# Patient Record
Sex: FEMALE | Race: WHITE | Employment: FULL TIME | URBAN - NONMETROPOLITAN AREA
[De-identification: names, ages, dates, MRNs, and addresses within clinical notes are randomized per-mention and may not be internally consistent; named-entity substitution may affect disease eponyms.]

---

## 2017-11-03 ENCOUNTER — HOSPITAL ENCOUNTER (EMERGENCY)
Age: 42
Discharge: HOME | End: 2017-11-03
Payer: COMMERCIAL

## 2017-11-03 VITALS
SYSTOLIC BLOOD PRESSURE: 118 MMHG | OXYGEN SATURATION: 98 % | RESPIRATION RATE: 16 BRPM | TEMPERATURE: 98.5 F | DIASTOLIC BLOOD PRESSURE: 85 MMHG | HEART RATE: 88 BPM

## 2017-11-03 DIAGNOSIS — L02.91 ABSCESS: Primary | ICD-10-CM

## 2017-11-03 PROCEDURE — 10060 I&D ABSCESS SIMPLE/SINGLE: CPT

## 2017-11-03 PROCEDURE — 86403 PARTICLE AGGLUT ANTBDY SCRN: CPT

## 2017-11-03 PROCEDURE — 87070 CULTURE OTHR SPECIMN AEROBIC: CPT

## 2017-11-03 PROCEDURE — 6370000000 HC RX 637 (ALT 250 FOR IP): Performed by: PHYSICIAN ASSISTANT

## 2017-11-03 PROCEDURE — 87205 SMEAR GRAM STAIN: CPT

## 2017-11-03 PROCEDURE — 99283 EMERGENCY DEPT VISIT LOW MDM: CPT

## 2017-11-03 RX ORDER — CEPHALEXIN 500 MG/1
500 CAPSULE ORAL 4 TIMES DAILY
Qty: 40 CAPSULE | Refills: 0 | Status: SHIPPED | OUTPATIENT
Start: 2017-11-03 | End: 2017-11-13

## 2017-11-03 RX ORDER — SULFAMETHOXAZOLE AND TRIMETHOPRIM 800; 160 MG/1; MG/1
1 TABLET ORAL 2 TIMES DAILY
Qty: 20 TABLET | Refills: 0 | Status: SHIPPED | OUTPATIENT
Start: 2017-11-03 | End: 2017-11-13

## 2017-11-03 RX ORDER — SULFAMETHOXAZOLE AND TRIMETHOPRIM 800; 160 MG/1; MG/1
1 TABLET ORAL ONCE
Status: COMPLETED | OUTPATIENT
Start: 2017-11-03 | End: 2017-11-03

## 2017-11-03 RX ORDER — HYDROCODONE BITARTRATE AND ACETAMINOPHEN 5; 325 MG/1; MG/1
1 TABLET ORAL EVERY 8 HOURS PRN
Qty: 12 TABLET | Refills: 0 | Status: SHIPPED | OUTPATIENT
Start: 2017-11-03 | End: 2017-11-10

## 2017-11-03 RX ORDER — CEPHALEXIN 500 MG/1
500 CAPSULE ORAL ONCE
Status: COMPLETED | OUTPATIENT
Start: 2017-11-03 | End: 2017-11-03

## 2017-11-03 RX ORDER — LIDOCAINE HYDROCHLORIDE 10 MG/ML
5 INJECTION, SOLUTION INFILTRATION; PERINEURAL ONCE
Status: DISCONTINUED | OUTPATIENT
Start: 2017-11-03 | End: 2017-11-03 | Stop reason: HOSPADM

## 2017-11-03 RX ADMIN — SULFAMETHOXAZOLE AND TRIMETHOPRIM 1 TABLET: 800; 160 TABLET ORAL at 19:28

## 2017-11-03 RX ADMIN — CEPHALEXIN 500 MG: 500 CAPSULE ORAL at 19:28

## 2017-11-03 ASSESSMENT — PAIN DESCRIPTION - LOCATION: LOCATION: BUTTOCKS

## 2017-11-03 ASSESSMENT — PAIN SCALES - GENERAL: PAINLEVEL_OUTOF10: 5

## 2017-11-03 ASSESSMENT — PAIN DESCRIPTION - DESCRIPTORS: DESCRIPTORS: DISCOMFORT;ACHING;SHOOTING

## 2017-11-03 ASSESSMENT — PAIN DESCRIPTION - PAIN TYPE: TYPE: ACUTE PAIN

## 2017-11-03 ASSESSMENT — PAIN DESCRIPTION - ORIENTATION: ORIENTATION: RIGHT

## 2017-11-03 ASSESSMENT — PAIN DESCRIPTION - FREQUENCY: FREQUENCY: CONTINUOUS

## 2017-11-04 ASSESSMENT — ENCOUNTER SYMPTOMS
EYE DISCHARGE: 0
EYE REDNESS: 0
CHEST TIGHTNESS: 0
SORE THROAT: 0
SHORTNESS OF BREATH: 0
BLOOD IN STOOL: 0
NAUSEA: 0
RHINORRHEA: 0
ABDOMINAL PAIN: 0
BACK PAIN: 0
COUGH: 0
CONSTIPATION: 0
DIARRHEA: 0
VOMITING: 0
WHEEZING: 0

## 2017-11-05 LAB
CULTURE: ABNORMAL
DIRECT EXAM: ABNORMAL
DIRECT EXAM: ABNORMAL
Lab: ABNORMAL
Lab: ABNORMAL
SPECIMEN DESCRIPTION: ABNORMAL
SPECIMEN DESCRIPTION: ABNORMAL
STATUS: ABNORMAL

## 2017-11-05 NOTE — ED PROVIDER NOTES
place, and time. She appears well-developed and well-nourished. No distress. HENT:   Head: Normocephalic and atraumatic. Right Ear: External ear normal.   Left Ear: External ear normal.   Eyes: Conjunctivae are normal. Right eye exhibits no discharge. Left eye exhibits no discharge. No scleral icterus. Neck: Normal range of motion. Neck supple. No tracheal deviation present. Cardiovascular: Normal rate, regular rhythm and intact distal pulses. Pulmonary/Chest: Effort normal. No stridor. No respiratory distress. Musculoskeletal: Normal range of motion. She exhibits no edema, tenderness or deformity. Neurological: She is alert and oriented to person, place, and time. Skin: Skin is warm and dry. No rash noted. She is not diaphoretic. There is erythema. Patient has a 3 x 3 cm area of fluctuance and some mild surrounding induration to the left gluteus. There it is erythematous and warm. Tender to touch. It does not extend into the rectum. There is no streaking down the leg or across the buttocks. Psychiatric: She has a normal mood and affect. Her behavior is normal.   Nursing note and vitals reviewed.       DIAGNOSTIC RESULTS     EKG: All EKG's are interpreted by the Emergency Department Physician who either signs or Co-signs this chart in the absence of a cardiologist.      RADIOLOGY:   Non-plain film images such as CT, Ultrasound and MRI are read by the radiologist. Plain radiographic images are visualized and preliminarily interpreted by the emergency physician with the below findings:      Interpretation per the Radiologist below, if available at the time of this note:    No orders to display         ED BEDSIDE ULTRASOUND:   Performed by ED Physician - none    LABS:  Labs Reviewed   WOUND CULTURE - Abnormal; Notable for the following:        Result Value    Direct Exam RARE NEUTROPHILS (*)     Direct Exam  [address] (*)     All other components within normal limits       All other labs were within normal range or not returned as of this dictation. EMERGENCY DEPARTMENT COURSE and DIFFERENTIAL DIAGNOSIS/MDM:   Vitals:    Vitals:    11/03/17 1832 11/03/17 1935   BP: 129/77 118/85   Pulse: 100 88   Resp: 18 16   Temp: 98.5 °F (36.9 °C)    TempSrc: Tympanic    SpO2: 98%          MDM  53-year-old female who presents with abscess to left buttocks onset for the past week. There is an area where she obviously already has tried opening it up. I discussed with her that we can make a small opening as this doesn't extend directly into the rectum that is about 2 inches close to it. She is afebrile she not tachycardic she does not wish to have any imaging of this or blood work. Patient resting completely this time and in no distress after I&D. Stress with her diagnosis plantar treatment and follow-up. I discussed specifically with her the need to have this rechecked within 48 hours to the [place]. She verbalized agreement is plan questions significantly. She will otherwise return immediately to the ER with any new or worsening complaints. She mentioned she is to develop a fever or worsening redness to return immediately to the ER. Incision/Drainage  Date/Time: 11/4/2017 11:51 PM  Performed by: Neal Spurling by: Rogelio Barros     Consent:     Consent obtained:  Verbal    Consent given by:  Patient    Risks discussed:  Bleeding, damage to other organs, infection, incomplete drainage and pain    Alternatives discussed:  No treatment  Location:     Type:  Abscess    Location: Left gluteus. Pre-procedure details:     Skin preparation:  Betadine  Anesthesia (see MAR for exact dosages):      Anesthesia method:  Local infiltration    Local anesthetic:  Lidocaine 1% w/o epi  Procedure type:     Complexity:  Simple  Procedure details:     Incision types:  Stab incision    Scalpel blade:  11    Wound management:  Probed and deloculated, irrigated with saline and extensive cleaning    Drainage:  Bloody and purulent    Drainage amount: Moderate    Wound treatment:  Wound left open    Packing materials:  1/2 in gauze  Post-procedure details:     Patient tolerance of procedure: Tolerated well, no immediate complications        FINAL IMPRESSION      1.  Abscess          DISPOSITION/PLAN   DISPOSITION Decision to Discharge    PATIENT REFERRED TO:  Sr [address] [facility], DO  [address]  [phone]    Schedule an appointment as soon as possible for a visit in 2 days  For wound re-check    [facility] ED  [address] [facility]  [phone]    If symptoms worsen, As needed    [facility], DO  [address]   [phone]    Schedule an appointment as soon as possible for a visit   Call monday to schedule general surgery follow up Monday      DISCHARGE MEDICATIONS:  Discharge Medication List as of 11/3/2017  7:21 PM      START taking these medications    Details   sulfamethoxazole-trimethoprim (BACTRIM DS) 800-160 MG per tablet Take 1 tablet by mouth 2 times daily for 10 days, Disp-20 tablet, R-0Print      cephALEXin (KEFLEX) 500 MG capsule Take 1 capsule by mouth 4 times daily for 10 days, Disp-40 capsule, R-0Print      HYDROcodone-acetaminophen (NORCO) 5-325 MG per tablet Take 1 tablet by mouth every 8 hours as needed for Pain ., Disp-12 tablet, R-0Print                    Summation      Patient Course:      ED Medications administered this visit:    Medications   cephALEXin (KEFLEX) capsule 500 mg (500 mg Oral Given 11/3/17 1928)   sulfamethoxazole-trimethoprim (BACTRIM DS;SEPTRA DS) 800-160 MG per tablet 1 tablet (1 tablet Oral Given 11/3/17 1928)       New Prescriptions from this visit:    Discharge Medication List as of 11/3/2017  7:21 PM      START taking these medications    Details   sulfamethoxazole-trimethoprim (BACTRIM DS) 800-160 MG per tablet Take 1 tablet by mouth 2 times daily for 10 days, Disp-20 tablet, R-0Print      cephALEXin (KEFLEX) 500 MG capsule Take 1 capsule by mouth 4 times daily for 10 days, Disp-40 capsule, R-0Print      HYDROcodone-acetaminophen (NORCO) 5-325 MG per tablet Take 1 tablet by mouth every 8 hours as needed for Pain ., Disp-12 tablet, R-0Print             Follow-up:  Liborio Baeza DO  [address] [facility]  [phone]    Schedule an appointment as soon as possible for a visit in 2 days  For wound re-check    [facility] ED  [address] [facility]  [phone]    If symptoms worsen, As needed    James Farah DO  [address] [place] [address]   [phone]    Schedule an appointment as soon as possible for a visit   Call monday to schedule general surgery follow up Monday        Final Impression:   1.  Abscess               (Please note that portions of this note were completed with a voice recognition program.  Efforts were made to edit the dictations but occasionally words are mis-transcribed.)            Shaneka Boudreaux PA-C  11/04/17 2799

## 2018-02-05 RX ORDER — OMEPRAZOLE 20 MG/1
20 CAPSULE, DELAYED RELEASE ORAL DAILY
COMMUNITY
End: 2018-08-24 | Stop reason: ALTCHOICE

## 2018-02-15 ENCOUNTER — HOSPITAL ENCOUNTER (OUTPATIENT)
Age: 43
Discharge: HOME | End: 2018-02-15
Payer: COMMERCIAL

## 2018-02-15 ENCOUNTER — TELEPHONE (OUTPATIENT)
Dept: PRIMARY CARE CLINIC | Age: 43
End: 2018-02-15

## 2018-02-15 ENCOUNTER — OFFICE VISIT (OUTPATIENT)
Dept: PRIMARY CARE CLINIC | Age: 43
End: 2018-02-15
Payer: COMMERCIAL

## 2018-02-15 VITALS
DIASTOLIC BLOOD PRESSURE: 88 MMHG | HEIGHT: 63 IN | WEIGHT: 222.9 LBS | BODY MASS INDEX: 39.5 KG/M2 | RESPIRATION RATE: 20 BRPM | SYSTOLIC BLOOD PRESSURE: 115 MMHG | TEMPERATURE: 97.6 F | HEART RATE: 108 BPM

## 2018-02-15 DIAGNOSIS — Z90.5 SINGLE KIDNEY: ICD-10-CM

## 2018-02-15 DIAGNOSIS — Z23 NEED FOR 23-POLYVALENT PNEUMOCOCCAL POLYSACCHARIDE VACCINE: ICD-10-CM

## 2018-02-15 DIAGNOSIS — Z72.0 TOBACCO ABUSE: ICD-10-CM

## 2018-02-15 DIAGNOSIS — Z12.39 BREAST CANCER SCREENING: ICD-10-CM

## 2018-02-15 DIAGNOSIS — Z13.220 LIPID SCREENING: ICD-10-CM

## 2018-02-15 DIAGNOSIS — E11.9 TYPE 2 DIABETES MELLITUS WITHOUT COMPLICATION, WITHOUT LONG-TERM CURRENT USE OF INSULIN (HCC): ICD-10-CM

## 2018-02-15 DIAGNOSIS — Z76.89 ESTABLISHING CARE WITH NEW DOCTOR, ENCOUNTER FOR: Primary | ICD-10-CM

## 2018-02-15 DIAGNOSIS — Z76.89 ESTABLISHING CARE WITH NEW DOCTOR, ENCOUNTER FOR: ICD-10-CM

## 2018-02-15 LAB
-: ABNORMAL
ABSOLUTE EOS #: 0.3 K/UL (ref 0–0.4)
ABSOLUTE IMMATURE GRANULOCYTE: ABNORMAL K/UL (ref 0–0.3)
ABSOLUTE LYMPH #: 3.4 K/UL (ref 1–4.8)
ABSOLUTE MONO #: 0.5 K/UL (ref 0–1)
ALBUMIN SERPL-MCNC: 3.8 G/DL (ref 3.5–5.2)
ALBUMIN/GLOBULIN RATIO: 1.3 (ref 1–2.5)
ALP BLD-CCNC: 74 U/L (ref 35–104)
ALT SERPL-CCNC: 29 U/L (ref 5–33)
AMORPHOUS: ABNORMAL
ANION GAP SERPL CALCULATED.3IONS-SCNC: 15 MMOL/L (ref 9–17)
AST SERPL-CCNC: 19 U/L
BACTERIA: ABNORMAL
BASOPHILS # BLD: 0 % (ref 0–2)
BASOPHILS ABSOLUTE: 0 K/UL (ref 0–0.2)
BILIRUB SERPL-MCNC: 0.29 MG/DL (ref 0.3–1.2)
BILIRUBIN URINE: NEGATIVE
BUN BLDV-MCNC: 23 MG/DL (ref 6–20)
BUN/CREAT BLD: 35 (ref 9–20)
CALCIUM SERPL-MCNC: 9.2 MG/DL (ref 8.6–10.4)
CASTS UA: ABNORMAL /LPF
CHLORIDE BLD-SCNC: 96 MMOL/L (ref 98–107)
CO2: 21 MMOL/L (ref 20–31)
COLOR: YELLOW
COMMENT UA: ABNORMAL
CREAT SERPL-MCNC: 0.65 MG/DL (ref 0.5–0.9)
CREATININE URINE: 55.7 MG/DL (ref 28–217)
CRYSTALS, UA: ABNORMAL /HPF
DIFFERENTIAL TYPE: ABNORMAL
EOSINOPHILS RELATIVE PERCENT: 2 % (ref 0–8)
EPITHELIAL CELLS UA: ABNORMAL /HPF (ref 0–25)
GFR AFRICAN AMERICAN: >60 ML/MIN
GFR NON-AFRICAN AMERICAN: >60 ML/MIN
GFR SERPL CREATININE-BSD FRML MDRD: ABNORMAL ML/MIN/{1.73_M2}
GFR SERPL CREATININE-BSD FRML MDRD: ABNORMAL ML/MIN/{1.73_M2}
GLUCOSE BLD-MCNC: 359 MG/DL (ref 70–99)
GLUCOSE URINE: ABNORMAL
HBA1C MFR BLD: 11.3 %
HCT VFR BLD CALC: 42.8 % (ref 36–46)
HEMOGLOBIN: 14.4 G/DL (ref 12–16)
IMMATURE GRANULOCYTES: ABNORMAL %
KETONES, URINE: ABNORMAL
LEUKOCYTE ESTERASE, URINE: NEGATIVE
LYMPHOCYTES # BLD: 28 % (ref 24–44)
MCH RBC QN AUTO: 31.9 PG (ref 26–34)
MCHC RBC AUTO-ENTMCNC: 33.7 G/DL (ref 31–37)
MCV RBC AUTO: 94.6 FL (ref 80–100)
MICROALBUMIN/CREAT 24H UR: <12 MG/L
MICROALBUMIN/CREAT UR-RTO: NORMAL MCG/MG CREAT
MONOCYTES # BLD: 4 % (ref 0–12)
MUCUS: ABNORMAL
NITRITE, URINE: NEGATIVE
NRBC AUTOMATED: ABNORMAL PER 100 WBC
OTHER OBSERVATIONS UA: ABNORMAL
PDW BLD-RTO: 12.9 % (ref 12.1–15.2)
PH UA: 7 (ref 5–9)
PLATELET # BLD: 241 K/UL (ref 140–450)
PLATELET ESTIMATE: ABNORMAL
PMV BLD AUTO: 9.2 FL (ref 6–12)
POTASSIUM SERPL-SCNC: 4.2 MMOL/L (ref 3.7–5.3)
PROTEIN UA: NEGATIVE
RBC # BLD: 4.52 M/UL (ref 4–5.2)
RBC # BLD: ABNORMAL 10*6/UL
RBC UA: ABNORMAL /HPF (ref 0–2)
RENAL EPITHELIAL, UA: ABNORMAL /HPF
SEG NEUTROPHILS: 66 % (ref 36–66)
SEGMENTED NEUTROPHILS ABSOLUTE COUNT: 7.7 K/UL (ref 1.8–7.7)
SODIUM BLD-SCNC: 132 MMOL/L (ref 135–144)
SPECIFIC GRAVITY UA: 1.01 (ref 1.01–1.02)
TOTAL PROTEIN: 6.7 G/DL (ref 6.4–8.3)
TRICHOMONAS: ABNORMAL
TSH SERPL DL<=0.05 MIU/L-ACNC: 2.33 MIU/L (ref 0.3–5)
TURBIDITY: CLEAR
URINE HGB: NEGATIVE
UROBILINOGEN, URINE: NORMAL
WBC # BLD: 11.9 K/UL (ref 3.5–11)
WBC # BLD: ABNORMAL 10*3/UL
WBC UA: ABNORMAL /HPF (ref 0–5)
YEAST: ABNORMAL

## 2018-02-15 PROCEDURE — 85025 COMPLETE CBC W/AUTO DIFF WBC: CPT

## 2018-02-15 PROCEDURE — 84443 ASSAY THYROID STIM HORMONE: CPT

## 2018-02-15 PROCEDURE — 83036 HEMOGLOBIN GLYCOSYLATED A1C: CPT | Performed by: NURSE PRACTITIONER

## 2018-02-15 PROCEDURE — 82043 UR ALBUMIN QUANTITATIVE: CPT

## 2018-02-15 PROCEDURE — 36415 COLL VENOUS BLD VENIPUNCTURE: CPT

## 2018-02-15 PROCEDURE — 80053 COMPREHEN METABOLIC PANEL: CPT

## 2018-02-15 PROCEDURE — 99203 OFFICE O/P NEW LOW 30 MIN: CPT | Performed by: NURSE PRACTITIONER

## 2018-02-15 PROCEDURE — 82570 ASSAY OF URINE CREATININE: CPT

## 2018-02-15 PROCEDURE — 90471 IMMUNIZATION ADMIN: CPT | Performed by: NURSE PRACTITIONER

## 2018-02-15 PROCEDURE — 81001 URINALYSIS AUTO W/SCOPE: CPT

## 2018-02-15 PROCEDURE — 90732 PPSV23 VACC 2 YRS+ SUBQ/IM: CPT | Performed by: NURSE PRACTITIONER

## 2018-02-15 RX ORDER — ATORVASTATIN CALCIUM 40 MG/1
40 TABLET, FILM COATED ORAL DAILY
Qty: 30 TABLET | Refills: 3 | Status: SHIPPED | OUTPATIENT
Start: 2018-02-15 | End: 2018-05-17 | Stop reason: SDUPTHER

## 2018-02-15 RX ORDER — LISINOPRIL 2.5 MG/1
2.5 TABLET ORAL DAILY
Qty: 30 TABLET | Refills: 3 | Status: SHIPPED | OUTPATIENT
Start: 2018-02-15 | End: 2018-05-17 | Stop reason: SDUPTHER

## 2018-02-15 RX ORDER — ASPIRIN 81 MG/1
81 TABLET ORAL DAILY
Qty: 30 TABLET | Refills: 3 | Status: SHIPPED | OUTPATIENT
Start: 2018-02-15 | End: 2018-05-17 | Stop reason: SDUPTHER

## 2018-02-15 ASSESSMENT — ENCOUNTER SYMPTOMS
TROUBLE SWALLOWING: 0
VISUAL CHANGE: 0
EYES NEGATIVE: 1
GASTROINTESTINAL NEGATIVE: 1
BLURRED VISION: 0
RESPIRATORY NEGATIVE: 1

## 2018-02-15 NOTE — TELEPHONE ENCOUNTER
Called patient and informed of lab results and medication sent to pharmacy and the need to repeat labs in 3 months. Verbalizes understanding.

## 2018-02-15 NOTE — PROGRESS NOTES
After obtaining consent, and per orders of Colt Espinoza CNP, injection of Pneumovax 23 given IM in Right deltoid by Destiney Zavala. Patient instructed to remain in clinic for 20 minutes afterwards, and to report any adverse reaction to me immediately.

## 2018-02-15 NOTE — PROGRESS NOTES
Name: Johnathon Lopez  : 1975         Chief Complaint:     Chief Complaint   Patient presents with   [facility] Establish Care     previous pt of Dr Placido Felipe in [facility], has not been see in over 2 years.  Diabetes       History of Present Illness:      Johnathon Lopez is a 38 y.o.  female who presents with Establish Care (previous pt of Dr Placido Felipe in [facility], has not been see in over 2 years.) and Diabetes    Have you seen any other physician or provider since your last visit new to practice    Have you had any other diagnostic tests since your last visit? no    Have you changed or stopped any medications since your last visit including any over-the-counter medicines, vitamins, or herbal medicines? New to practice     Are you taking all your prescribed medications? Yes  If NO, why? -  N/A           Patient Self-Management Goal for this visit. What is your goal for your visit today? -Establish care    Barriers to success: none   Plan for overcoming my barriers: N/A      Confidence: 8/10   Date goal set: 2/15/18   Date expected to reach goal: 3months      Health Maintenance Due   Topic Date Due    Diabetic foot exam  1987    Diabetic retinal exam  1987    HIV screen  1992    Diabetic microalbuminuria test  1995    DTaP/Tdap/Td vaccine (1 - Tdap) 1996    Pneumococcal med risk (1 of 1 - PPSV23) 1996    Cervical cancer screen  1998    A1C test (Diabetic or Prediabetic)  2015    Lipid screen  2015    Flu vaccine (1) 2017     Has not seen a doctor for at least three years. States stopped taking all her diabetic medications. \"my blood sugar got better so I stopped taking them'      Diabetes   She presents for her follow-up diabetic visit. She has type 2 diabetes mellitus. No MedicAlert identification noted. The initial diagnosis of diabetes was made 5 years ago. Her disease course has been worsening. There are no hypoglycemic associated symptoms. A1C test (Diabetic or Prediabetic)  08/04/2015    Lipid screen  08/04/2015    Flu vaccine (1) 09/01/2017       Past Surgical History:     Past Surgical History:   Procedure Laterality Date    BLADDER SUSPENSION      CARPAL TUNNEL RELEASE      CHOLECYSTECTOMY      DILATION AND CURETTAGE OF UTERUS  2007    KIDNEY REMOVAL      left        Medications:       Prior to Admission medications    Medication Sig Start Date End Date Taking? Authorizing Provider   omeprazole (PRILOSEC) 20 MG delayed release capsule Take 20 mg by mouth Daily   Yes Historical Provider, MD   ibuprofen (ADVIL;MOTRIN) 200 MG tablet Take 800 mg by mouth daily as needed for Pain     Historical Provider, MD        Allergies:       Pcn [penicillins]    Social History:     Tobacco:    reports that she has been smoking Cigarettes. She has a 25.00 pack-year smoking history. She has never used smokeless tobacco.  Alcohol:      reports that she drinks about 3.6 oz of alcohol per week . Drug Use:  reports that she does not use drugs. Family History:     Family History   Problem Relation Age of Onset    High Cholesterol Mother     Other Brother     Alcohol Abuse Maternal Grandmother     Cancer Maternal Grandmother     Depression Maternal Grandmother     Diabetes Maternal Grandmother     Heart Disease Maternal Grandmother     High Blood Pressure Maternal Grandmother     High Cholesterol Maternal Grandmother     Mental Illness Maternal Grandmother     Obesity Maternal Grandmother     Other Maternal Grandfather        Review of Systems:     Positive and Negative as described in HPI    Review of Systems   Constitutional: Negative. Negative for activity change, appetite change, fever, unexpected weight change and weight loss. HENT: Negative. Negative for trouble swallowing. Eyes: Negative. Negative for blurred vision and visual disturbance. Respiratory: Negative. Cardiovascular: Negative.   Negative for chest pain, palpitations and leg swelling. Gastrointestinal: Negative. Endocrine: Positive for polydipsia, polyphagia and polyuria. Genitourinary: Positive for frequency. Negative for dysuria and menstrual problem. Musculoskeletal: Negative. Skin: Negative. Neurological: Negative. Psychiatric/Behavioral: Negative. Physical Exam:   Vitals: There were no vitals taken for this visit. Physical Exam   Constitutional: She is oriented to person, place, and time. Vital signs are normal. She appears well-developed and well-nourished. She is cooperative. Non-toxic appearance. She does not appear ill. No distress. Appears well hydrated and non toxic. Sitting upright on exam table without distress. Respirations are regular, non labored and quiet. HENT:   Head: Normocephalic and atraumatic. Nose: Nose normal.   Mouth/Throat: Uvula is midline, oropharynx is clear and moist and mucous membranes are normal.   Eyes: Conjunctivae and EOM are normal. Pupils are equal, round, and reactive to light. Neck: Normal range of motion. Neck supple. No tracheal deviation present. No thyromegaly present. Cardiovascular: Normal rate, regular rhythm, normal heart sounds and intact distal pulses. Exam reveals no gallop and no friction rub. No murmur heard. Pulses:       Radial pulses are 2+ on the left side. No peripheral edema    Pulmonary/Chest: Effort normal and breath sounds normal. No accessory muscle usage. No tachypnea. No respiratory distress. She has no decreased breath sounds. She has no wheezes. She has no rhonchi. She has no rales. No cough, no wheeze, no distress  Breath sounds are clear to auscultation over posterior bilateral fields. Chest expansion is symmetrical with non-restricted air movement. Abdominal: Soft. Normal appearance and bowel sounds are normal. She exhibits no distension, no abdominal bruit, no pulsatile midline mass and no mass. There is no hepatosplenomegaly.  There is no Differential     Standing Status:   Future     Standing Expiration Date:   2/15/2019    Urinalysis     Standing Status:   Future     Standing Expiration Date:   2/15/2019     Order Specific Question:   SPECIFY(EX-CATH,MIDSTREAM,CYSTO,ETC)? Answer:   [facility] Diabetes Education Noemi     Referral Priority:   Routine     Referral Type:   Consult for Advice and Opinion     Referral Reason:   Specialty Services Required     Number of Visits Requested:   1    POCT glycosylated hemoglobin (Hb A1C)     No orders of the defined types were placed in this encounter. No diagnosis found. 1.  Funmi Prajapati received counseling on the following healthy behaviors: nutrition, exercise, medication adherence and tobacco cessation  2. Patient given educational materials - see patient instructions  3. Was a self-tracking handout given in paper form or via [employer]? No  If yes, see orders or list here. 4.  Discussed use, benefit, and side effects of prescribed medications. Barriers to medication compliance addressed. All patient questions answered. Pt voiced understanding. 5.  Reviewed prior labs and health maintenance  6. Continue current medications, diet and exercise. Completed Refills   Requested Prescriptions      No prescriptions requested or ordered in this encounter     . ptn    No Follow-up on file.

## 2018-02-16 ENCOUNTER — HOSPITAL ENCOUNTER (OUTPATIENT)
Dept: DIABETES SERVICES | Age: 43
Setting detail: THERAPIES SERIES
Discharge: HOME | End: 2018-02-16
Payer: COMMERCIAL

## 2018-02-16 PROCEDURE — G0108 DIAB MANAGE TRN  PER INDIV: HCPCS | Performed by: COUNSELOR

## 2018-02-16 ASSESSMENT — PATIENT HEALTH QUESTIONNAIRE - PHQ9
10. IF YOU CHECKED OFF ANY PROBLEMS, HOW DIFFICULT HAVE THESE PROBLEMS MADE IT FOR YOU TO DO YOUR WORK, TAKE CARE OF THINGS AT HOME, OR GET ALONG WITH OTHER PEOPLE: 1
SUM OF ALL RESPONSES TO PHQ9 QUESTIONS 1 & 2: 6
9. THOUGHTS THAT YOU WOULD BE BETTER OFF DEAD, OR OF HURTING YOURSELF: 0
5. POOR APPETITE OR OVEREATING: 2
8. MOVING OR SPEAKING SO SLOWLY THAT OTHER PEOPLE COULD HAVE NOTICED. OR THE OPPOSITE, BEING SO FIGETY OR RESTLESS THAT YOU HAVE BEEN MOVING AROUND A LOT MORE THAN USUAL: 0
4. FEELING TIRED OR HAVING LITTLE ENERGY: 3
6. FEELING BAD ABOUT YOURSELF - OR THAT YOU ARE A FAILURE OR HAVE LET YOURSELF OR YOUR FAMILY DOWN: 1
3. TROUBLE FALLING OR STAYING ASLEEP: 2
2. FEELING DOWN, DEPRESSED OR HOPELESS: 3
SUM OF ALL RESPONSES TO PHQ QUESTIONS 1-9: 14
1. LITTLE INTEREST OR PLEASURE IN DOING THINGS: 3
7. TROUBLE CONCENTRATING ON THINGS, SUCH AS READING THE NEWSPAPER OR WATCHING TELEVISION: 0

## 2018-02-16 NOTE — PROGRESS NOTES
Diabetes Self-Management Education Record    Progress Note: Patient reports feeling depressed for several months, because of her health. Patient reports that she is often down during winter months. Encouraged exercise . Reviewed carb counting and importance of meal planning.       Participant Name: Kieran Lakhani  Referring Provider:  Radhika Aguirre NP    Keys to learning:  Considerations: []Language []Emotional []Health Literacy  []Cognitive []Memory changes []Financial []Cultural   []Samaritan []Vision []Hearing  []Speech []Lack of desire  []Literacy  []Psycho-social  []None  If considerations are noted, accommodations made:     Identified barriers to learning/self management:     The following information was discussed:    [x] Diabetes disease process and treatment options   [x] Healthy nutrition, carbohydrate counting, meal planning  [x] Monitoring blood glucose and other parameters; interpreting and using results  [] Acute complications--prevention, detection and treatment  [] Medication management and safety Instructed by: [] Pharmacist [] nurse  [] Incorporating physical activity into lifestyle Instructed by:[] Exercise physiologist [] nurse  [] Exercise for Health, Reducing Risks for Heart Disease, Diabetes and Heart Health  [] Preventing, through risk reduction behaviors, detecting, and treating chronic complications  [] Sick Day management  [] Developing personalized strategies to address psychosocial issues and concerns  [] Developing personalized strategies to promote health and behavior change through goalsetting, behavior change strategies aimed at risk reduction  [] Special situations--disaster planning, travel, social activities    Session Assessment & Evaluation Ratings:  1=Needs Instruction  2=Needs Review  3=Comprehends Key Points  4=Demonstrates Understanding/Competency  NC=Not Covered   N/A=Not Applicable Initial  Assess    Date:  2-16-18 2nd  Visit     Date:   3rd  Visit    Date: Correctly read food labels & demonstrate CHO counting & portion control with personalized meal plan. Rating  [] 1  [] 2  [x] 3  [] 4  [] NC  [] N/A     Rating  [] 1  [] 2  [] 3  [] 4  [] NC  [] N/A     Rating  [] 1  [] 2  [] 3  [] 4  [] NC  [] N/A     Rating  [] 1  [] 2  [] 3  [] 4  [] NC  [] N/A                 Incorporating physical activity into lifestyle:   State effect of exercise on blood glucose levels. Identifies personal exercise plan. Discussed safety tips while exercising. Rating  [] 1  [] 2  [x] 3  [] 4  [] NC  [] N/A     Rating  [] 1  [] 2  [] 3  [] 4  [] NC  [] N/A       Rating  [] 1  [] 2  [] 3  [] 4  [] NC  [] N/A     Rating  [] 1  [] 2  [] 3  [] 4  [] NC  [] N/A           Using medications safely: State effect of diabetes medicines on diabetes;   Name diabetes medication taking, action, timing & side effects     Rating  [] 1  [] 2  [x] 3  [] 4  [] NC  [] N/A       Rating  [] 1  [] 2  [] 3  [] 4  [] NC  [] N/A       Rating  [] 1  [] 2  [] 3  [] 4  [] NC  [] N/A       Rating  [] 1  [] 2  [] 3  [] 4  [] NC  [] N/A           Monitoring blood glucose, interpreting and using results: Identify recommended blood glucose targets, personal targets, appropriate techniques and problem solving. Rating  [] 1  [] 2  [x] 3  [] 4  [] NC  [] N/A     Rating  [] 1  [] 2  [] 3  [] 4  [] NC  [] N/A       Rating  [] 1  [] 2  [] 3  [] 4  [] NC  [] N/A     Rating  [] 1  [] 2  [] 3  [] 4  [] NC  [] N/A         Prevention, detection & treatment of acute complications: List symptoms of hyper- and hypoglycemia;    Describe how to treat low blood sugar & actions for lowering high blood glucose levels  Rating  [] 1  [] 2  [x] 3  [] 4  [] NC  [] N/A     Rating  [] 1  [] 2  [] 3  [] 4  [] NC  [] N/A     Rating  [] 1  [] 2  [] 3  [] 4  [] NC  [] N/A     Rating  [] 1  [] 2  [] 3  [] 4  [] NC  [] N/A         Prevention, detection & treatment of chronic complications: Define the natural course of diabetes & describe

## 2018-02-17 ENCOUNTER — HOSPITAL ENCOUNTER (OUTPATIENT)
Age: 43
Discharge: HOME | End: 2018-02-17
Payer: COMMERCIAL

## 2018-02-17 DIAGNOSIS — Z13.220 LIPID SCREENING: ICD-10-CM

## 2018-02-17 LAB
CHOLESTEROL/HDL RATIO: 9.5
CHOLESTEROL: 208 MG/DL
HDLC SERPL-MCNC: 22 MG/DL
LDL CHOLESTEROL DIRECT: 75 MG/DL
LDL CHOLESTEROL: ABNORMAL MG/DL (ref 0–130)
TRIGL SERPL-MCNC: 811 MG/DL
VLDLC SERPL CALC-MCNC: ABNORMAL MG/DL (ref 1–30)

## 2018-02-17 PROCEDURE — 36415 COLL VENOUS BLD VENIPUNCTURE: CPT

## 2018-02-17 PROCEDURE — 83721 ASSAY OF BLOOD LIPOPROTEIN: CPT

## 2018-02-17 PROCEDURE — 80061 LIPID PANEL: CPT

## 2018-02-19 ENCOUNTER — TELEPHONE (OUTPATIENT)
Dept: PRIMARY CARE CLINIC | Age: 43
End: 2018-02-19

## 2018-02-19 DIAGNOSIS — E11.9 TYPE 2 DIABETES MELLITUS WITHOUT COMPLICATION, WITHOUT LONG-TERM CURRENT USE OF INSULIN (HCC): Primary | ICD-10-CM

## 2018-02-19 DIAGNOSIS — E78.5 HYPERLIPIDEMIA, UNSPECIFIED HYPERLIPIDEMIA TYPE: ICD-10-CM

## 2018-03-08 ENCOUNTER — HOSPITAL ENCOUNTER (OUTPATIENT)
Dept: WOMENS IMAGING | Age: 43
Discharge: HOME | End: 2018-03-10
Payer: COMMERCIAL

## 2018-03-08 DIAGNOSIS — Z12.39 BREAST CANCER SCREENING: ICD-10-CM

## 2018-03-08 PROCEDURE — 77067 SCR MAMMO BI INCL CAD: CPT

## 2018-03-09 ENCOUNTER — TELEPHONE (OUTPATIENT)
Dept: PRIMARY CARE CLINIC | Age: 43
End: 2018-03-09

## 2018-03-09 NOTE — TELEPHONE ENCOUNTER
----- Message from Davonte Smith NP sent at 3/8/2018  5:55 PM EST -----  No mammographic evidence of malignancy. (CATEGORY 1 - ACR BI-RADS: NEGATIVE)   Please relate to patient/parent. Thank you.    Neno Alicea

## 2018-05-14 ENCOUNTER — HOSPITAL ENCOUNTER (OUTPATIENT)
Dept: GENERAL RADIOLOGY | Age: 43
Discharge: HOME | End: 2018-05-16
Payer: COMMERCIAL

## 2018-05-14 ENCOUNTER — HOSPITAL ENCOUNTER (OUTPATIENT)
Age: 43
Discharge: HOME | End: 2018-05-14
Payer: COMMERCIAL

## 2018-05-14 ENCOUNTER — OFFICE VISIT (OUTPATIENT)
Dept: PRIMARY CARE CLINIC | Age: 43
End: 2018-05-14
Payer: COMMERCIAL

## 2018-05-14 ENCOUNTER — HOSPITAL ENCOUNTER (OUTPATIENT)
Age: 43
Discharge: HOME | End: 2018-05-16
Payer: COMMERCIAL

## 2018-05-14 ENCOUNTER — TELEPHONE (OUTPATIENT)
Dept: PRIMARY CARE CLINIC | Age: 43
End: 2018-05-14

## 2018-05-14 VITALS
TEMPERATURE: 98.2 F | WEIGHT: 216.9 LBS | RESPIRATION RATE: 20 BRPM | DIASTOLIC BLOOD PRESSURE: 82 MMHG | BODY MASS INDEX: 38.42 KG/M2 | HEART RATE: 101 BPM | SYSTOLIC BLOOD PRESSURE: 128 MMHG

## 2018-05-14 DIAGNOSIS — M25.511 RIGHT SHOULDER PAIN, UNSPECIFIED CHRONICITY: ICD-10-CM

## 2018-05-14 DIAGNOSIS — R55 SYNCOPE, UNSPECIFIED SYNCOPE TYPE: ICD-10-CM

## 2018-05-14 DIAGNOSIS — M89.8X1 PAIN OF RIGHT CLAVICLE: ICD-10-CM

## 2018-05-14 DIAGNOSIS — B37.31 VAGINA, CANDIDIASIS: ICD-10-CM

## 2018-05-14 DIAGNOSIS — E78.5 HYPERLIPIDEMIA, UNSPECIFIED HYPERLIPIDEMIA TYPE: ICD-10-CM

## 2018-05-14 DIAGNOSIS — Z72.0 TOBACCO ABUSE: ICD-10-CM

## 2018-05-14 DIAGNOSIS — E11.9 TYPE 2 DIABETES MELLITUS WITHOUT COMPLICATION, WITHOUT LONG-TERM CURRENT USE OF INSULIN (HCC): Primary | ICD-10-CM

## 2018-05-14 DIAGNOSIS — R94.31 EKG, ABNORMAL: Primary | ICD-10-CM

## 2018-05-14 LAB
EKG ATRIAL RATE: 94 BPM
EKG P AXIS: 53 DEGREES
EKG P-R INTERVAL: 144 MS
EKG Q-T INTERVAL: 360 MS
EKG QRS DURATION: 86 MS
EKG QTC CALCULATION (BAZETT): 450 MS
EKG R AXIS: 48 DEGREES
EKG T AXIS: 28 DEGREES
EKG VENTRICULAR RATE: 94 BPM
HBA1C MFR BLD: 10.1 %

## 2018-05-14 PROCEDURE — 73030 X-RAY EXAM OF SHOULDER: CPT

## 2018-05-14 PROCEDURE — 93005 ELECTROCARDIOGRAM TRACING: CPT

## 2018-05-14 PROCEDURE — 83036 HEMOGLOBIN GLYCOSYLATED A1C: CPT | Performed by: NURSE PRACTITIONER

## 2018-05-14 PROCEDURE — 99213 OFFICE O/P EST LOW 20 MIN: CPT | Performed by: NURSE PRACTITIONER

## 2018-05-14 PROCEDURE — 73000 X-RAY EXAM OF COLLAR BONE: CPT

## 2018-05-14 RX ORDER — FLUCONAZOLE 150 MG/1
150 TABLET ORAL ONCE
Qty: 1 TABLET | Refills: 0 | Status: SHIPPED | OUTPATIENT
Start: 2018-05-14 | End: 2018-05-14

## 2018-05-14 ASSESSMENT — ENCOUNTER SYMPTOMS
GASTROINTESTINAL NEGATIVE: 1
SHORTNESS OF BREATH: 0
RESPIRATORY NEGATIVE: 1
COLOR CHANGE: 0
TROUBLE SWALLOWING: 0
WHEEZING: 0
EYES NEGATIVE: 1

## 2018-05-15 ENCOUNTER — TELEPHONE (OUTPATIENT)
Dept: PRIMARY CARE CLINIC | Age: 43
End: 2018-05-15

## 2018-05-15 DIAGNOSIS — M25.511 ACUTE PAIN OF RIGHT SHOULDER: Primary | ICD-10-CM

## 2018-05-16 ENCOUNTER — TELEPHONE (OUTPATIENT)
Dept: PRIMARY CARE CLINIC | Age: 43
End: 2018-05-16

## 2018-05-16 ENCOUNTER — HOSPITAL ENCOUNTER (OUTPATIENT)
Age: 43
Discharge: HOME | End: 2018-05-16
Payer: COMMERCIAL

## 2018-05-16 DIAGNOSIS — R82.90 ABNORMAL URINE: Primary | ICD-10-CM

## 2018-05-16 DIAGNOSIS — E78.5 HYPERLIPIDEMIA, UNSPECIFIED HYPERLIPIDEMIA TYPE: ICD-10-CM

## 2018-05-16 DIAGNOSIS — E11.9 TYPE 2 DIABETES MELLITUS WITHOUT COMPLICATION, WITHOUT LONG-TERM CURRENT USE OF INSULIN (HCC): ICD-10-CM

## 2018-05-16 LAB
-: ABNORMAL
ABSOLUTE EOS #: 0.26 K/UL (ref 0–0.44)
ABSOLUTE IMMATURE GRANULOCYTE: 0.07 K/UL (ref 0–0.3)
ABSOLUTE LYMPH #: 3.74 K/UL (ref 1.1–3.7)
ABSOLUTE MONO #: 0.55 K/UL (ref 0.1–1.2)
ALBUMIN SERPL-MCNC: 4 G/DL (ref 3.5–5.2)
ALBUMIN/GLOBULIN RATIO: 1.3 (ref 1–2.5)
ALP BLD-CCNC: 80 U/L (ref 35–104)
ALT SERPL-CCNC: 22 U/L (ref 5–33)
AMORPHOUS: ABNORMAL
ANION GAP SERPL CALCULATED.3IONS-SCNC: 16 MMOL/L (ref 9–17)
AST SERPL-CCNC: 14 U/L
BACTERIA: ABNORMAL
BASOPHILS # BLD: 0 % (ref 0–2)
BASOPHILS ABSOLUTE: 0.05 K/UL (ref 0–0.2)
BILIRUB SERPL-MCNC: 0.33 MG/DL (ref 0.3–1.2)
BILIRUBIN URINE: NEGATIVE
BUN BLDV-MCNC: 12 MG/DL (ref 6–20)
BUN/CREAT BLD: 24 (ref 9–20)
CALCIUM SERPL-MCNC: 9.8 MG/DL (ref 8.6–10.4)
CASTS UA: ABNORMAL /LPF
CHLORIDE BLD-SCNC: 97 MMOL/L (ref 98–107)
CHOLESTEROL/HDL RATIO: 7.2
CHOLESTEROL: 165 MG/DL
CO2: 22 MMOL/L (ref 20–31)
COLOR: YELLOW
COMMENT UA: ABNORMAL
CREAT SERPL-MCNC: 0.49 MG/DL (ref 0.5–0.9)
CRYSTALS, UA: ABNORMAL /HPF
DIFFERENTIAL TYPE: ABNORMAL
EOSINOPHILS RELATIVE PERCENT: 2 % (ref 1–4)
EPITHELIAL CELLS UA: ABNORMAL /HPF (ref 0–25)
ESTIMATED AVERAGE GLUCOSE: 243 MG/DL
GFR AFRICAN AMERICAN: >60 ML/MIN
GFR NON-AFRICAN AMERICAN: >60 ML/MIN
GFR SERPL CREATININE-BSD FRML MDRD: ABNORMAL ML/MIN/{1.73_M2}
GFR SERPL CREATININE-BSD FRML MDRD: ABNORMAL ML/MIN/{1.73_M2}
GLUCOSE BLD-MCNC: 236 MG/DL (ref 70–99)
GLUCOSE URINE: ABNORMAL
HBA1C MFR BLD: 10.1 % (ref 4.8–5.9)
HCT VFR BLD CALC: 45.3 % (ref 36.3–47.1)
HDLC SERPL-MCNC: 23 MG/DL
HEMOGLOBIN: 15.3 G/DL (ref 11.9–15.1)
IMMATURE GRANULOCYTES: 1 %
KETONES, URINE: ABNORMAL
LDL CHOLESTEROL DIRECT: 81 MG/DL
LDL CHOLESTEROL: ABNORMAL MG/DL (ref 0–130)
LEUKOCYTE ESTERASE, URINE: NEGATIVE
LYMPHOCYTES # BLD: 30 % (ref 24–43)
MCH RBC QN AUTO: 31.9 PG (ref 25.2–33.5)
MCHC RBC AUTO-ENTMCNC: 33.8 G/DL (ref 28.4–34.8)
MCV RBC AUTO: 94.6 FL (ref 82.6–102.9)
MONOCYTES # BLD: 5 % (ref 3–12)
MUCUS: ABNORMAL
NITRITE, URINE: NEGATIVE
NRBC AUTOMATED: 0 PER 100 WBC
OTHER OBSERVATIONS UA: ABNORMAL
PDW BLD-RTO: 12.3 % (ref 11.8–14.4)
PH UA: 6 (ref 5–9)
PLATELET # BLD: 265 K/UL (ref 138–453)
PLATELET ESTIMATE: ABNORMAL
PMV BLD AUTO: 10.3 FL (ref 8.1–13.5)
POTASSIUM SERPL-SCNC: 4.3 MMOL/L (ref 3.7–5.3)
PROTEIN UA: NEGATIVE
RBC # BLD: 4.79 M/UL (ref 3.95–5.11)
RBC # BLD: ABNORMAL 10*6/UL
RBC UA: ABNORMAL /HPF (ref 0–2)
RENAL EPITHELIAL, UA: ABNORMAL /HPF
SEG NEUTROPHILS: 62 % (ref 36–65)
SEGMENTED NEUTROPHILS ABSOLUTE COUNT: 7.65 K/UL (ref 1.5–8.1)
SODIUM BLD-SCNC: 135 MMOL/L (ref 135–144)
SPECIFIC GRAVITY UA: 1.02 (ref 1.01–1.02)
TOTAL PROTEIN: 7 G/DL (ref 6.4–8.3)
TRICHOMONAS: ABNORMAL
TRIGL SERPL-MCNC: 568 MG/DL
TURBIDITY: ABNORMAL
URINE HGB: NEGATIVE
UROBILINOGEN, URINE: NORMAL
VLDLC SERPL CALC-MCNC: ABNORMAL MG/DL (ref 1–30)
WBC # BLD: 12.3 K/UL (ref 3.5–11.3)
WBC # BLD: ABNORMAL 10*3/UL
WBC UA: ABNORMAL /HPF (ref 0–5)
YEAST: ABNORMAL

## 2018-05-16 PROCEDURE — 80061 LIPID PANEL: CPT

## 2018-05-16 PROCEDURE — 36415 COLL VENOUS BLD VENIPUNCTURE: CPT

## 2018-05-16 PROCEDURE — 83036 HEMOGLOBIN GLYCOSYLATED A1C: CPT

## 2018-05-16 PROCEDURE — 83721 ASSAY OF BLOOD LIPOPROTEIN: CPT

## 2018-05-16 PROCEDURE — 80053 COMPREHEN METABOLIC PANEL: CPT

## 2018-05-16 PROCEDURE — 85025 COMPLETE CBC W/AUTO DIFF WBC: CPT

## 2018-05-16 PROCEDURE — 81001 URINALYSIS AUTO W/SCOPE: CPT

## 2018-05-17 ENCOUNTER — OFFICE VISIT (OUTPATIENT)
Dept: PRIMARY CARE CLINIC | Age: 43
End: 2018-05-17
Payer: COMMERCIAL

## 2018-05-17 ENCOUNTER — TELEPHONE (OUTPATIENT)
Dept: PRIMARY CARE CLINIC | Age: 43
End: 2018-05-17

## 2018-05-17 ENCOUNTER — HOSPITAL ENCOUNTER (OUTPATIENT)
Age: 43
Discharge: HOME | End: 2018-05-17
Payer: COMMERCIAL

## 2018-05-17 VITALS
TEMPERATURE: 97.9 F | HEART RATE: 85 BPM | RESPIRATION RATE: 20 BRPM | DIASTOLIC BLOOD PRESSURE: 88 MMHG | BODY MASS INDEX: 38.6 KG/M2 | WEIGHT: 217.9 LBS | SYSTOLIC BLOOD PRESSURE: 115 MMHG

## 2018-05-17 DIAGNOSIS — R82.90 ABNORMAL URINE: ICD-10-CM

## 2018-05-17 DIAGNOSIS — Z72.0 TOBACCO ABUSE: ICD-10-CM

## 2018-05-17 DIAGNOSIS — Z90.5 SINGLE KIDNEY: ICD-10-CM

## 2018-05-17 DIAGNOSIS — M25.511 ACUTE PAIN OF RIGHT SHOULDER: ICD-10-CM

## 2018-05-17 DIAGNOSIS — E78.5 HYPERLIPIDEMIA, UNSPECIFIED HYPERLIPIDEMIA TYPE: ICD-10-CM

## 2018-05-17 DIAGNOSIS — M89.8X1 PAIN OF RIGHT CLAVICLE: ICD-10-CM

## 2018-05-17 DIAGNOSIS — E11.9 TYPE 2 DIABETES MELLITUS WITHOUT COMPLICATION, WITHOUT LONG-TERM CURRENT USE OF INSULIN (HCC): Primary | ICD-10-CM

## 2018-05-17 PROBLEM — E28.2 PCOS (POLYCYSTIC OVARIAN SYNDROME): Status: ACTIVE | Noted: 2018-05-17

## 2018-05-17 LAB
-: NORMAL
AMORPHOUS: NORMAL
BACTERIA: NORMAL
BILIRUBIN URINE: NEGATIVE
CASTS UA: NORMAL /LPF
COLOR: YELLOW
COMMENT UA: ABNORMAL
CRYSTALS, UA: NORMAL /HPF
EPITHELIAL CELLS UA: NORMAL /HPF (ref 0–25)
GLUCOSE URINE: ABNORMAL
KETONES, URINE: NEGATIVE
LEUKOCYTE ESTERASE, URINE: NEGATIVE
MUCUS: NORMAL
NITRITE, URINE: NEGATIVE
OTHER OBSERVATIONS UA: NORMAL
PH UA: 7 (ref 5–9)
PROTEIN UA: NEGATIVE
RBC UA: NORMAL /HPF (ref 0–2)
RENAL EPITHELIAL, UA: NORMAL /HPF
SPECIFIC GRAVITY UA: 1.01 (ref 1.01–1.02)
TRICHOMONAS: NORMAL
TURBIDITY: CLEAR
URINE HGB: NEGATIVE
UROBILINOGEN, URINE: NORMAL
WBC UA: NORMAL /HPF (ref 0–5)
YEAST: NORMAL

## 2018-05-17 PROCEDURE — 99213 OFFICE O/P EST LOW 20 MIN: CPT | Performed by: NURSE PRACTITIONER

## 2018-05-17 PROCEDURE — 81001 URINALYSIS AUTO W/SCOPE: CPT

## 2018-05-17 RX ORDER — ASPIRIN 81 MG/1
81 TABLET ORAL DAILY
Qty: 30 TABLET | Refills: 3 | Status: SHIPPED | OUTPATIENT
Start: 2018-05-17 | End: 2018-08-22 | Stop reason: SDUPTHER

## 2018-05-17 RX ORDER — ATORVASTATIN CALCIUM 40 MG/1
40 TABLET, FILM COATED ORAL DAILY
Qty: 30 TABLET | Refills: 3 | Status: SHIPPED | OUTPATIENT
Start: 2018-05-17 | End: 2018-08-22 | Stop reason: SDUPTHER

## 2018-05-17 RX ORDER — LISINOPRIL 2.5 MG/1
2.5 TABLET ORAL DAILY
Qty: 30 TABLET | Refills: 3 | Status: SHIPPED | OUTPATIENT
Start: 2018-05-17 | End: 2018-08-22 | Stop reason: SDUPTHER

## 2018-05-17 ASSESSMENT — ENCOUNTER SYMPTOMS
TROUBLE SWALLOWING: 0
EYES NEGATIVE: 1
RESPIRATORY NEGATIVE: 1
BLURRED VISION: 0
GASTROINTESTINAL NEGATIVE: 1

## 2018-05-24 ENCOUNTER — TELEPHONE (OUTPATIENT)
Dept: PRIMARY CARE CLINIC | Age: 43
End: 2018-05-24

## 2018-05-24 ENCOUNTER — HOSPITAL ENCOUNTER (OUTPATIENT)
Dept: NON INVASIVE DIAGNOSTICS | Age: 43
Discharge: HOME | End: 2018-05-24
Payer: COMMERCIAL

## 2018-05-24 PROCEDURE — 93660 TILT TABLE EVALUATION: CPT

## 2018-05-24 PROCEDURE — 6370000000 HC RX 637 (ALT 250 FOR IP): Performed by: FAMILY MEDICINE

## 2018-05-24 RX ORDER — NITROGLYCERIN 0.3 MG/1
0.3 TABLET SUBLINGUAL EVERY 5 MIN PRN
Status: DISCONTINUED | OUTPATIENT
Start: 2018-05-24 | End: 2018-05-25 | Stop reason: HOSPADM

## 2018-05-24 RX ADMIN — NITROGLYCERIN 0.3 MG: 0.3 TABLET SUBLINGUAL at 11:41

## 2018-05-31 ENCOUNTER — OFFICE VISIT (OUTPATIENT)
Dept: CARDIOLOGY | Age: 43
End: 2018-05-31
Payer: COMMERCIAL

## 2018-05-31 VITALS
HEIGHT: 62 IN | BODY MASS INDEX: 40.3 KG/M2 | WEIGHT: 219 LBS | HEART RATE: 97 BPM | DIASTOLIC BLOOD PRESSURE: 85 MMHG | RESPIRATION RATE: 16 BRPM | OXYGEN SATURATION: 96 % | SYSTOLIC BLOOD PRESSURE: 120 MMHG

## 2018-05-31 DIAGNOSIS — Z90.5 SINGLE KIDNEY: ICD-10-CM

## 2018-05-31 DIAGNOSIS — Z72.0 TOBACCO ABUSE: ICD-10-CM

## 2018-05-31 DIAGNOSIS — R55 SYNCOPE, UNSPECIFIED SYNCOPE TYPE: Primary | ICD-10-CM

## 2018-05-31 DIAGNOSIS — R06.02 SOB (SHORTNESS OF BREATH): ICD-10-CM

## 2018-05-31 DIAGNOSIS — R07.89 CHEST DISCOMFORT: ICD-10-CM

## 2018-05-31 PROCEDURE — 99244 OFF/OP CNSLTJ NEW/EST MOD 40: CPT | Performed by: INTERNAL MEDICINE

## 2018-06-06 ENCOUNTER — HOSPITAL ENCOUNTER (OUTPATIENT)
Dept: ULTRASOUND IMAGING | Age: 43
Discharge: HOME | End: 2018-06-08
Payer: COMMERCIAL

## 2018-06-06 DIAGNOSIS — Z90.5 SINGLE KIDNEY: ICD-10-CM

## 2018-06-06 PROCEDURE — 76770 US EXAM ABDO BACK WALL COMP: CPT

## 2018-06-11 ENCOUNTER — TELEPHONE (OUTPATIENT)
Dept: CARDIOLOGY | Age: 43
End: 2018-06-11

## 2018-07-02 ENCOUNTER — HOSPITAL ENCOUNTER (OUTPATIENT)
Dept: NON INVASIVE DIAGNOSTICS | Age: 43
Discharge: HOME | End: 2018-07-02
Payer: COMMERCIAL

## 2018-07-02 DIAGNOSIS — R07.89 CHEST DISCOMFORT: ICD-10-CM

## 2018-07-02 DIAGNOSIS — R06.02 SOB (SHORTNESS OF BREATH): ICD-10-CM

## 2018-07-02 DIAGNOSIS — R55 SYNCOPE, UNSPECIFIED SYNCOPE TYPE: ICD-10-CM

## 2018-07-02 DIAGNOSIS — Z72.0 TOBACCO ABUSE: ICD-10-CM

## 2018-07-02 LAB
LV EF: 70 %
LVEF MODALITY: NORMAL

## 2018-07-02 PROCEDURE — A9500 TC99M SESTAMIBI: HCPCS | Performed by: INTERNAL MEDICINE

## 2018-07-02 PROCEDURE — 3430000000 HC RX DIAGNOSTIC RADIOPHARMACEUTICAL: Performed by: INTERNAL MEDICINE

## 2018-07-02 PROCEDURE — 78452 HT MUSCLE IMAGE SPECT MULT: CPT

## 2018-07-02 PROCEDURE — 93017 CV STRESS TEST TRACING ONLY: CPT

## 2018-07-02 PROCEDURE — 93306 TTE W/DOPPLER COMPLETE: CPT

## 2018-07-02 RX ADMIN — Medication 30.3 MILLICURIE: at 10:11

## 2018-07-03 ENCOUNTER — HOSPITAL ENCOUNTER (OUTPATIENT)
Dept: NON INVASIVE DIAGNOSTICS | Age: 43
Discharge: HOME | End: 2018-07-03
Payer: COMMERCIAL

## 2018-07-03 PROCEDURE — 3430000000 HC RX DIAGNOSTIC RADIOPHARMACEUTICAL: Performed by: INTERNAL MEDICINE

## 2018-07-03 PROCEDURE — A9500 TC99M SESTAMIBI: HCPCS | Performed by: INTERNAL MEDICINE

## 2018-07-03 RX ADMIN — Medication 30 MILLICURIE: at 12:35

## 2018-07-05 ENCOUNTER — TELEPHONE (OUTPATIENT)
Dept: CARDIOLOGY | Age: 43
End: 2018-07-05

## 2018-07-05 NOTE — PROCEDURES
exercise, the  patient was injected with 30.0 mCi of sestamibi and exercise was continued  for 1 minute. Gating post-stress tomographic imaging was performed 30-45  minutes after stress. STRESS ECG RESULTS:  The resting electrocardiogram demonstrated normal  sinus rhythm without definitive ST-segment abnormalities suggestive of  myocardial ischemia. At peak exercise and during recovery, the patient developed:    No significant ST segment changes suggestive of myocardial ischemia with no  premature atrial contractions (PACs) and no premature ventricular  contractions (PVCs). NUCLEAR IMAGING RESULTS:  The overall quality of the study is fair. No  significant attenuation artifact was seen. There is no evidence of abnormal  lung uptake. Additionally, the right ventricle appears normal.  The left  ventricular cavity is noted to be normal in size on the stress images. There is no evidence of transient ischemic dilatation (TID) of the left  ventricle. Gated SPECT imaging reveals normal myocardial thickening and wall motion  with a calculated left ventricular ejection fraction of 75%. The rest images demonstrated a small perfusion abnormality of mild  intensity in the anterior region(s) which is most likely due to artifact. On stress imaging, a small perfusion abnormality of mild intensity was  noted in the anterior region(s) which is most likely due to artifact. IMPRESSION:  1. Largely normal myocardial perfusion imaging with soft tissue artifact  but without evidence of significant myocardial ischemia or infarction. 2.  Global left ventricular systolic function was normal with an EF of 75%  without regional wall motion abnormalities. 3.  No significant electrocardiographic evidence of myocardial ischemia  during EKG monitoring without significant associated arrhythmias.     The patient's Duke Treadmill score is 6, which correlates with a low risk  for significant coronary artery disease. Overall, these results are most consistent with a low risk scan. Although the patient's results were not completely normal, unless clinical  suspicion for significant ongoing coronary artery ischemia is high, I would  not suggest pursuing additional testing by coronary angiography. RICHARD SHIPLEY    D: 07/05/2018 11:05:01       T: 07/05/2018 11:06:49     NELSON_SAMIR  Job#: [id number]     Doc#: Unknown    CC:  Tacho Bowers

## 2018-07-05 NOTE — TELEPHONE ENCOUNTER
----- Message from Kirsten Kee MD sent at 7/5/2018 11:22 AM EDT -----  Test result normal.  No further action needed.

## 2018-07-09 ENCOUNTER — OFFICE VISIT (OUTPATIENT)
Dept: CARDIOLOGY | Age: 43
End: 2018-07-09
Payer: COMMERCIAL

## 2018-07-09 VITALS
HEIGHT: 63 IN | DIASTOLIC BLOOD PRESSURE: 82 MMHG | BODY MASS INDEX: 38.27 KG/M2 | HEART RATE: 97 BPM | WEIGHT: 216 LBS | RESPIRATION RATE: 18 BRPM | OXYGEN SATURATION: 96 % | SYSTOLIC BLOOD PRESSURE: 115 MMHG

## 2018-07-09 DIAGNOSIS — Z72.0 TOBACCO ABUSE: ICD-10-CM

## 2018-07-09 DIAGNOSIS — E66.9 OBESITY, CLASS II, BMI 35-39.9: ICD-10-CM

## 2018-07-09 DIAGNOSIS — R55 SYNCOPE, UNSPECIFIED SYNCOPE TYPE: ICD-10-CM

## 2018-07-09 DIAGNOSIS — E78.2 MIXED HYPERLIPIDEMIA: ICD-10-CM

## 2018-07-09 DIAGNOSIS — R07.89 CHEST DISCOMFORT: Primary | ICD-10-CM

## 2018-07-09 DIAGNOSIS — Z90.5 SINGLE KIDNEY: ICD-10-CM

## 2018-07-09 PROCEDURE — 99214 OFFICE O/P EST MOD 30 MIN: CPT | Performed by: INTERNAL MEDICINE

## 2018-07-09 RX ORDER — METOPROLOL SUCCINATE 25 MG/1
12.5 TABLET, EXTENDED RELEASE ORAL DAILY
Qty: 90 TABLET | Refills: 3 | Status: SHIPPED | OUTPATIENT
Start: 2018-07-09 | End: 2019-08-03 | Stop reason: SDUPTHER

## 2018-08-21 ENCOUNTER — TELEPHONE (OUTPATIENT)
Dept: PRIMARY CARE CLINIC | Age: 43
End: 2018-08-21

## 2018-08-21 ENCOUNTER — HOSPITAL ENCOUNTER (OUTPATIENT)
Age: 43
Discharge: HOME | End: 2018-08-21
Payer: COMMERCIAL

## 2018-08-21 DIAGNOSIS — E11.9 TYPE 2 DIABETES MELLITUS WITHOUT COMPLICATION, WITHOUT LONG-TERM CURRENT USE OF INSULIN (HCC): ICD-10-CM

## 2018-08-21 DIAGNOSIS — E78.5 HYPERLIPIDEMIA, UNSPECIFIED HYPERLIPIDEMIA TYPE: ICD-10-CM

## 2018-08-21 LAB
-: ABNORMAL
ABSOLUTE EOS #: 0.37 K/UL (ref 0–0.44)
ABSOLUTE IMMATURE GRANULOCYTE: 0.08 K/UL (ref 0–0.3)
ABSOLUTE LYMPH #: 5.15 K/UL (ref 1.1–3.7)
ABSOLUTE MONO #: 0.62 K/UL (ref 0.1–1.2)
ALBUMIN SERPL-MCNC: 4.1 G/DL (ref 3.5–5.2)
ALBUMIN/GLOBULIN RATIO: 1.3 (ref 1–2.5)
ALP BLD-CCNC: 71 U/L (ref 35–104)
ALT SERPL-CCNC: 21 U/L (ref 5–33)
AMORPHOUS: ABNORMAL
ANION GAP SERPL CALCULATED.3IONS-SCNC: 10 MMOL/L (ref 9–17)
AST SERPL-CCNC: 12 U/L
BACTERIA: ABNORMAL
BASOPHILS # BLD: 1 % (ref 0–2)
BASOPHILS ABSOLUTE: 0.06 K/UL (ref 0–0.2)
BILIRUB SERPL-MCNC: 0.44 MG/DL (ref 0.3–1.2)
BILIRUBIN URINE: NEGATIVE
BUN BLDV-MCNC: 15 MG/DL (ref 6–20)
BUN/CREAT BLD: 22 (ref 9–20)
CALCIUM SERPL-MCNC: 9.1 MG/DL (ref 8.6–10.4)
CASTS UA: ABNORMAL /LPF
CHLORIDE BLD-SCNC: 99 MMOL/L (ref 98–107)
CHOLESTEROL/HDL RATIO: 5.5
CHOLESTEROL: 154 MG/DL
CO2: 24 MMOL/L (ref 20–31)
COLOR: YELLOW
COMMENT UA: ABNORMAL
CREAT SERPL-MCNC: 0.68 MG/DL (ref 0.5–0.9)
CREATININE URINE: 75.3 MG/DL (ref 28–217)
CRYSTALS, UA: ABNORMAL /HPF
DIFFERENTIAL TYPE: ABNORMAL
EOSINOPHILS RELATIVE PERCENT: 3 % (ref 1–4)
EPITHELIAL CELLS UA: ABNORMAL /HPF (ref 0–25)
ESTIMATED AVERAGE GLUCOSE: 183 MG/DL
GFR AFRICAN AMERICAN: >60 ML/MIN
GFR NON-AFRICAN AMERICAN: >60 ML/MIN
GFR SERPL CREATININE-BSD FRML MDRD: ABNORMAL ML/MIN/{1.73_M2}
GFR SERPL CREATININE-BSD FRML MDRD: ABNORMAL ML/MIN/{1.73_M2}
GLUCOSE BLD-MCNC: 204 MG/DL (ref 70–99)
GLUCOSE URINE: ABNORMAL
HBA1C MFR BLD: 8 % (ref 4.8–5.9)
HCT VFR BLD CALC: 42.4 % (ref 36.3–47.1)
HDLC SERPL-MCNC: 28 MG/DL
HEMOGLOBIN: 14.2 G/DL (ref 11.9–15.1)
IMMATURE GRANULOCYTES: 1 %
KETONES, URINE: NEGATIVE
LDL CHOLESTEROL: 59 MG/DL (ref 0–130)
LEUKOCYTE ESTERASE, URINE: NEGATIVE
LYMPHOCYTES # BLD: 40 % (ref 24–43)
MCH RBC QN AUTO: 32.4 PG (ref 25.2–33.5)
MCHC RBC AUTO-ENTMCNC: 33.5 G/DL (ref 28.4–34.8)
MCV RBC AUTO: 96.8 FL (ref 82.6–102.9)
MICROALBUMIN/CREAT 24H UR: <12 MG/L
MICROALBUMIN/CREAT UR-RTO: NORMAL MCG/MG CREAT
MONOCYTES # BLD: 5 % (ref 3–12)
MUCUS: ABNORMAL
NITRITE, URINE: NEGATIVE
NRBC AUTOMATED: 0 PER 100 WBC
OTHER OBSERVATIONS UA: ABNORMAL
PDW BLD-RTO: 12.6 % (ref 11.8–14.4)
PH UA: 7 (ref 5–9)
PLATELET # BLD: 279 K/UL (ref 138–453)
PLATELET ESTIMATE: ABNORMAL
PMV BLD AUTO: 10.1 FL (ref 8.1–13.5)
POTASSIUM SERPL-SCNC: 4.3 MMOL/L (ref 3.7–5.3)
PROTEIN UA: NEGATIVE
RBC # BLD: 4.38 M/UL (ref 3.95–5.11)
RBC # BLD: ABNORMAL 10*6/UL
RBC UA: ABNORMAL /HPF (ref 0–2)
RENAL EPITHELIAL, UA: ABNORMAL /HPF
SEG NEUTROPHILS: 50 % (ref 36–65)
SEGMENTED NEUTROPHILS ABSOLUTE COUNT: 6.49 K/UL (ref 1.5–8.1)
SODIUM BLD-SCNC: 133 MMOL/L (ref 135–144)
SPECIFIC GRAVITY UA: 1.01 (ref 1.01–1.02)
TOTAL PROTEIN: 7.3 G/DL (ref 6.4–8.3)
TRICHOMONAS: ABNORMAL
TRIGL SERPL-MCNC: 337 MG/DL
TURBIDITY: CLEAR
URINE HGB: NEGATIVE
UROBILINOGEN, URINE: NORMAL
VLDLC SERPL CALC-MCNC: ABNORMAL MG/DL (ref 1–30)
WBC # BLD: 12.8 K/UL (ref 3.5–11.3)
WBC # BLD: ABNORMAL 10*3/UL
WBC UA: ABNORMAL /HPF (ref 0–5)
YEAST: ABNORMAL

## 2018-08-21 PROCEDURE — 83036 HEMOGLOBIN GLYCOSYLATED A1C: CPT

## 2018-08-21 PROCEDURE — 85025 COMPLETE CBC W/AUTO DIFF WBC: CPT

## 2018-08-21 PROCEDURE — 81001 URINALYSIS AUTO W/SCOPE: CPT

## 2018-08-21 PROCEDURE — 80061 LIPID PANEL: CPT

## 2018-08-21 PROCEDURE — 80053 COMPREHEN METABOLIC PANEL: CPT

## 2018-08-21 PROCEDURE — 82570 ASSAY OF URINE CREATININE: CPT

## 2018-08-21 PROCEDURE — 82043 UR ALBUMIN QUANTITATIVE: CPT

## 2018-08-21 PROCEDURE — 36415 COLL VENOUS BLD VENIPUNCTURE: CPT

## 2018-08-22 ENCOUNTER — OFFICE VISIT (OUTPATIENT)
Dept: PRIMARY CARE CLINIC | Age: 43
End: 2018-08-22
Payer: COMMERCIAL

## 2018-08-22 VITALS
SYSTOLIC BLOOD PRESSURE: 102 MMHG | BODY MASS INDEX: 38 KG/M2 | DIASTOLIC BLOOD PRESSURE: 68 MMHG | TEMPERATURE: 97.8 F | RESPIRATION RATE: 20 BRPM | WEIGHT: 214.5 LBS | HEART RATE: 94 BPM

## 2018-08-22 DIAGNOSIS — Z72.0 TOBACCO ABUSE: ICD-10-CM

## 2018-08-22 DIAGNOSIS — D72.829 LEUKOCYTOSIS, UNSPECIFIED TYPE: ICD-10-CM

## 2018-08-22 DIAGNOSIS — E11.9 TYPE 2 DIABETES MELLITUS WITHOUT COMPLICATION, WITHOUT LONG-TERM CURRENT USE OF INSULIN (HCC): Primary | ICD-10-CM

## 2018-08-22 DIAGNOSIS — K21.9 GASTROESOPHAGEAL REFLUX DISEASE, ESOPHAGITIS PRESENCE NOT SPECIFIED: ICD-10-CM

## 2018-08-22 DIAGNOSIS — E78.2 MIXED HYPERLIPIDEMIA: ICD-10-CM

## 2018-08-22 PROCEDURE — 99214 OFFICE O/P EST MOD 30 MIN: CPT | Performed by: NURSE PRACTITIONER

## 2018-08-22 RX ORDER — RANITIDINE 150 MG/1
150 TABLET ORAL 2 TIMES DAILY
Qty: 180 TABLET | Refills: 0 | Status: SHIPPED | OUTPATIENT
Start: 2018-08-22 | End: 2018-11-17 | Stop reason: SDUPTHER

## 2018-08-22 RX ORDER — ASPIRIN 81 MG/1
81 TABLET ORAL DAILY
Qty: 90 TABLET | Refills: 0 | Status: SHIPPED | OUTPATIENT
Start: 2018-08-22 | End: 2019-01-17 | Stop reason: SDUPTHER

## 2018-08-22 RX ORDER — ATORVASTATIN CALCIUM 40 MG/1
40 TABLET, FILM COATED ORAL DAILY
Qty: 90 TABLET | Refills: 0 | Status: SHIPPED | OUTPATIENT
Start: 2018-08-22 | End: 2019-01-17 | Stop reason: SDUPTHER

## 2018-08-22 RX ORDER — LISINOPRIL 2.5 MG/1
2.5 TABLET ORAL DAILY
Qty: 90 TABLET | Refills: 0 | Status: SHIPPED | OUTPATIENT
Start: 2018-08-22 | End: 2019-01-17 | Stop reason: SDUPTHER

## 2018-08-22 ASSESSMENT — ENCOUNTER SYMPTOMS
HEARTBURN: 1
BLOOD IN STOOL: 0
ABDOMINAL PAIN: 0
RESPIRATORY NEGATIVE: 1
EYES NEGATIVE: 1
COUGH: 0
VOMITING: 0
TROUBLE SWALLOWING: 0
WHEEZING: 0
CONSTIPATION: 0
DIARRHEA: 1

## 2018-08-22 NOTE — PROGRESS NOTES
Due    DTaP/Tdap/Td vaccine (1 - Tdap) 12/31/2018 (Originally 7/9/1996)    Cervical cancer screen  12/31/2018 (Originally 7/9/1998)    HIV screen  12/31/2018 (Originally 7/9/1992)    Flu vaccine (1) 09/01/2018    Diabetic retinal exam  05/16/2019    Diabetic foot exam  05/17/2019    A1C test (Diabetic or Prediabetic)  08/21/2019    Diabetic microalbuminuria test  08/21/2019    Lipid screen  08/21/2019    Potassium monitoring  08/21/2019    Creatinine monitoring  08/21/2019    Pneumococcal med risk  Completed     Eat certain foods cause gas bloating, states has been like this since gallbladder removal.       Gastroesophageal Reflux   She complains of heartburn. She reports no abdominal pain, no coughing or no wheezing. c/o break thru heartburn, gas and bloating. This is a new problem. Episode onset: approx 1 month. Episode frequency: daily. The problem has been waxing and waning. The heartburn is of mild intensity. The heartburn does not wake her from sleep. The heartburn does not limit her activity. The heartburn doesn't change with position. The symptoms are aggravated by certain foods. Pertinent negatives include no melena or weight loss. Risk factors include smoking/tobacco exposure and obesity. Treatments tried: Omeprazole. The treatment provided mild relief. Diabetes   She presents for her follow-up diabetic visit. She has type 2 diabetes mellitus. No MedicAlert identification noted. Her disease course has been improving. There are no hypoglycemic associated symptoms. Pertinent negatives for hypoglycemia include no dizziness, headaches or nervousness/anxiousness. There are no diabetic associated symptoms. Pertinent negatives for diabetes include no weight loss. There are no hypoglycemic complications. Symptoms are improving. There are no diabetic complications. Risk factors for coronary artery disease include diabetes mellitus, obesity and tobacco exposure.  Current diabetic treatment includes oral agent (dual therapy). She is compliant with treatment all of the time. Her weight is stable. She is following a generally unhealthy diet. When asked about meal planning, she reported none. She has had a previous visit with a dietitian. She never participates in exercise. Her home blood glucose trend is decreasing steadily. (Monitors blood sugar approx once weekly, range 120-150) An ACE inhibitor/angiotensin II receptor blocker is being taken. She does not see a podiatrist.Eye exam is current. Past Medical History:     Past Medical History:   Diagnosis Date    Anxiety 2007    Depression 1993    GERD (gastroesophageal reflux disease) 2010    H/O cardiovascular stress test 07/02/2018    Largerly normal myocardial perfusion imaging with soft tissue artifact but without evidence of signinfcant myocardial ischemia or infarction. EF 75%. The pt duke treadmill score is 6.    H/O echocardiogram 07/02/2018    EF >70%. The LV wallt thickness is mildly incrased.  H/O tilt table evaluation 05/24/2018    Borderline abnormal head upright tilt table study. Although the pt heart rate, blood pressure and symptoms were suggestive of orthostatic intolerance. Therefore, if clinically suspicion remains high, a trial of empiric trreatment and/or re-testing may be indicated.  Hyperlipidemia 2010    Hypertension 2009    Kidney dysfunction     pt only has one kidney    Obesity 1999    Type 2 diabetes mellitus without complication ([facility] [address] 1120      Reviewed all health maintenance requirements and ordered appropriate tests  There are no preventive care reminders to display for this patient. Past Surgical History:     Past Surgical History:   Procedure Laterality Date    BLADDER SUSPENSION      CARPAL TUNNEL RELEASE      CHOLECYSTECTOMY      DILATION AND CURETTAGE OF UTERUS  2007    KIDNEY REMOVAL      left          Medications:     Prior to Admission medications    Medication Sig Start Date End Date Taking? Authorizing Provider   Liraglutide (VICTOZA) 18 MG/3ML SOPN SC injection 0.6 daily x one week, then 1.2 daily x one week then 1.8 daily 8/22/18  Yes RONN Bowens CNP   aspirin EC 81 MG EC tablet Take 1 tablet by mouth daily 8/22/18  Yes Samanta  RONN Espinoza CNP   lisinopril (ZESTRIL) 2.5 MG tablet Take 1 tablet by mouth daily 8/22/18  Yes Samanta  RONN Espinoza CNP   atorvastatin (LIPITOR) 40 MG tablet Take 1 tablet by mouth daily 8/22/18  Yes RONN Bowens CNP   metFORMIN (GLUCOPHAGE) 1000 MG tablet Take 1 tablet by mouth 2 times daily (with meals) 8/22/18  Yes Samanta RONN Pak CNP   SITagliptin (JANUVIA) 100 MG tablet Take 1 tablet by mouth daily 8/22/18  Yes RONN Bowens CNP   ranitidine (ZANTAC) 150 MG tablet Take 1 tablet by mouth 2 times daily 8/22/18  Yes RONN Bowens CNP   metoprolol succinate (TOPROL XL) 25 MG extended release tablet Take 0.5 tablets by mouth daily 7/9/18  Yes Francois Patel MD   omeprazole (PRILOSEC) 20 MG delayed release capsule Take 20 mg by mouth Daily   Yes Historical Provider, MD          Allergies:      Pcn [penicillins]      Social History:     Tobacco:    reports that she has been smoking Cigarettes. She has a 25.00 pack-year smoking history. She has never used smokeless tobacco.  Alcohol:      reports that she does not drink alcohol. Drug Use:  reports that she does not use drugs.       Family History:     Family History   Problem Relation Age of Onset    High Cholesterol Mother     Other Brother     Alcohol Abuse Maternal Grandmother     Cancer Maternal Grandmother     Depression Maternal Grandmother     Diabetes Maternal Grandmother     Heart Disease Maternal Grandmother     High Blood Pressure Maternal Grandmother     High Cholesterol Maternal Grandmother     Mental Illness Maternal Grandmother     Obesity Maternal Grandmother     Other Maternal Grandfather          Review of Systems:     Positive and Negative and tobacco cessation  2. Patient given educational materials - see patient instructions  3. Was a self-tracking handout given in paper form or via Deedhart? No  If yes, see orders or list here. 4.  Discussed use, benefit, and side effects of prescribed medications. Barriers to medication compliance addressed. All patient questions answered. Pt voiced understanding. 5.  Reviewed prior labs and health maintenance  6. Continue current medications, diet and exercise. Completed Refills   Requested Prescriptions     Signed Prescriptions Disp Refills    Liraglutide (VICTOZA) 18 MG/3ML SOPN SC injection 3 pen 0     Si.6 daily x one week, then 1.2 daily x one week then 1.8 daily    aspirin EC 81 MG EC tablet 90 tablet 0     Sig: Take 1 tablet by mouth daily    lisinopril (ZESTRIL) 2.5 MG tablet 90 tablet 0     Sig: Take 1 tablet by mouth daily    atorvastatin (LIPITOR) 40 MG tablet 90 tablet 0     Sig: Take 1 tablet by mouth daily    metFORMIN (GLUCOPHAGE) 1000 MG tablet 180 tablet 0     Sig: Take 1 tablet by mouth 2 times daily (with meals)    SITagliptin (JANUVIA) 100 MG tablet 90 tablet 0     Sig: Take 1 tablet by mouth daily    ranitidine (ZANTAC) 150 MG tablet 180 tablet 0     Sig: Take 1 tablet by mouth 2 times daily         Return in about 3 months (around 2018) for DM.     Electronically signed by Electronically signed by Jeff Perez CNP on 18 at 11:07 AM    (Please note that portions of this note were completed with a voice recognition program.  Efforts were made to edit the dictations but occasionally words are mis-transcribed.)

## 2018-08-24 ENCOUNTER — OFFICE VISIT (OUTPATIENT)
Dept: CARDIOLOGY | Age: 43
End: 2018-08-24
Payer: COMMERCIAL

## 2018-08-24 VITALS
DIASTOLIC BLOOD PRESSURE: 79 MMHG | HEIGHT: 62 IN | BODY MASS INDEX: 38.98 KG/M2 | HEART RATE: 79 BPM | OXYGEN SATURATION: 98 % | SYSTOLIC BLOOD PRESSURE: 118 MMHG | WEIGHT: 211.8 LBS

## 2018-08-24 DIAGNOSIS — E78.5 DYSLIPIDEMIA: ICD-10-CM

## 2018-08-24 DIAGNOSIS — R00.2 PALPITATIONS: Primary | ICD-10-CM

## 2018-08-24 DIAGNOSIS — Z72.0 TOBACCO ABUSE: ICD-10-CM

## 2018-08-24 DIAGNOSIS — Z90.5 SINGLE KIDNEY: ICD-10-CM

## 2018-08-24 PROCEDURE — 99213 OFFICE O/P EST LOW 20 MIN: CPT | Performed by: INTERNAL MEDICINE

## 2018-08-24 NOTE — PROGRESS NOTES
I Jose Armando Donato, GABI am scribing for and in the presence of Dr. Pam Lim    Patient: Galen Wilhelm  : 1975  Date of Visit: 2018    REASON FOR VISIT / CONSULTATION: Follow-up (6 week follow up. HX: Syncope, Hyperlipideamia, CP. Pt states she is feeling pretty good about 2 weeks after starting her new medication. Only a couple palpitations since last visit, which is an improvement. Denies CP, SOB, lightheadedness/dizziness. Weight last visit was 216 lbs. This visit weight is 211.8 lbs.)      Dear Kennedi Fraser, RONN - CNP      I had the pleasure of seeing  Galen Wilhelm in my office today. Ms. Jeramie Barraza is a 41 y.o. female with a recent history of syncope on May 9th 2018 due to laughing so hard. She also had high blood pressure when she was pregnant. She also has a history of heart palpitations, but had these most often when she was pregnant. Ms. Jeramie Barraza is here for a follow up and reports doing much better since her last visit. This made her nervous because she is afraid that this medication indicates a heart issue that the medication helps. She says she is having less palpitations only having a couple since her last visit and all other symptoms have been improved. She has been having some gastric issues and has started taking a probiotic to help relieve this. She attributes her stomach medications to taking metformin. She reports having a relatively good exercise tolerance. She reports doing her own household chores and doing the grocery shopping. Denies any abdominal pain, aside from bloating, bleeding problems, problems with her medications or any other concerns at this time. She is an everyday smoker and smokes about a pack a day. She has one Kidney she was 11 weeks old when it was taken out. All what her mom could tell her was that it looked like a bunch of grapes. She is scheduled to see a nephrologist in October.  She does have elevated chloresterol levels and she does have a history of diabetes. Tilt table done on 5/24/2018- Borderline abnormal head upright tilt table study. EKG done on 5/14/2018- normal sinus present. No ischemic changes. Stress test done on 7/2/2018- Largerly normal myocardial perfusion imaging with soft tissue artifact but without evidence of signinfcant myocardial ischemia or infarction. EF 75%. The pt duke treadmill score is 6. Echo done on 7/2/2018- EF >70%. The LV wallt thickness is mildly incrased. Past Medical History:   Diagnosis Date    Anxiety 2007    Depression 1993    GERD (gastroesophageal reflux disease) 2010    H/O cardiovascular stress test 07/02/2018    Largerly normal myocardial perfusion imaging with soft tissue artifact but without evidence of signinfcant myocardial ischemia or infarction. EF 75%. The pt duke treadmill score is 6.    H/O echocardiogram 07/02/2018    EF >70%. The LV wallt thickness is mildly incrased.  H/O tilt table evaluation 05/24/2018    Borderline abnormal head upright tilt table study. Although the pt heart rate, blood pressure and symptoms were suggestive of orthostatic intolerance. Therefore, if clinically suspicion remains high, a trial of empiric trreatment and/or re-testing may be indicated.  Hyperlipidemia 2010    Hypertension 2009    Kidney dysfunction     pt only has one kidney    Obesity 1999    Type 2 diabetes mellitus without complication ([facility] [address] 3548       CURRENT ALLERGIES: Pcn [penicillins] REVIEW OF SYSTEMS: 14 systems were reviewed. Pertinent positives and negatives as above, all else negative.      Past Surgical History:   Procedure Laterality Date    BLADDER SUSPENSION      CARPAL TUNNEL RELEASE      CHOLECYSTECTOMY      DILATION AND CURETTAGE OF UTERUS  2007    KIDNEY REMOVAL      left    Social History:  Social History   Substance Use Topics    Smoking status: Current Every Day Smoker     Packs/day: 1.00     Years: 25.00     Types: Cigarettes    Smokeless tobacco: Never Used    Alcohol use No      Comment: biweekly or monthly        CURRENT MEDICATIONS:  Outpatient Prescriptions Marked as Taking for the 8/24/18 encounter (Office Visit) with Ladarius Scott MD   Medication Sig Dispense Refill    CRANBERRY-VITAMIN C PO Take by mouth      Probiotic Product (PROBIOTIC DAILY PO) Take by mouth      Liraglutide (VICTOZA) 18 MG/3ML SOPN SC injection 0.6 daily x one week, then 1.2 daily x one week then 1.8 daily 3 pen 0    aspirin EC 81 MG EC tablet Take 1 tablet by mouth daily 90 tablet 0    lisinopril (ZESTRIL) 2.5 MG tablet Take 1 tablet by mouth daily 90 tablet 0    atorvastatin (LIPITOR) 40 MG tablet Take 1 tablet by mouth daily 90 tablet 0    metFORMIN (GLUCOPHAGE) 1000 MG tablet Take 1 tablet by mouth 2 times daily (with meals) 180 tablet 0    SITagliptin (JANUVIA) 100 MG tablet Take 1 tablet by mouth daily 90 tablet 0    ranitidine (ZANTAC) 150 MG tablet Take 1 tablet by mouth 2 times daily 180 tablet 0    metoprolol succinate (TOPROL XL) 25 MG extended release tablet Take 0.5 tablets by mouth daily 90 tablet 3       FAMILY HISTORY: family history includes Alcohol Abuse in her maternal grandmother; Cancer in her maternal grandmother; Depression in her maternal grandmother; Diabetes in her maternal grandmother; Heart Disease in her maternal grandmother; High Blood Pressure in her maternal grandmother; High Cholesterol in her maternal grandmother and mother; Mental Illness in her maternal grandmother; Obesity in her maternal grandmother; Other in her brother and maternal grandfather. PHYSICAL EXAM:   /79 (Site: Left Arm, Position: Sitting, Cuff Size: Medium Adult)   Pulse 79   Ht 5' 2\" (1.575 m)   Wt 211 lb 12.8 oz (96.1 kg)   SpO2 98%   BMI 38.74 kg/m²  Body mass index is 38.74 kg/m². Constitutional: She is oriented to person, place, and time. She appears well-developed and well-nourished. In no acute distress.    HEENT: Normocephalic and loses weight and she was very receptive to this. Dyslipidemia  Continue Lipitor 40 mg daily. · Tobacco Abuse Counseling:   Discussed the dangers of tobacco abuse as well as multiple methods for trying to quit smoking. In the end, Ms. Montez said that he did not want any help at the current time, but would continue to try and quit smoking on his own. · Kidney:  · She is scheduled to see nephrologist in October  · US of the kidney revealed normal appearing hypertrophic solitary right kidney. Finally, I recommended that she continue her current medications and follow up with you as previously scheduled. FOLLOW UP:   I told Ms. Montez to call my office if she had any problems, but otherwise I asked her to Return in about 1 year (around 8/24/2019) for Follow up. However, I would be happy to see her sooner should the need arise. Sincerely,  Claudetta Drown, MD, F.A.C.C. [facility] Special[facility]    [address]  Phone: [phone], Fax: [phone]     I believe that the risk of significant morbidity and mortality related to the patient's current medical conditions are: low-intermediate. The documentation recorded by the scribe, accurately and completely reflects the services I personally performed and the decisions made by me. Edwin He MD, F.A.C.C.  August 24, 2018

## 2018-08-28 ENCOUNTER — TELEPHONE (OUTPATIENT)
Dept: PRIMARY CARE CLINIC | Age: 43
End: 2018-08-28

## 2018-08-28 DIAGNOSIS — D72.829 LEUKOCYTOSIS, UNSPECIFIED TYPE: Primary | ICD-10-CM

## 2018-08-28 NOTE — TELEPHONE ENCOUNTER
Called patient and informed her that Jose A Silverio states many times smokers do have an elevated WBC and a referral has been placed to a hematologist for evaluation. Verbalizes understanding.

## 2018-08-28 NOTE — TELEPHONE ENCOUNTER
Message from [facility] to Elysa Hashimoto, APRN - CNP sent at 8/27/2018  2:51 PM -----   Eusebio Roche, I was going over my test results and while we did talk about my higher WBC count we did not talk about my higher absolute lymph #. Are they connected? My WBC is slowly climbing, is that normal with smokers? If there was a problem, would you expect these values to be higher? Thank you,   Brad Neville       Please inform patient many times we do see an elevation in WBC with patients who smoke. Do to patient concerns, a referral has been placed to hematology for evaluation. Please inform patient.    Thank you

## 2018-09-06 ENCOUNTER — OFFICE VISIT (OUTPATIENT)
Dept: ONCOLOGY | Age: 43
End: 2018-09-06
Payer: COMMERCIAL

## 2018-09-06 ENCOUNTER — HOSPITAL ENCOUNTER (EMERGENCY)
Age: 43
Discharge: HOME | End: 2018-09-06
Attending: EMERGENCY MEDICINE
Payer: COMMERCIAL

## 2018-09-06 VITALS
OXYGEN SATURATION: 97 % | SYSTOLIC BLOOD PRESSURE: 112 MMHG | WEIGHT: 210 LBS | HEIGHT: 62 IN | RESPIRATION RATE: 16 BRPM | BODY MASS INDEX: 38.64 KG/M2 | DIASTOLIC BLOOD PRESSURE: 76 MMHG | HEART RATE: 100 BPM | TEMPERATURE: 97.2 F

## 2018-09-06 VITALS
SYSTOLIC BLOOD PRESSURE: 103 MMHG | DIASTOLIC BLOOD PRESSURE: 61 MMHG | RESPIRATION RATE: 18 BRPM | HEART RATE: 100 BPM | WEIGHT: 211 LBS | HEIGHT: 62 IN | BODY MASS INDEX: 38.83 KG/M2 | TEMPERATURE: 98.6 F

## 2018-09-06 DIAGNOSIS — R16.1 SPLENOMEGALY: ICD-10-CM

## 2018-09-06 DIAGNOSIS — D72.829 LEUKOCYTOSIS, UNSPECIFIED TYPE: Primary | ICD-10-CM

## 2018-09-06 DIAGNOSIS — L02.91 ABSCESS: Primary | ICD-10-CM

## 2018-09-06 DIAGNOSIS — R91.8 LUNG MASS: ICD-10-CM

## 2018-09-06 PROCEDURE — 10060 I&D ABSCESS SIMPLE/SINGLE: CPT

## 2018-09-06 PROCEDURE — 2500000003 HC RX 250 WO HCPCS

## 2018-09-06 PROCEDURE — 6370000000 HC RX 637 (ALT 250 FOR IP): Performed by: EMERGENCY MEDICINE

## 2018-09-06 PROCEDURE — 87205 SMEAR GRAM STAIN: CPT

## 2018-09-06 PROCEDURE — 87070 CULTURE OTHR SPECIMN AEROBIC: CPT

## 2018-09-06 PROCEDURE — 99283 EMERGENCY DEPT VISIT LOW MDM: CPT

## 2018-09-06 PROCEDURE — 87186 SC STD MICRODIL/AGAR DIL: CPT

## 2018-09-06 PROCEDURE — 87077 CULTURE AEROBIC IDENTIFY: CPT

## 2018-09-06 PROCEDURE — 99204 OFFICE O/P NEW MOD 45 MIN: CPT | Performed by: INTERNAL MEDICINE

## 2018-09-06 RX ORDER — SULFAMETHOXAZOLE AND TRIMETHOPRIM 800; 160 MG/1; MG/1
1 TABLET ORAL ONCE
Status: COMPLETED | OUTPATIENT
Start: 2018-09-06 | End: 2018-09-06

## 2018-09-06 RX ORDER — SULFAMETHOXAZOLE AND TRIMETHOPRIM 800; 160 MG/1; MG/1
1 TABLET ORAL 2 TIMES DAILY
Qty: 20 TABLET | Refills: 0 | Status: SHIPPED | OUTPATIENT
Start: 2018-09-06 | End: 2018-09-16

## 2018-09-06 RX ORDER — CIPROFLOXACIN 500 MG/1
500 TABLET, FILM COATED ORAL 2 TIMES DAILY
Qty: 20 TABLET | Refills: 0 | Status: SHIPPED | OUTPATIENT
Start: 2018-09-06 | End: 2018-09-16

## 2018-09-06 RX ORDER — HYDROCODONE BITARTRATE AND ACETAMINOPHEN 5; 325 MG/1; MG/1
1 TABLET ORAL EVERY 6 HOURS PRN
Qty: 10 TABLET | Refills: 0 | Status: SHIPPED | OUTPATIENT
Start: 2018-09-06 | End: 2018-09-09

## 2018-09-06 RX ORDER — CIPROFLOXACIN 500 MG/1
500 TABLET, FILM COATED ORAL ONCE
Status: COMPLETED | OUTPATIENT
Start: 2018-09-06 | End: 2018-09-06

## 2018-09-06 RX ORDER — IBUPROFEN 200 MG
800 TABLET ORAL EVERY 6 HOURS PRN
COMMUNITY

## 2018-09-06 RX ORDER — LIDOCAINE HYDROCHLORIDE 10 MG/ML
INJECTION, SOLUTION INFILTRATION; PERINEURAL
Status: COMPLETED
Start: 2018-09-06 | End: 2018-09-06

## 2018-09-06 RX ORDER — LIDOCAINE HYDROCHLORIDE 10 MG/ML
10 INJECTION, SOLUTION EPIDURAL; INFILTRATION; INTRACAUDAL; PERINEURAL ONCE
Status: DISCONTINUED | OUTPATIENT
Start: 2018-09-06 | End: 2018-09-06 | Stop reason: HOSPADM

## 2018-09-06 RX ADMIN — SULFAMETHOXAZOLE AND TRIMETHOPRIM 1 TABLET: 800; 160 TABLET ORAL at 06:06

## 2018-09-06 RX ADMIN — LIDOCAINE HYDROCHLORIDE 200 MG: 10 INJECTION, SOLUTION INFILTRATION; PERINEURAL at 06:06

## 2018-09-06 RX ADMIN — CIPROFLOXACIN HYDROCHLORIDE 500 MG: 500 TABLET, FILM COATED ORAL at 06:06

## 2018-09-06 ASSESSMENT — ENCOUNTER SYMPTOMS
DIARRHEA: 0
COLOR CHANGE: 1
BACK PAIN: 0
NAUSEA: 0
NAUSEA: 0
BACK PAIN: 0
EYE REDNESS: 0
VOMITING: 0
CHEST TIGHTNESS: 0
COUGH: 0
SHORTNESS OF BREATH: 0
ABDOMINAL PAIN: 0
CONSTIPATION: 0
WHEEZING: 0
BLOOD IN STOOL: 0
ABDOMINAL PAIN: 0
COLOR CHANGE: 0
EYE ITCHING: 0

## 2018-09-06 ASSESSMENT — PAIN SCALES - GENERAL
PAINLEVEL_OUTOF10: 6
PAINLEVEL_OUTOF10: 6

## 2018-09-06 ASSESSMENT — PAIN DESCRIPTION - LOCATION: LOCATION: BUTTOCKS

## 2018-09-06 ASSESSMENT — PAIN DESCRIPTION - FREQUENCY: FREQUENCY: CONTINUOUS

## 2018-09-06 ASSESSMENT — PAIN DESCRIPTION - PAIN TYPE: TYPE: ACUTE PAIN

## 2018-09-06 ASSESSMENT — PAIN DESCRIPTION - ORIENTATION: ORIENTATION: LEFT

## 2018-09-06 ASSESSMENT — PAIN DESCRIPTION - DESCRIPTORS: DESCRIPTORS: ACHING;BURNING

## 2018-09-06 NOTE — ED PROVIDER NOTES
 Other Brother     Alcohol Abuse Maternal Grandmother     Cancer Maternal Grandmother     Depression Maternal Grandmother     Diabetes Maternal Grandmother     Heart Disease Maternal Grandmother     High Blood Pressure Maternal Grandmother     High Cholesterol Maternal Grandmother     Mental Illness Maternal Grandmother     Obesity Maternal Grandmother     Other Maternal Grandfather        Past Surgical History:   Procedure Laterality Date    BLADDER SUSPENSION      CARPAL TUNNEL RELEASE      CHOLECYSTECTOMY      DILATION AND CURETTAGE OF UTERUS  2007    KIDNEY REMOVAL      left       Social History     Social History    Marital status:      Spouse name: N/A    Number of children: N/A    Years of education: N/A     Social History Main Topics    Smoking status: Current Every Day Smoker     Packs/day: 1.00     Years: 25.00     Types: Cigarettes    Smokeless tobacco: Never Used    Alcohol use No      Comment: biweekly or monthly    Drug use: No    Sexual activity: Yes     Other Topics Concern    None     Social History Narrative    None       MDM the patient is given pain medication and to antibiotics and advised follow-up soon with her PCP. Labs      Radiology      EKG Interpretation.         Summation      Patient Course:  Successful IND of abscess    ED Medications administered this visit:    Medications   lidocaine 1 % injection (not administered)   hydrocodone-acetaminophen (NORCO) tablet 5-325 mg (STARTER PACK) (not administered)   ciprofloxacin (CIPRO) tablet 500 mg (not administered)   sulfamethoxazole-trimethoprim (BACTRIM DS;SEPTRA DS) 800-160 MG per tablet 1 tablet (not administered)   lidocaine PF 1 % injection 10 mL (not administered)       New Prescriptions from this visit:    New Prescriptions    CIPROFLOXACIN (CIPRO) 500 MG TABLET    Take 1 tablet by mouth 2 times daily for 10 days    HYDROCODONE-ACETAMINOPHEN (NORCO) 5-325 MG PER TABLET    Take 1 tablet by mouth every 6 hours as needed for Pain for up to 10 doses. .    SULFAMETHOXAZOLE-TRIMETHOPRIM (BACTRIM DS) 800-160 MG PER TABLET    Take 1 tablet by mouth 2 times daily for 10 days       Follow-up:  Johana Sequeira, APRN - CNP  [address] [facility] [id number] [phone]    Schedule an appointment as soon as possible for a visit           Final Impression:   1. Abscess               (Please note that portions of this note were completed with a voice recognition program.  Efforts were made to edit the dictations but occasionally words are mis-transcribed. )       April Tubbs MD  09/06/18 [id number]

## 2018-09-06 NOTE — LETTER
9/6/2018     RONN Andersen CNP   [address] [facility] [address]    Dear RONN Knott CNP, and Dr. Jimmie Cruz: Thank you for referring Americo Abebe, 1975, to me for evaluation. Below are the relevant portions of my assessment and plan of care. [facility]  [facility]  [address] [place] [id number]  Dept: [phone]  Dept Fax: [phone]  Americo Abebe is a 41 y.o. female who presents today for follow up of her   Chief Complaint   Patient presents with    New Patient     leukocytosis Pt. states she had a boil lanced this morning         HPI Americo Abebe is a 68-year-old lady who was referred for evaluation of long-standing leukocytosis. She states that she has recurrent boils for a long time and have to be lanced. She  used to have them more frequently in the past and recently they have decreased in frequency. She also smokes about 1 pack of cigarettes a day for several years. She had one of the abscesses lanced today and is on antibiotics. Her last CBC done on 821 and 18 showed a white count of 12.8 with normal neutrophils but elevated lymphocytes . she denies any fevers or night sweats. No weight loss. She has one kidney. The other one was removed when she was a baby for polycystic  kidney . She also has a history of diabetes mellitus and complains of being bloated all the time which she thinks might be from metformin. Current Outpatient Prescriptions   Medication Sig Dispense Refill    ibuprofen (ADVIL;MOTRIN) 200 MG tablet Take 800 mg by mouth every 6 hours as needed for Pain      HYDROcodone-acetaminophen (NORCO) 5-325 MG per tablet Take 1 tablet by mouth every 6 hours as needed for Pain for up to 10 doses. . 10 tablet 0    ciprofloxacin (CIPRO) 500 MG tablet Take 1 tablet by mouth 2 times daily for 10 days 20 tablet 0  sulfamethoxazole-trimethoprim (BACTRIM DS) 800-160 MG per tablet Take 1 tablet by mouth 2 times daily for 10 days 20 tablet 0    CRANBERRY-VITAMIN C PO Take by mouth      Probiotic Product (PROBIOTIC DAILY PO) Take by mouth      Insulin Pen Needle (B-D ULTRAFINE III SHORT PEN) 31G X 8 MM MISC 1 each by Does not apply route daily 100 each 3    Liraglutide (VICTOZA) 18 MG/3ML SOPN SC injection 0.6 daily x one week, then 1.2 daily x one week then 1.8 daily 3 pen 0    aspirin EC 81 MG EC tablet Take 1 tablet by mouth daily 90 tablet 0    lisinopril (ZESTRIL) 2.5 MG tablet Take 1 tablet by mouth daily 90 tablet 0    atorvastatin (LIPITOR) 40 MG tablet Take 1 tablet by mouth daily 90 tablet 0    metFORMIN (GLUCOPHAGE) 1000 MG tablet Take 1 tablet by mouth 2 times daily (with meals) 180 tablet 0    SITagliptin (JANUVIA) 100 MG tablet Take 1 tablet by mouth daily 90 tablet 0    ranitidine (ZANTAC) 150 MG tablet Take 1 tablet by mouth 2 times daily 180 tablet 0    metoprolol succinate (TOPROL XL) 25 MG extended release tablet Take 0.5 tablets by mouth daily 90 tablet 3     No current facility-administered medications for this visit. Allergies   Allergen Reactions    Pcn [Penicillins] Rash       Past Medical History:   Diagnosis Date    Anxiety 2007    Depression 1993    GERD (gastroesophageal reflux disease) 2010    H/O cardiovascular stress test 07/02/2018    Largerly normal myocardial perfusion imaging with soft tissue artifact but without evidence of signinfcant myocardial ischemia or infarction. EF 75%. The pt duke treadmill score is 6.    H/O echocardiogram 07/02/2018    EF >70%. The LV wallt thickness is mildly incrased.  H/O tilt table evaluation 05/24/2018    Borderline abnormal head upright tilt table study. Although the pt heart rate, blood pressure and symptoms were suggestive of orthostatic intolerance.  Therefore, if clinically suspicion remains high, a trial of empiric trreatment and/or re-testing may be indicated.  Hyperlipidemia 2010    Hypertension 2009    Kidney dysfunction     pt only has one kidney    Obesity 1999    Type 2 diabetes mellitus without complication ([facility] [address] 3831        Past Surgical History:   Procedure Laterality Date    BLADDER SUSPENSION      CARPAL TUNNEL RELEASE      CHOLECYSTECTOMY      DILATION AND CURETTAGE OF UTERUS  2007    KIDNEY REMOVAL      left       Family History   Problem Relation Age of Onset    High Cholesterol Mother     Other Brother     Alcohol Abuse Maternal Grandmother     Cancer Maternal Grandmother     Depression Maternal Grandmother     Diabetes Maternal Grandmother     Heart Disease Maternal Grandmother     High Blood Pressure Maternal Grandmother     High Cholesterol Maternal Grandmother     Mental Illness Maternal Grandmother     Obesity Maternal Grandmother     Other Maternal Grandfather        Social History   Substance Use Topics    Smoking status: Current Every Day Smoker     Packs/day: 1.00     Years: 25.00     Types: Cigarettes    Smokeless tobacco: Never Used    Alcohol use No      Comment: biweekly or monthly          The Past Medical History, Past Surgical History, Past Family History and Past Social History have been reviewed      Review of Systems   Constitutional: Negative for activity change, appetite change, chills, diaphoresis, fatigue, fever and unexpected weight change. HENT: Negative for congestion, hearing loss, mouth sores and nosebleeds. Eyes: Negative for redness, itching and visual disturbance. Respiratory: Negative for cough, chest tightness, shortness of breath and wheezing. Cardiovascular: Negative for chest pain, palpitations and leg swelling. Gastrointestinal: Negative for abdominal pain, blood in stool, constipation, diarrhea, nausea and vomiting.    Genitourinary: Negative for decreased urine volume, difficulty urinating,

## 2018-09-06 NOTE — PROGRESS NOTES
CURETTAGE OF UTERUS  2007    KIDNEY REMOVAL      left       Family History   Problem Relation Age of Onset    High Cholesterol Mother     Other Brother     Alcohol Abuse Maternal Grandmother     Cancer Maternal Grandmother     Depression Maternal Grandmother     Diabetes Maternal Grandmother     Heart Disease Maternal Grandmother     High Blood Pressure Maternal Grandmother     High Cholesterol Maternal Grandmother     Mental Illness Maternal Grandmother     Obesity Maternal Grandmother     Other Maternal Grandfather        Social History   Substance Use Topics    Smoking status: Current Every Day Smoker     Packs/day: 1.00     Years: 25.00     Types: Cigarettes    Smokeless tobacco: Never Used    Alcohol use No      Comment: biweekly or monthly          The Past Medical History, Past Surgical History, Past Family History and Past Social History have been reviewed      Review of Systems   Constitutional: Negative for activity change, appetite change, chills, diaphoresis, fatigue, fever and unexpected weight change. HENT: Negative for congestion, hearing loss, mouth sores and nosebleeds. Eyes: Negative for redness, itching and visual disturbance. Respiratory: Negative for cough, chest tightness, shortness of breath and wheezing. Cardiovascular: Negative for chest pain, palpitations and leg swelling. Gastrointestinal: Negative for abdominal pain, blood in stool, constipation, diarrhea, nausea and vomiting. Genitourinary: Negative for decreased urine volume, difficulty urinating, dysuria, flank pain, frequency, hematuria, pelvic pain and urgency. Musculoskeletal: Negative for arthralgias, back pain, joint swelling and myalgias. Skin: Negative for color change, pallor and rash. Gets skin abcesses frequently   Neurological: Negative for dizziness, seizures, syncope, weakness, light-headedness, numbness and headaches. Hematological: Negative for adenopathy.  Does not bruise/bleed Contrast    BUN & Creatinine     Leukocytosis with lymphocytosis. Considering her history of recurrent skin infections as well as smoking it might be a leukemoid reaction. She however does have lymphocytosis therefore an underlying lymphoproliferative disorder cannot be ruled out. At this time will do a preliminary workup including autoimmune workup as well as paraprotein studies and see her back in 3 weeks. Those are nonconclusive we will also do a flow cytometry. PLAN:     Return in about 3 weeks (around 9/27/2018) for leucocytosis. Orders Placed This Encounter   Procedures    CT ABDOMEN PELVIS W WO CONTRAST     Standing Status:   Future     Standing Expiration Date:   9/6/2019    CT Chest W Contrast     Standing Status:   Future     Standing Expiration Date:   9/6/2019    TIM     Standing Status:   Future     Standing Expiration Date:   9/6/2019    Anti-DNA Antibody, Double-Stranded     Standing Status:   Future     Standing Expiration Date:   9/6/2019    Electrophoresis Protein, Serum without Reflex to Immunofixation     Standing Status:   Future     Standing Expiration Date:   9/6/2019    Immunofixation electrophoresis     Standing Status:   Future     Standing Expiration Date:   9/6/2019    BUN & Creatinine     Standing Status:   Future     Standing Expiration Date:   9/6/2019     No orders of the defined types were placed in this encounter.           Electronically signed by Yolie Fitzgerald MD on 9/6/2018 at 4:46 PM

## 2018-09-09 LAB
CULTURE: ABNORMAL
DIRECT EXAM: ABNORMAL
Lab: ABNORMAL
ORGANISM: ABNORMAL
SPECIMEN DESCRIPTION: ABNORMAL
STATUS: ABNORMAL

## 2018-09-17 ENCOUNTER — HOSPITAL ENCOUNTER (OUTPATIENT)
Dept: CT IMAGING | Age: 43
Discharge: HOME | End: 2018-09-19
Payer: COMMERCIAL

## 2018-09-17 DIAGNOSIS — R91.8 LUNG MASS: ICD-10-CM

## 2018-09-17 DIAGNOSIS — D72.829 LEUKOCYTOSIS, UNSPECIFIED TYPE: ICD-10-CM

## 2018-09-17 DIAGNOSIS — R16.1 SPLENOMEGALY: ICD-10-CM

## 2018-09-17 PROCEDURE — 71260 CT THORAX DX C+: CPT

## 2018-09-17 PROCEDURE — 6360000004 HC RX CONTRAST MEDICATION: Performed by: INTERNAL MEDICINE

## 2018-09-17 PROCEDURE — 74177 CT ABD & PELVIS W/CONTRAST: CPT

## 2018-09-17 RX ADMIN — IOPAMIDOL 75 ML: 755 INJECTION, SOLUTION INTRAVENOUS at 09:30

## 2018-09-17 RX ADMIN — IOHEXOL 50 ML: 240 INJECTION, SOLUTION INTRATHECAL; INTRAVASCULAR; INTRAVENOUS; ORAL at 09:30

## 2018-09-18 DIAGNOSIS — E11.9 TYPE 2 DIABETES MELLITUS WITHOUT COMPLICATION, WITHOUT LONG-TERM CURRENT USE OF INSULIN (HCC): ICD-10-CM

## 2018-09-18 RX ORDER — SITAGLIPTIN 100 MG/1
TABLET, FILM COATED ORAL
Qty: 30 TABLET | Refills: 3 | OUTPATIENT
Start: 2018-09-18

## 2018-09-18 RX ORDER — LIRAGLUTIDE 6 MG/ML
INJECTION SUBCUTANEOUS
Qty: 9 PEN | Refills: 0 | Status: SHIPPED | OUTPATIENT
Start: 2018-09-18 | End: 2018-12-20 | Stop reason: SDUPTHER

## 2018-09-27 ENCOUNTER — HOSPITAL ENCOUNTER (EMERGENCY)
Age: 43
Discharge: HOME | End: 2018-09-27
Attending: EMERGENCY MEDICINE
Payer: COMMERCIAL

## 2018-09-27 ENCOUNTER — APPOINTMENT (OUTPATIENT)
Dept: CT IMAGING | Age: 43
End: 2018-09-27
Payer: COMMERCIAL

## 2018-09-27 VITALS
HEART RATE: 84 BPM | SYSTOLIC BLOOD PRESSURE: 101 MMHG | RESPIRATION RATE: 18 BRPM | DIASTOLIC BLOOD PRESSURE: 76 MMHG | OXYGEN SATURATION: 97 % | TEMPERATURE: 98 F

## 2018-09-27 DIAGNOSIS — R31.9 HEMATURIA, UNSPECIFIED TYPE: ICD-10-CM

## 2018-09-27 DIAGNOSIS — R10.9 RIGHT FLANK PAIN: Primary | ICD-10-CM

## 2018-09-27 LAB
-: ABNORMAL
ABSOLUTE EOS #: 0.35 K/UL (ref 0–0.44)
ABSOLUTE IMMATURE GRANULOCYTE: 0.03 K/UL (ref 0–0.3)
ABSOLUTE LYMPH #: 3.34 K/UL (ref 1.1–3.7)
ABSOLUTE MONO #: 0.4 K/UL (ref 0.1–1.2)
ALBUMIN SERPL-MCNC: 4 G/DL (ref 3.5–5.2)
ALBUMIN/GLOBULIN RATIO: 1.5 (ref 1–2.5)
ALP BLD-CCNC: 75 U/L (ref 35–104)
ALT SERPL-CCNC: 19 U/L (ref 5–33)
AMORPHOUS: ABNORMAL
ANION GAP SERPL CALCULATED.3IONS-SCNC: 13 MMOL/L (ref 9–17)
AST SERPL-CCNC: 16 U/L
BACTERIA: ABNORMAL
BASOPHILS # BLD: 0 % (ref 0–2)
BASOPHILS ABSOLUTE: 0.04 K/UL (ref 0–0.2)
BILIRUB SERPL-MCNC: 0.32 MG/DL (ref 0.3–1.2)
BILIRUBIN URINE: NEGATIVE
BUN BLDV-MCNC: 9 MG/DL (ref 6–20)
BUN/CREAT BLD: 15 (ref 9–20)
CALCIUM SERPL-MCNC: 8.8 MG/DL (ref 8.6–10.4)
CASTS UA: ABNORMAL /LPF
CHLORIDE BLD-SCNC: 99 MMOL/L (ref 98–107)
CO2: 22 MMOL/L (ref 20–31)
COLOR: YELLOW
COMMENT UA: ABNORMAL
CREAT SERPL-MCNC: 0.59 MG/DL (ref 0.5–0.9)
CRYSTALS, UA: ABNORMAL /HPF
DIFFERENTIAL TYPE: NORMAL
EOSINOPHILS RELATIVE PERCENT: 3 % (ref 1–4)
EPITHELIAL CELLS UA: ABNORMAL /HPF (ref 0–25)
GFR AFRICAN AMERICAN: >60 ML/MIN
GFR NON-AFRICAN AMERICAN: >60 ML/MIN
GFR SERPL CREATININE-BSD FRML MDRD: ABNORMAL ML/MIN/{1.73_M2}
GFR SERPL CREATININE-BSD FRML MDRD: ABNORMAL ML/MIN/{1.73_M2}
GLUCOSE BLD-MCNC: 181 MG/DL (ref 70–99)
GLUCOSE URINE: NEGATIVE
HCG(URINE) PREGNANCY TEST: NEGATIVE
HCT VFR BLD CALC: 43.3 % (ref 36.3–47.1)
HEMOGLOBIN: 14.5 G/DL (ref 11.9–15.1)
IMMATURE GRANULOCYTES: 0 %
KETONES, URINE: NEGATIVE
LEUKOCYTE ESTERASE, URINE: NEGATIVE
LYMPHOCYTES # BLD: 31 % (ref 24–43)
MCH RBC QN AUTO: 32.5 PG (ref 25.2–33.5)
MCHC RBC AUTO-ENTMCNC: 33.5 G/DL (ref 28.4–34.8)
MCV RBC AUTO: 97.1 FL (ref 82.6–102.9)
MONOCYTES # BLD: 4 % (ref 3–12)
MUCUS: ABNORMAL
NITRITE, URINE: NEGATIVE
NRBC AUTOMATED: 0 PER 100 WBC
OTHER OBSERVATIONS UA: ABNORMAL
PDW BLD-RTO: 12 % (ref 11.8–14.4)
PH UA: 7.5 (ref 5–9)
PLATELET # BLD: 242 K/UL (ref 138–453)
PLATELET ESTIMATE: NORMAL
PMV BLD AUTO: 9.9 FL (ref 8.1–13.5)
POTASSIUM SERPL-SCNC: 3.9 MMOL/L (ref 3.7–5.3)
PROTEIN UA: NEGATIVE
RBC # BLD: 4.46 M/UL (ref 3.95–5.11)
RBC # BLD: NORMAL 10*6/UL
RBC UA: ABNORMAL /HPF (ref 0–2)
RENAL EPITHELIAL, UA: ABNORMAL /HPF
SEG NEUTROPHILS: 62 % (ref 36–65)
SEGMENTED NEUTROPHILS ABSOLUTE COUNT: 6.52 K/UL (ref 1.5–8.1)
SODIUM BLD-SCNC: 134 MMOL/L (ref 135–144)
SPECIFIC GRAVITY UA: 1.01 (ref 1.01–1.02)
TOTAL PROTEIN: 6.7 G/DL (ref 6.4–8.3)
TRICHOMONAS: ABNORMAL
TURBIDITY: CLEAR
URINE HGB: ABNORMAL
UROBILINOGEN, URINE: NORMAL
WBC # BLD: 10.7 K/UL (ref 3.5–11.3)
WBC # BLD: NORMAL 10*3/UL
WBC UA: ABNORMAL /HPF (ref 0–5)
YEAST: ABNORMAL

## 2018-09-27 PROCEDURE — 96374 THER/PROPH/DIAG INJ IV PUSH: CPT

## 2018-09-27 PROCEDURE — 6360000002 HC RX W HCPCS: Performed by: EMERGENCY MEDICINE

## 2018-09-27 PROCEDURE — 96375 TX/PRO/DX INJ NEW DRUG ADDON: CPT

## 2018-09-27 PROCEDURE — 87086 URINE CULTURE/COLONY COUNT: CPT

## 2018-09-27 PROCEDURE — 99284 EMERGENCY DEPT VISIT MOD MDM: CPT

## 2018-09-27 PROCEDURE — 84703 CHORIONIC GONADOTROPIN ASSAY: CPT

## 2018-09-27 PROCEDURE — 6360000002 HC RX W HCPCS

## 2018-09-27 PROCEDURE — 74176 CT ABD & PELVIS W/O CONTRAST: CPT

## 2018-09-27 PROCEDURE — 2580000003 HC RX 258: Performed by: EMERGENCY MEDICINE

## 2018-09-27 PROCEDURE — 36415 COLL VENOUS BLD VENIPUNCTURE: CPT

## 2018-09-27 PROCEDURE — 81001 URINALYSIS AUTO W/SCOPE: CPT

## 2018-09-27 PROCEDURE — 80053 COMPREHEN METABOLIC PANEL: CPT

## 2018-09-27 PROCEDURE — 85025 COMPLETE CBC W/AUTO DIFF WBC: CPT

## 2018-09-27 PROCEDURE — 6370000000 HC RX 637 (ALT 250 FOR IP): Performed by: EMERGENCY MEDICINE

## 2018-09-27 RX ORDER — CEPHALEXIN 500 MG/1
500 CAPSULE ORAL 3 TIMES DAILY
Qty: 21 CAPSULE | Refills: 0 | Status: SHIPPED | OUTPATIENT
Start: 2018-09-27 | End: 2018-10-24 | Stop reason: ALTCHOICE

## 2018-09-27 RX ORDER — MORPHINE SULFATE 4 MG/ML
4 INJECTION, SOLUTION INTRAMUSCULAR; INTRAVENOUS ONCE
Status: COMPLETED | OUTPATIENT
Start: 2018-09-27 | End: 2018-09-27

## 2018-09-27 RX ORDER — ONDANSETRON 2 MG/ML
4 INJECTION INTRAMUSCULAR; INTRAVENOUS ONCE
Status: COMPLETED | OUTPATIENT
Start: 2018-09-27 | End: 2018-09-27

## 2018-09-27 RX ORDER — DIPHENHYDRAMINE HCL 25 MG
25 CAPSULE ORAL EVERY 6 HOURS PRN
Status: DISCONTINUED | OUTPATIENT
Start: 2018-09-27 | End: 2018-09-28 | Stop reason: HOSPADM

## 2018-09-27 RX ORDER — 0.9 % SODIUM CHLORIDE 0.9 %
1000 INTRAVENOUS SOLUTION INTRAVENOUS ONCE
Status: COMPLETED | OUTPATIENT
Start: 2018-09-27 | End: 2018-09-27

## 2018-09-27 RX ADMIN — MORPHINE SULFATE 4 MG: 4 INJECTION, SOLUTION INTRAMUSCULAR; INTRAVENOUS at 22:06

## 2018-09-27 RX ADMIN — SODIUM CHLORIDE 1000 ML: 9 INJECTION, SOLUTION INTRAVENOUS at 22:06

## 2018-09-27 RX ADMIN — ONDANSETRON 4 MG: 2 INJECTION INTRAMUSCULAR; INTRAVENOUS at 22:06

## 2018-09-27 RX ADMIN — CEFTRIAXONE 1 G: 1 INJECTION, POWDER, FOR SOLUTION INTRAMUSCULAR; INTRAVENOUS at 23:35

## 2018-09-27 ASSESSMENT — PAIN DESCRIPTION - PAIN TYPE: TYPE: ACUTE PAIN

## 2018-09-27 ASSESSMENT — PAIN - FUNCTIONAL ASSESSMENT: PAIN_FUNCTIONAL_ASSESSMENT: ADVANCED DEMENTIA

## 2018-09-27 ASSESSMENT — ENCOUNTER SYMPTOMS
RESPIRATORY NEGATIVE: 1
GASTROINTESTINAL NEGATIVE: 1
BACK PAIN: 0

## 2018-09-27 ASSESSMENT — PAIN SCALES - GENERAL
PAINLEVEL_OUTOF10: 5
PAINLEVEL_OUTOF10: 4
PAINLEVEL_OUTOF10: 6
PAINLEVEL_OUTOF10: 3

## 2018-09-27 ASSESSMENT — PAIN DESCRIPTION - LOCATION: LOCATION: ABDOMEN;PELVIS

## 2018-09-28 ENCOUNTER — HOSPITAL ENCOUNTER (EMERGENCY)
Age: 43
Discharge: HOME | End: 2018-09-28
Attending: EMERGENCY MEDICINE
Payer: COMMERCIAL

## 2018-09-28 ENCOUNTER — APPOINTMENT (OUTPATIENT)
Dept: CT IMAGING | Age: 43
End: 2018-09-28
Payer: COMMERCIAL

## 2018-09-28 ENCOUNTER — TELEPHONE (OUTPATIENT)
Dept: PRIMARY CARE CLINIC | Age: 43
End: 2018-09-28

## 2018-09-28 VITALS
OXYGEN SATURATION: 97 % | WEIGHT: 220 LBS | SYSTOLIC BLOOD PRESSURE: 130 MMHG | TEMPERATURE: 98.1 F | RESPIRATION RATE: 20 BRPM | HEIGHT: 62 IN | BODY MASS INDEX: 40.48 KG/M2 | HEART RATE: 111 BPM | DIASTOLIC BLOOD PRESSURE: 95 MMHG

## 2018-09-28 DIAGNOSIS — G44.40 DRUG-INDUCED HEADACHE, NOT ELSEWHERE CLASSIFIED, NOT INTRACTABLE: Primary | ICD-10-CM

## 2018-09-28 PROCEDURE — 96372 THER/PROPH/DIAG INJ SC/IM: CPT

## 2018-09-28 PROCEDURE — 6360000002 HC RX W HCPCS: Performed by: EMERGENCY MEDICINE

## 2018-09-28 PROCEDURE — 99284 EMERGENCY DEPT VISIT MOD MDM: CPT

## 2018-09-28 PROCEDURE — 70450 CT HEAD/BRAIN W/O DYE: CPT

## 2018-09-28 RX ORDER — PROCHLORPERAZINE 25 MG
25 SUPPOSITORY, RECTAL RECTAL EVERY 12 HOURS PRN
Qty: 60 SUPPOSITORY | Refills: 0 | Status: SHIPPED | OUTPATIENT
Start: 2018-09-28 | End: 2019-06-07

## 2018-09-28 RX ADMIN — PROCHLORPERAZINE EDISYLATE 10 MG: 5 INJECTION INTRAMUSCULAR; INTRAVENOUS at 21:52

## 2018-09-28 ASSESSMENT — ENCOUNTER SYMPTOMS
NAUSEA: 1
EYE PAIN: 0
SHORTNESS OF BREATH: 0
SINUS PAIN: 0
EYE DISCHARGE: 0
SINUS PRESSURE: 0
COUGH: 0
DIARRHEA: 0
ABDOMINAL PAIN: 0
VOMITING: 1
SORE THROAT: 0

## 2018-09-28 ASSESSMENT — PAIN SCALES - GENERAL: PAINLEVEL_OUTOF10: 7

## 2018-09-28 ASSESSMENT — PAIN DESCRIPTION - ORIENTATION: ORIENTATION: POSTERIOR

## 2018-09-28 ASSESSMENT — PAIN DESCRIPTION - LOCATION: LOCATION: HEAD

## 2018-09-28 ASSESSMENT — PAIN DESCRIPTION - PAIN TYPE: TYPE: ACUTE PAIN

## 2018-09-28 ASSESSMENT — PAIN DESCRIPTION - DESCRIPTORS: DESCRIPTORS: SQUEEZING

## 2018-09-28 NOTE — ED PROVIDER NOTES
Grandmother     Heart Disease Maternal Grandmother     High Blood Pressure Maternal Grandmother     High Cholesterol Maternal Grandmother     Mental Illness Maternal Grandmother     Obesity Maternal Grandmother     Other Maternal Grandfather           SOCIAL HISTORY       Social History     Social History    Marital status:      Spouse name: N/A    Number of children: N/A    Years of education: N/A     Social History Main Topics    Smoking status: Current Every Day Smoker     Packs/day: 1.00     Years: 25.00     Types: Cigarettes    Smokeless tobacco: Never Used    Alcohol use No      Comment: biweekly or monthly    Drug use: No    Sexual activity: Yes     Other Topics Concern    None     Social History Narrative    None       SCREENINGS    Michael Coma Scale  Eye Opening: Spontaneous  Best Verbal Response: Oriented  Best Motor Response: Obeys commands  [place] Coma Scale Score: 15        PHYSICAL EXAM    (up to 7 for level 4, 8 or more for level 5)     ED Triage Vitals [09/27/18 2014]   BP Temp Temp src Pulse Resp SpO2 Height Weight   115/76 98 °F (36.7 °C) -- 84 18 99 % -- --       Physical Exam   Constitutional: She is oriented to person, place, and time. She appears well-developed and well-nourished. HENT:   Head: Normocephalic and atraumatic. Right Ear: External ear normal.   Left Ear: External ear normal.   Nose: Nose normal.   Eyes: Pupils are equal, round, and reactive to light. Conjunctivae and EOM are normal.   Neck: Normal range of motion. Neck supple. Cardiovascular: Normal rate, regular rhythm and normal heart sounds. Pulmonary/Chest: Effort normal and breath sounds normal.   Abdominal: Soft. Bowel sounds are normal. There is tenderness. Musculoskeletal: Normal range of motion. Neurological: She is alert and oriented to person, place, and time. Skin: Skin is warm and dry.        DIAGNOSTIC RESULTS     EKG: All EKG's are interpreted by the Emergency Department

## 2018-09-29 LAB
CULTURE: NORMAL
Lab: NORMAL
SPECIMEN DESCRIPTION: NORMAL
STATUS: NORMAL

## 2018-09-29 NOTE — ED PROVIDER NOTES
[facility] ED  eMERGENCY dEPARTMENT eNCOUnter      Pt Name: David Barlow  MRN: [id number]  [place] 1975  Date of evaluation: 9/28/2018  Provider: Haris Ledesma MD    CHIEF COMPLAINT       Chief Complaint   Patient presents with    Headache     posterior head over eyes like \"rubberband. \"    Emesis     3 episodes today after taking morning meds       HISTORY OF PRESENT ILLNESS    David Barlow is a 41 y.o. female who presents to the emergency department from home complaining of headache with nausea and vomiting. The headache started at 10:00 in the morning. There is no fever or chills. The patient was seen in our emergency room yesterday for blood in the urine. She reported that the doctor told her that although she hasn't blood in the urine but her workup including a CT scan showed other than enlarged right kidney everything was within normal limits. The patient states she cannot control the vomiting. There is no abdominal pain. There is no back pain. There is no diarrhea. Triage notes and Nursing notes were reviewed by myself. Any discrepancies are addressed above. PAST MEDICAL HISTORY     Past Medical History:   Diagnosis Date    Anxiety 2007    Depression 1993    GERD (gastroesophageal reflux disease) 2010    H/O cardiovascular stress test 07/02/2018    Largerly normal myocardial perfusion imaging with soft tissue artifact but without evidence of signinfcant myocardial ischemia or infarction. EF 75%. The pt duke treadmill score is 6.    H/O echocardiogram 07/02/2018    EF >70%. The LV wallt thickness is mildly incrased.  H/O tilt table evaluation 05/24/2018    Borderline abnormal head upright tilt table study. Although the pt heart rate, blood pressure and symptoms were suggestive of orthostatic intolerance. Therefore, if clinically suspicion remains high, a trial of empiric trreatment and/or re-testing may be indicated.      Hyperlipidemia 2010    and suicidal ideas. Except as noted above the remainder of the review of systems was reviewed and is negative. PHYSICAL EXAM    (up to 7 for level 4, 8 or more for level 5)     ED Triage Vitals [09/28/18 2047]   BP Temp Temp Source Pulse Resp SpO2 Height Weight   (!) 130/95 98.1 °F (36.7 °C) Tympanic 111 20 97 % 5' 2\" (1.575 m) 220 lb (99.8 kg)       Physical Exam   Constitutional: She is oriented to person, place, and time. She appears well-developed and well-nourished. No distress. HENT:   Head: Normocephalic and atraumatic. Mouth/Throat: No oropharyngeal exudate. Eyes: Conjunctivae are normal. Right eye exhibits no discharge. Left eye exhibits no discharge. Neck: Normal range of motion. Neck supple. No tracheal deviation present. Cardiovascular: Normal rate, regular rhythm, normal heart sounds and intact distal pulses. Pulmonary/Chest: Effort normal and breath sounds normal. No stridor. No respiratory distress. She has no wheezes. She has no rales. Abdominal: Soft. She exhibits no distension. There is no tenderness. There is no rebound and no guarding. Musculoskeletal: Normal range of motion. She exhibits no edema or deformity. Neurological: She is alert and oriented to person, place, and time. No cranial nerve deficit. Skin: Skin is warm and dry. No rash noted. She is not diaphoretic. No erythema. Psychiatric: She has a normal mood and affect. Her behavior is normal.   Nursing note and vitals reviewed. DIAGNOSTIC RESULTS     EKG: (none if blank)  All EKG's are interpreted by the Emergency Department Physician who either signs or Co-signs this chart in the absence of a cardiologist.      RADIOLOGY: (none if blank)   Interpretation per the Radiologist below, if available at the time of this note:    CT Head WO Contrast   Final Result   No acute intracranial abnormality.              LABS:  Labs Reviewed - No data to display    All other labs were within normal range or not

## 2018-11-17 DIAGNOSIS — K21.9 GASTROESOPHAGEAL REFLUX DISEASE, ESOPHAGITIS PRESENCE NOT SPECIFIED: ICD-10-CM

## 2018-11-19 RX ORDER — RANITIDINE 150 MG/1
TABLET ORAL
Qty: 180 TABLET | Refills: 3 | Status: SHIPPED | OUTPATIENT
Start: 2018-11-19 | End: 2019-08-19 | Stop reason: ALTCHOICE

## 2018-11-19 NOTE — TELEPHONE ENCOUNTER
Neftali, APRN - CNP [facility]   3/25/2019 12:15 PM Deandre Guerrero MD [facility]   8/27/2019 8:20 AM Noemy Toney MD [facility]            Patient Active Problem List:     Type 2 diabetes mellitus without complication, without long-term current use of insulin (HCC)     Tobacco abuse     Single kidney     Hyperlipidemia     Syncope     PCOS (polycystic ovarian syndrome)     Gastroesophageal reflux disease     Leukocytosis     Abscess

## 2018-12-20 DIAGNOSIS — E11.9 TYPE 2 DIABETES MELLITUS WITHOUT COMPLICATION, WITHOUT LONG-TERM CURRENT USE OF INSULIN (HCC): ICD-10-CM

## 2018-12-20 RX ORDER — SITAGLIPTIN 100 MG/1
TABLET, FILM COATED ORAL
Qty: 90 TABLET | Refills: 3 | Status: SHIPPED | OUTPATIENT
Start: 2018-12-20 | End: 2020-01-17

## 2019-01-17 DIAGNOSIS — E11.9 TYPE 2 DIABETES MELLITUS WITHOUT COMPLICATION, WITHOUT LONG-TERM CURRENT USE OF INSULIN (HCC): ICD-10-CM

## 2019-01-17 RX ORDER — ASPIRIN 81 MG/1
TABLET ORAL
Qty: 90 TABLET | Refills: 3 | Status: SHIPPED | OUTPATIENT
Start: 2019-01-17 | End: 2020-02-17

## 2019-01-17 RX ORDER — LISINOPRIL 2.5 MG/1
TABLET ORAL
Qty: 90 TABLET | Refills: 3 | Status: SHIPPED | OUTPATIENT
Start: 2019-01-17 | End: 2020-02-17

## 2019-01-17 RX ORDER — ATORVASTATIN CALCIUM 40 MG/1
TABLET, FILM COATED ORAL
Qty: 90 TABLET | Refills: 3 | Status: SHIPPED | OUTPATIENT
Start: 2019-01-17 | End: 2019-08-19 | Stop reason: SDUPTHER

## 2019-06-07 ENCOUNTER — HOSPITAL ENCOUNTER (OUTPATIENT)
Age: 44
Setting detail: SPECIMEN
Discharge: HOME | End: 2019-06-07
Payer: COMMERCIAL

## 2019-06-07 ENCOUNTER — OFFICE VISIT (OUTPATIENT)
Dept: PRIMARY CARE CLINIC | Age: 44
End: 2019-06-07
Payer: COMMERCIAL

## 2019-06-07 VITALS
TEMPERATURE: 97.1 F | BODY MASS INDEX: 37.59 KG/M2 | HEART RATE: 87 BPM | DIASTOLIC BLOOD PRESSURE: 72 MMHG | SYSTOLIC BLOOD PRESSURE: 96 MMHG | RESPIRATION RATE: 18 BRPM | WEIGHT: 212.2 LBS

## 2019-06-07 DIAGNOSIS — R82.90 ABNORMAL URINE: ICD-10-CM

## 2019-06-07 DIAGNOSIS — K04.7 DENTAL INFECTION: ICD-10-CM

## 2019-06-07 DIAGNOSIS — N39.0 ACUTE UTI: Primary | ICD-10-CM

## 2019-06-07 LAB
BILIRUBIN, POC: ABNORMAL
BLOOD URINE, POC: ABNORMAL
CLARITY, POC: ABNORMAL
COLOR, POC: ABNORMAL
GLUCOSE URINE, POC: 100
KETONES, POC: ABNORMAL
LEUKOCYTE EST, POC: ABNORMAL
NITRITE, POC: ABNORMAL
PH, POC: 6.5
PROTEIN, POC: 100
SPECIFIC GRAVITY, POC: 1.03
UROBILINOGEN, POC: ABNORMAL

## 2019-06-07 PROCEDURE — 87186 SC STD MICRODIL/AGAR DIL: CPT

## 2019-06-07 PROCEDURE — 99214 OFFICE O/P EST MOD 30 MIN: CPT | Performed by: NURSE PRACTITIONER

## 2019-06-07 PROCEDURE — 87086 URINE CULTURE/COLONY COUNT: CPT

## 2019-06-07 PROCEDURE — 81003 URINALYSIS AUTO W/O SCOPE: CPT | Performed by: NURSE PRACTITIONER

## 2019-06-07 PROCEDURE — 87088 URINE BACTERIA CULTURE: CPT

## 2019-06-07 RX ORDER — FLUCONAZOLE 150 MG/1
150 TABLET ORAL ONCE
Qty: 1 TABLET | Refills: 0 | Status: SHIPPED | OUTPATIENT
Start: 2019-06-07 | End: 2019-06-07

## 2019-06-07 RX ORDER — CLINDAMYCIN HYDROCHLORIDE 150 MG/1
150 CAPSULE ORAL 3 TIMES DAILY
Qty: 30 CAPSULE | Refills: 0 | Status: SHIPPED | OUTPATIENT
Start: 2019-06-07 | End: 2019-08-02 | Stop reason: SDUPTHER

## 2019-06-07 RX ORDER — NITROFURANTOIN 25; 75 MG/1; MG/1
100 CAPSULE ORAL 2 TIMES DAILY
Qty: 10 CAPSULE | Refills: 0 | Status: SHIPPED | OUTPATIENT
Start: 2019-06-07 | End: 2019-06-17

## 2019-06-07 ASSESSMENT — ENCOUNTER SYMPTOMS
CONSTIPATION: 0
VOMITING: 0
NAUSEA: 0
TROUBLE SWALLOWING: 0
SINUS PRESSURE: 0

## 2019-06-07 NOTE — PATIENT INSTRUCTIONS
between the ice and your skin. · Take pain medicines exactly as directed. ? If the doctor gave you a prescription medicine for pain, take it as prescribed. ? If you are not taking a prescription pain medicine, ask your doctor if you can take an over-the-counter medicine. · Take your antibiotics as directed. Do not stop taking them just because you feel better. You need to take the full course of antibiotics. To prevent tooth abscess  · Brush and floss every day, and have regular dental checkups. · Eat a healthy diet, and avoid sugary foods and drinks. · Do not smoke, use e-cigarettes with nicotine, or use spit tobacco. Tobacco and nicotine slow your ability to heal. Tobacco also increases your risk for gum disease and cancer of the mouth and throat. If you need help quitting, talk to your doctor about stop-smoking programs and medicines. These can increase your chances of quitting for good. When should you call for help? Call 911 anytime you think you may need emergency care. For example, call if:    · You have trouble breathing.    Call your doctor now or seek immediate medical care if:    · You have new or worse symptoms of infection, such as:  ? Increased pain, swelling, warmth, or redness. ? Red streaks leading from the area. ? Pus draining from the area. ? A fever.    Watch closely for changes in your health, and be sure to contact your doctor if:    · You do not get better as expected. Where can you learn more? Go to https://[employer]spejesseeb.[employer]. org and sign in to your [employer] account. Enter C563 in the [facility] box to learn more about \"Abscessed Tooth: Care Instructions. \"     If you do not have an account, please click on the \"Sign Up Now\" link. Current as of: October 3, 2018  Content Version: 12.0  © 1927-3283 Healthwise, Incorporated. Care instructions adapted under license by [facility] ([facility]).  If you have questions about a medical condition or this instruction, always ask your healthcare professional. [place] [address] any warranty or liability for your use of this information. Patient Education        Urinary Tract Infection in Women: Care Instructions  Your Care Instructions    A urinary tract infection, or UTI, is a general term for an infection anywhere between the kidneys and the urethra (where urine comes out). Most UTIs are bladder infections. They often cause pain or burning when you urinate. UTIs are caused by bacteria and can be cured with antibiotics. Be sure to complete your treatment so that the infection goes away. Follow-up care is a key part of your treatment and safety. Be sure to make and go to all appointments, and call your doctor if you are having problems. It's also a good idea to know your test results and keep a list of the medicines you take. How can you care for yourself at home? · Take your antibiotics as directed. Do not stop taking them just because you feel better. You need to take the full course of antibiotics. · Drink extra water and other fluids for the next day or two. This may help wash out the bacteria that are causing the infection. (If you have kidney, heart, or liver disease and have to limit fluids, talk with your doctor before you increase your fluid intake.)  · Avoid drinks that are carbonated or have caffeine. They can irritate the bladder. · Urinate often. Try to empty your bladder each time. · To relieve pain, take a hot bath or lay a heating pad set on low over your lower belly or genital area. Never go to sleep with a heating pad in place. To prevent UTIs  · Drink plenty of water each day. This helps you urinate often, which clears bacteria from your system. (If you have kidney, heart, or liver disease and have to limit fluids, talk with your doctor before you increase your fluid intake.)  · Urinate when you need to. · Urinate right after you have sex. · Change sanitary pads often.   · Avoid

## 2019-06-07 NOTE — PROGRESS NOTES
Name: Janis No  : 1975         Chief Complaint:     Chief Complaint   Patient presents with    Urinary Tract Infection     burning and constantly urinating X 3 days    Dental Pain     right upper molar. Seeing dentist next week       History of Present Illness:      Janis No is a 41 y.o.  female who presents with Urinary Tract Infection (burning and constantly urinating X 3 days) and Dental Pain (right upper molar. Seeing dentist next week)      Kamryn Miller is here today for a walk in care visit. Dysuria    This is a new problem. The current episode started in the past 7 days. The problem occurs every urination. The problem has been gradually worsening. The quality of the pain is described as burning and aching. The pain is at a severity of 4/10. The pain is moderate. There has been no fever. Associated symptoms include frequency and urgency. Pertinent negatives include no chills, discharge, flank pain, hematuria, hesitancy, nausea, possible pregnancy, sweats or vomiting. She has tried nothing for the symptoms. The treatment provided no relief. Her past medical history is significant for recurrent UTIs. Dental Pain    This is a new problem. The current episode started 1 to 4 weeks ago. The problem occurs constantly. The problem has been gradually worsening. The pain is at a severity of 5/10. The pain is moderate. Associated symptoms include facial pain. Pertinent negatives include no difficulty swallowing, fever, oral bleeding, sinus pressure or thermal sensitivity. She has tried nothing for the symptoms. The treatment provided no relief.          Past Medical History:     Past Medical History:   Diagnosis Date    Anxiety 2007    Depression     GERD (gastroesophageal reflux disease)     H/O cardiovascular stress test 2018    Largerly normal myocardial perfusion imaging with soft tissue artifact but without evidence of signinfcant myocardial ischemia or infarction. EF 75%. The pt duke treadmill score is 6.    H/O echocardiogram 07/02/2018    EF >70%. The LV wallt thickness is mildly incrased.  H/O tilt table evaluation 05/24/2018    Borderline abnormal head upright tilt table study. Although the pt heart rate, blood pressure and symptoms were suggestive of orthostatic intolerance. Therefore, if clinically suspicion remains high, a trial of empiric trreatment and/or re-testing may be indicated.  Hyperlipidemia 2010    Hypertension 2009    Kidney dysfunction     pt only has one kidney    Obesity 1999    Type 2 diabetes mellitus without complication ([facility] [address] 3745      Reviewed all health maintenance requirements and ordered appropriate tests  There are no preventive care reminders to display for this patient. Past Surgical History:     Past Surgical History:   Procedure Laterality Date    BLADDER SUSPENSION      CARPAL TUNNEL RELEASE      CHOLECYSTECTOMY      DILATION AND CURETTAGE OF UTERUS  2007    KIDNEY REMOVAL      left        Medications:       Prior to Admission medications    Medication Sig Start Date End Date Taking?  Authorizing Provider   nitrofurantoin, macrocrystal-monohydrate, (MACROBID) 100 MG capsule Take 1 capsule by mouth 2 times daily for 10 days 6/7/19 6/17/19 Yes Radu Lindsay APRN - CNP   clindamycin (CLEOCIN) 150 MG capsule Take 1 capsule by mouth 3 times daily for 10 days 6/7/19 6/17/19 Yes Radu Lindsay APRN - CNP   fluconazole (DIFLUCAN) 150 MG tablet Take 1 tablet by mouth once for 1 dose 6/7/19 6/7/19 Yes Ramirez Lindsay APRN - CNP   atorvastatin (LIPITOR) 40 MG tablet TAKE 1 TABLET BY MOUTH EVERY DAY 1/17/19  Yes Radu Lindsay APRN - CNP   metFORMIN (GLUCOPHAGE) 1000 MG tablet TAKE 1 TABLET BY MOUTH TWICE A DAY WITH MEALS 1/17/19  Yes Radu Lindsay APRN - CNP   aspirin 81 MG EC tablet TAKE 1 TABLET BY MOUTH EVERY DAY 1/17/19  Yes Radu Lindsay APRN - CNP   lisinopril (PRINIVIL;ZESTRIL) 2.5 MG tablet TAKE 1 TABLET BY MOUTH EVERY DAY 1/17/19  Yes RONN Aguiar CNP   JANUVIA 100 MG tablet TAKE 1 TABLET BY MOUTH EVERY DAY 12/20/18  Yes RONN Aguiar CNP   ranitidine (ZANTAC) 150 MG tablet TAKE 1 TABLET BY MOUTH TWICE A DAY 11/19/18  Yes RONN Aguiar CNP   ibuprofen (ADVIL;MOTRIN) 200 MG tablet Take 800 mg by mouth every 6 hours as needed for Pain   Yes Historical Provider, MD   CRANBERRY-VITAMIN C PO Take by mouth daily    Yes Historical Provider, MD   Insulin Pen Needle (B-D ULTRAFINE III SHORT PEN) 31G X 8 MM MISC 1 each by Does not apply route daily 8/24/18  Yes RONN Aguiar CNP   metoprolol succinate (TOPROL XL) 25 MG extended release tablet Take 0.5 tablets by mouth daily 7/9/18  Yes Maikel Archer MD        Allergies:       Pcn [penicillins]    Social History:     Tobacco:    reports that she has been smoking cigarettes. She has a 25.00 pack-year smoking history. She has never used smokeless tobacco.  Alcohol:      reports that she does not drink alcohol. Drug Use:  reports that she does not use drugs. Family History:     Family History   Problem Relation Age of Onset    High Cholesterol Mother     Other Brother     Alcohol Abuse Maternal Grandmother     Cancer Maternal Grandmother     Depression Maternal Grandmother     Diabetes Maternal Grandmother     Heart Disease Maternal Grandmother     High Blood Pressure Maternal Grandmother     High Cholesterol Maternal Grandmother     Mental Illness Maternal Grandmother     Obesity Maternal Grandmother     Other Maternal Grandfather        Review of Systems:     Positive and Negative as described in HPI    Review of Systems   Constitutional: Negative for chills and fever. HENT: Positive for dental problem. Negative for mouth sores, sinus pressure and trouble swallowing. Gastrointestinal: Negative for constipation, nausea and vomiting. Genitourinary: Positive for dysuria, frequency and urgency.  Negative for difficulty urinating, flank pain, hematuria and hesitancy. Musculoskeletal: Negative for neck pain and neck stiffness. Skin: Negative for rash. Neurological: Negative for dizziness and headaches. Physical Exam:   Vitals:  BP 96/72 (Site: Left Upper Arm, Position: Sitting, Cuff Size: Large Adult)   Pulse 87   Temp 97.1 °F (36.2 °C) (Temporal)   Resp 18   Wt 212 lb 3.2 oz (96.3 kg)   BMI 37.59 kg/m²     Physical Exam   Constitutional: She appears well-developed and well-nourished. No distress. HENT:   Head:       Mouth/Throat: Oropharynx is clear and moist. No posterior oropharyngeal erythema. Eyes: Conjunctivae are normal.   Neck: Normal range of motion. Neck supple. Cardiovascular: Normal rate and regular rhythm. Pulmonary/Chest: Effort normal and breath sounds normal. She has no wheezes. Abdominal: Soft. Bowel sounds are normal. She exhibits no distension. There is no tenderness. There is no CVA tenderness. Neurological: She is alert. Skin: Skin is warm and dry. No rash noted. Nursing note and vitals reviewed. Data:     Lab Results   Component Value Date     09/27/2018    K 3.9 09/27/2018    CL 99 09/27/2018    CO2 22 09/27/2018    BUN 9 09/27/2018    CREATININE 0.59 09/27/2018    GLUCOSE 181 09/27/2018    PROT 6.7 09/27/2018    LABALBU 4.0 09/27/2018    BILITOT 0.32 09/27/2018    ALKPHOS 75 09/27/2018    AST 16 09/27/2018    ALT 19 09/27/2018     Lab Results   Component Value Date    WBC 10.7 09/27/2018    RBC 4.46 09/27/2018    HGB 14.5 09/27/2018    HCT 43.3 09/27/2018    MCV 97.1 09/27/2018    MCH 32.5 09/27/2018    MCHC 33.5 09/27/2018    RDW 12.0 09/27/2018     09/27/2018    MPV 9.9 09/27/2018     Lab Results   Component Value Date    TSH 2.33 02/15/2018     Lab Results   Component Value Date    CHOL 154 08/21/2018    HDL 28 08/21/2018    LABA1C 8.0 08/21/2018       Assessment/Plan:      Diagnosis Orders   1.  Acute UTI  nitrofurantoin, macrocrystal-monohydrate, (MACROBID) 100 MG capsule   2. Dental infection  clindamycin (CLEOCIN) 150 MG capsule   3. Abnormal urine  POCT Urinalysis No Micro (Auto)    Urine Culture    Urine Culture       1. Read Clause received counseling on the following healthy behaviors: nutrition, exercise and medication adherence  2. Patient given educational materials - see patient instructions  3. Was a self-tracking handout given in paper form or via [employer]? No  If yes, see orders or list here. 4.  Discussed use, benefit, and side effects of prescribed medications. Barriers to medication compliance addressed. All patient questions answered. Pt voiced understanding. 5.  Reviewed prior labs and health maintenance  6. Continue current medications, diet and exercise. Completed Refills   Requested Prescriptions     Signed Prescriptions Disp Refills    nitrofurantoin, macrocrystal-monohydrate, (MACROBID) 100 MG capsule 10 capsule 0     Sig: Take 1 capsule by mouth 2 times daily for 10 days    clindamycin (CLEOCIN) 150 MG capsule 30 capsule 0     Sig: Take 1 capsule by mouth 3 times daily for 10 days    fluconazole (DIFLUCAN) 150 MG tablet 1 tablet 0     Sig: Take 1 tablet by mouth once for 1 dose         Return if symptoms worsen or fail to improve.

## 2019-06-09 LAB
CULTURE: ABNORMAL
Lab: ABNORMAL
SPECIMEN DESCRIPTION: ABNORMAL

## 2019-07-24 DIAGNOSIS — E11.9 TYPE 2 DIABETES MELLITUS WITHOUT COMPLICATION, WITHOUT LONG-TERM CURRENT USE OF INSULIN (HCC): Primary | ICD-10-CM

## 2019-07-24 DIAGNOSIS — E66.9 CLASS 2 OBESITY WITH BODY MASS INDEX (BMI) OF 37.0 TO 37.9 IN ADULT, UNSPECIFIED OBESITY TYPE, UNSPECIFIED WHETHER SERIOUS COMORBIDITY PRESENT: ICD-10-CM

## 2019-08-01 ENCOUNTER — TELEPHONE (OUTPATIENT)
Dept: PRIMARY CARE CLINIC | Age: 44
End: 2019-08-01

## 2019-08-02 ENCOUNTER — OFFICE VISIT (OUTPATIENT)
Dept: PRIMARY CARE CLINIC | Age: 44
End: 2019-08-02
Payer: COMMERCIAL

## 2019-08-02 VITALS
BODY MASS INDEX: 37.5 KG/M2 | RESPIRATION RATE: 20 BRPM | DIASTOLIC BLOOD PRESSURE: 90 MMHG | TEMPERATURE: 98 F | HEART RATE: 84 BPM | SYSTOLIC BLOOD PRESSURE: 125 MMHG | WEIGHT: 211.7 LBS

## 2019-08-02 DIAGNOSIS — K04.7 DENTAL INFECTION: ICD-10-CM

## 2019-08-02 DIAGNOSIS — K08.89 PAIN, DENTAL: Primary | ICD-10-CM

## 2019-08-02 PROCEDURE — 99213 OFFICE O/P EST LOW 20 MIN: CPT | Performed by: NURSE PRACTITIONER

## 2019-08-02 RX ORDER — HYDROCODONE BITARTRATE AND ACETAMINOPHEN 5; 325 MG/1; MG/1
1 TABLET ORAL EVERY 6 HOURS PRN
Qty: 10 TABLET | Refills: 0 | Status: SHIPPED | OUTPATIENT
Start: 2019-08-02 | End: 2019-08-05

## 2019-08-02 RX ORDER — PROMETHAZINE HYDROCHLORIDE 25 MG/1
12.5 TABLET ORAL EVERY 8 HOURS PRN
Qty: 10 TABLET | Refills: 0 | Status: SHIPPED | OUTPATIENT
Start: 2019-08-02 | End: 2019-08-09

## 2019-08-02 RX ORDER — CLINDAMYCIN HYDROCHLORIDE 150 MG/1
150 CAPSULE ORAL 3 TIMES DAILY
Qty: 30 CAPSULE | Refills: 0 | Status: SHIPPED | OUTPATIENT
Start: 2019-08-02 | End: 2019-08-12

## 2019-08-02 ASSESSMENT — PATIENT HEALTH QUESTIONNAIRE - PHQ9
SUM OF ALL RESPONSES TO PHQ QUESTIONS 1-9: 0
SUM OF ALL RESPONSES TO PHQ QUESTIONS 1-9: 0
2. FEELING DOWN, DEPRESSED OR HOPELESS: 0
1. LITTLE INTEREST OR PLEASURE IN DOING THINGS: 0
SUM OF ALL RESPONSES TO PHQ9 QUESTIONS 1 & 2: 0

## 2019-08-02 ASSESSMENT — ENCOUNTER SYMPTOMS
SORE THROAT: 0
SINUS PRESSURE: 0
SHORTNESS OF BREATH: 0
NAUSEA: 0
WHEEZING: 0
COUGH: 0
VOMITING: 0
DIARRHEA: 0

## 2019-08-02 NOTE — PATIENT INSTRUCTIONS
instruction, always ask your healthcare professional. [place] [address] any warranty or liability for your use of this information.

## 2019-08-02 NOTE — PROGRESS NOTES
[facility] PHYSICIANS  [facility] PRIMARY CARE [place] [address] [address] [facility] [place]  Dept: [phone]  Dept Fax: [phone]    Alex Millard is a 45 y.o. female who presentsto the [facility] in Care today for her medical conditions/complaints as noted below. Alex Millard is c/o of Dental Pain (had 2 teeth pulled on Wednesday by Dr Eleni Black in [facility] [place] right sided jaw and ear pain) and Fever (states had fever last night and started with palpatations, states has been taking alot Ibuprofen)      HPI:     Beatrice Muñoz is here today for a walk in visit. She had 2 teeth pulled on Wednesday and yesterday she was having a lot of pain. Her pain has progressively increase and she felt like she was running a fever last night and this morning. She was not started on any antibiotics post procedure and was given ibuprofen for pain. She has been taking ibuprofen 600-800 every 4 to 6 hours for pain with minimal relief. She is also been using Orajel mouthwash with minimal relief. She contacted her dentist and is not able to get into see them till next Monday. Dental Pain    This is a new problem. Episode onset: 3 days. The problem occurs constantly. The problem has been gradually worsening. The pain is at a severity of 8/10. The pain is severe. Associated symptoms include facial pain and a fever (subjective). Pertinent negatives include no difficulty swallowing, oral bleeding, sinus pressure or thermal sensitivity. She has tried NSAIDs for the symptoms. The treatment provided mild relief. Past Medical History:   Diagnosis Date    Anxiety 2007    Depression 1993    GERD (gastroesophageal reflux disease) 2010    H/O cardiovascular stress test 07/02/2018    Largerly normal myocardial perfusion imaging with soft tissue artifact but without evidence of signinfcant myocardial ischemia or infarction. EF 75%.  The pt duke treadmill score is 6.    H/O echocardiogram 07/02/2018    EF >70%. The LV wallt thickness is mildly incrased.  H/O tilt table evaluation 05/24/2018    Borderline abnormal head upright tilt table study. Although the pt heart rate, blood pressure and symptoms were suggestive of orthostatic intolerance. Therefore, if clinically suspicion remains high, a trial of empiric trreatment and/or re-testing may be indicated.  Hyperlipidemia 2010    Hypertension 2009    Kidney dysfunction     pt only has one kidney    Obesity 1999    [facility]        Current Outpatient Medications   Medication Sig Dispense Refill    HYDROcodone-acetaminophen (NORCO) 5-325 MG per tablet Take 1 tablet by mouth every 6 hours as needed for Pain for up to 3 days. Intended supply: 3 days. Take lowest dose possible to manage pain 10 tablet 0    clindamycin (CLEOCIN) 150 MG capsule Take 1 capsule by mouth 3 times daily for 10 days 30 capsule 0    promethazine (PHENERGAN) 25 MG tablet Take 0.5 tablets by mouth every 8 hours as needed for Nausea 10 tablet 0    atorvastatin (LIPITOR) 40 MG tablet TAKE 1 TABLET BY MOUTH EVERY DAY 90 tablet 3    metFORMIN (GLUCOPHAGE) 1000 MG tablet TAKE 1 TABLET BY MOUTH TWICE A DAY WITH MEALS 180 tablet 3    aspirin 81 MG EC tablet TAKE 1 TABLET BY MOUTH EVERY DAY 90 tablet 3    lisinopril (PRINIVIL;ZESTRIL) 2.5 MG tablet TAKE 1 TABLET BY MOUTH EVERY DAY 90 tablet 3    JANUVIA 100 MG tablet TAKE 1 TABLET BY MOUTH EVERY DAY 90 tablet 3    ranitidine (ZANTAC) 150 MG tablet TAKE 1 TABLET BY MOUTH TWICE A  tablet 3    ibuprofen (ADVIL;MOTRIN) 200 MG tablet Take 800 mg by mouth every 6 hours as needed for Pain      CRANBERRY-VITAMIN C PO Take by mouth daily       metoprolol succinate (TOPROL XL) 25 MG extended release tablet Take 0.5 tablets by mouth daily 90 tablet 3     No current facility-administered medications for this visit.       Allergies   Allergen Reactions    Pcn [Penicillins] Rash       Subjective:

## 2019-08-05 RX ORDER — METOPROLOL SUCCINATE 25 MG/1
TABLET, EXTENDED RELEASE ORAL
Qty: 45 TABLET | Refills: 4 | Status: SHIPPED | OUTPATIENT
Start: 2019-08-05 | End: 2019-09-30 | Stop reason: SDUPTHER

## 2019-08-16 ENCOUNTER — TELEPHONE (OUTPATIENT)
Dept: PRIMARY CARE CLINIC | Age: 44
End: 2019-08-16

## 2019-08-19 ENCOUNTER — OFFICE VISIT (OUTPATIENT)
Dept: PRIMARY CARE CLINIC | Age: 44
End: 2019-08-19
Payer: COMMERCIAL

## 2019-08-19 VITALS
RESPIRATION RATE: 20 BRPM | DIASTOLIC BLOOD PRESSURE: 82 MMHG | TEMPERATURE: 97.8 F | WEIGHT: 208.1 LBS | SYSTOLIC BLOOD PRESSURE: 125 MMHG | OXYGEN SATURATION: 98 % | BODY MASS INDEX: 36.86 KG/M2 | HEART RATE: 84 BPM

## 2019-08-19 DIAGNOSIS — E66.9 CLASS 2 OBESITY WITH BODY MASS INDEX (BMI) OF 37.0 TO 37.9 IN ADULT, UNSPECIFIED OBESITY TYPE, UNSPECIFIED WHETHER SERIOUS COMORBIDITY PRESENT: ICD-10-CM

## 2019-08-19 DIAGNOSIS — Z72.0 TOBACCO ABUSE: ICD-10-CM

## 2019-08-19 DIAGNOSIS — K21.9 GASTROESOPHAGEAL REFLUX DISEASE, ESOPHAGITIS PRESENCE NOT SPECIFIED: ICD-10-CM

## 2019-08-19 DIAGNOSIS — F34.1 DYSTHYMIA: ICD-10-CM

## 2019-08-19 DIAGNOSIS — E11.9 TYPE 2 DIABETES MELLITUS WITHOUT COMPLICATION, WITHOUT LONG-TERM CURRENT USE OF INSULIN (HCC): Primary | ICD-10-CM

## 2019-08-19 PROBLEM — E66.812 CLASS 2 OBESITY WITH BODY MASS INDEX (BMI) OF 37.0 TO 37.9 IN ADULT: Status: ACTIVE | Noted: 2019-08-19

## 2019-08-19 LAB — HBA1C MFR BLD: 7.4 %

## 2019-08-19 PROCEDURE — 83036 HEMOGLOBIN GLYCOSYLATED A1C: CPT | Performed by: NURSE PRACTITIONER

## 2019-08-19 PROCEDURE — 99214 OFFICE O/P EST MOD 30 MIN: CPT | Performed by: NURSE PRACTITIONER

## 2019-08-19 RX ORDER — GLIPIZIDE 2.5 MG/1
2.5 TABLET, EXTENDED RELEASE ORAL DAILY
Qty: 30 TABLET | Refills: 3 | Status: SHIPPED | OUTPATIENT
Start: 2019-08-19 | End: 2019-09-30 | Stop reason: ALTCHOICE

## 2019-08-19 RX ORDER — ESCITALOPRAM OXALATE 10 MG/1
10 TABLET ORAL DAILY
Qty: 60 TABLET | Refills: 0 | Status: SHIPPED | OUTPATIENT
Start: 2019-08-19 | End: 2019-09-30 | Stop reason: ALTCHOICE

## 2019-08-19 RX ORDER — OMEPRAZOLE 20 MG/1
20 CAPSULE, DELAYED RELEASE ORAL DAILY
Qty: 30 CAPSULE | Refills: 0 | Status: SHIPPED | OUTPATIENT
Start: 2019-08-19 | End: 2019-09-20 | Stop reason: SDUPTHER

## 2019-08-19 RX ORDER — HYDROXYZINE HYDROCHLORIDE 25 MG/1
25 TABLET, FILM COATED ORAL EVERY 8 HOURS PRN
Qty: 30 TABLET | Refills: 0 | Status: SHIPPED | OUTPATIENT
Start: 2019-08-19 | End: 2019-09-20 | Stop reason: SDUPTHER

## 2019-08-19 RX ORDER — ATORVASTATIN CALCIUM 40 MG/1
40 TABLET, FILM COATED ORAL DAILY
Qty: 90 TABLET | Refills: 3 | Status: SHIPPED | OUTPATIENT
Start: 2019-08-19 | End: 2020-08-17 | Stop reason: SDUPTHER

## 2019-08-19 ASSESSMENT — ENCOUNTER SYMPTOMS
SINUS PAIN: 0
TROUBLE SWALLOWING: 0
VISUAL CHANGE: 0
ABDOMINAL PAIN: 0
NAUSEA: 0
SINUS PRESSURE: 0
BLURRED VISION: 0
CONSTIPATION: 0
VOMITING: 0
SORE THROAT: 0
RHINORRHEA: 0
SHORTNESS OF BREATH: 0
COUGH: 0
DIARRHEA: 0
PHOTOPHOBIA: 0
WHEEZING: 0
ABDOMINAL DISTENTION: 0

## 2019-08-19 ASSESSMENT — PATIENT HEALTH QUESTIONNAIRE - PHQ9
SUM OF ALL RESPONSES TO PHQ QUESTIONS 1-9: 1
1. LITTLE INTEREST OR PLEASURE IN DOING THINGS: 0
SUM OF ALL RESPONSES TO PHQ QUESTIONS 1-9: 1
2. FEELING DOWN, DEPRESSED OR HOPELESS: 1
SUM OF ALL RESPONSES TO PHQ9 QUESTIONS 1 & 2: 1

## 2019-08-19 NOTE — PROGRESS NOTES
blurred vision, photophobia and visual disturbance. Respiratory: Negative for cough, shortness of breath and wheezing. Cardiovascular: Negative for chest pain and palpitations. Gastrointestinal: Negative for abdominal distention, abdominal pain, constipation, diarrhea, nausea and vomiting. Heartburn   Endocrine: Negative for polydipsia, polyphagia and polyuria. Genitourinary: Negative for difficulty urinating, dyspareunia, genital sores, hematuria and urgency. Musculoskeletal: Negative for gait problem, joint swelling, neck pain and neck stiffness. Skin: Negative for rash and wound. Neurological: Negative for dizziness, syncope, light-headedness and numbness. Hematological: Does not bruise/bleed easily. Psychiatric/Behavioral: Positive for dysphoric mood. Negative for agitation, decreased concentration and sleep disturbance. The patient is nervous/anxious. Physical Exam:   Vitals:  /82 (Site: Left Upper Arm, Position: Sitting, Cuff Size: Large Adult)   Pulse 84   Temp 97.8 °F (36.6 °C) (Temporal)   Resp 20   Wt 208 lb 1.6 oz (94.4 kg)   SpO2 98%   BMI 36.86 kg/m²     Physical Exam   Constitutional: She is oriented to person, place, and time. She appears well-developed and well-nourished. Non-toxic appearance. She does not appear ill. No distress. HENT:   Head: Normocephalic and atraumatic. Eyes: Pupils are equal, round, and reactive to light. Conjunctivae are normal. Right eye exhibits no discharge. Left eye exhibits no discharge. Neck: Normal range of motion. Neck supple. Cardiovascular: Normal rate, regular rhythm and normal heart sounds. No murmur heard. Pulmonary/Chest: Effort normal and breath sounds normal. No respiratory distress. She has no wheezes. Abdominal: Soft. Bowel sounds are normal. She exhibits no mass. There is no tenderness. There is no guarding. No hernia. Obese abdomen   Musculoskeletal: She exhibits no edema or tenderness.

## 2019-08-19 NOTE — PATIENT INSTRUCTIONS
grams.  For foods that don't come with labels, such as fresh fruits and vegetables, you'll need a guide that lists carbohydrate in these foods. Ask your doctor, dietitian, or diabetes educator about books or other nutrition guides you can use. If you take insulin, you need to know how many grams of carbohydrate are in a meal. This lets you know how much rapid-acting insulin to take before you eat. If you use an insulin pump, you get a constant rate of insulin during the day. So the pump must be programmed at meals to give you extra insulin to cover the rise in blood sugar after meals. When you know how much carbohydrate you will eat, you can take the right amount of insulin. Or, if you always use the same amount of insulin, you need to make sure that you eat the same amount of carbohydrate at meals. If you need more help to understand carbohydrate counting and food labels, ask your doctor, dietitian, or diabetes educator. How do you get started with meal planning? Here are some tips to get started:  · Plan your meals a week at a time. Don't forget to include snacks too. · Use cookbooks or online recipes to plan several main meals. Plan some quick meals for busy nights. You also can double some recipes that freeze well. Then you can save half for other busy nights when you don't have time to cook. · Make sure you have the ingredients you need for your recipes. If you're running low on basic items, put these items on your shopping list too. · List foods that you use to make breakfasts, lunches, and snacks. List plenty of fruits and vegetables. · Post this list on the refrigerator. Add to it as you think of more things you need. · Take the list to the store to do your weekly shopping. Follow-up care is a key part of your treatment and safety. Be sure to make and go to all appointments, and call your doctor if you are having problems.  It's also a good idea to know your test results and keep a list of the liability for your use of this information. Patient Education        Learning About Diabetes Food Guidelines  Your Care Instructions    Meal planning is important to manage diabetes. It helps keep your blood sugar at a target level (which you set with your doctor). You don't have to eat special foods. You can eat what your family eats, including sweets once in a while. But you do have to pay attention to how often you eat and how much you eat of certain foods. You may want to work with a dietitian or a certified diabetes educator (CDE) to help you plan meals and snacks. A dietitian or CDE can also help you lose weight if that is one of your goals. What should you know about eating carbs? Managing the amount of carbohydrate (carbs) you eat is an important part of healthy meals when you have diabetes. Carbohydrate is found in many foods. · Learn which foods have carbs. And learn the amounts of carbs in different foods. ? Bread, cereal, pasta, and rice have about 15 grams of carbs in a serving. A serving is 1 slice of bread (1 ounce), ½ cup of cooked cereal, or 1/3 cup of cooked pasta or rice. ? Fruits have 15 grams of carbs in a serving. A serving is 1 small fresh fruit, such as an apple or orange; ½ of a banana; ½ cup of cooked or canned fruit; ½ cup of fruit juice; 1 cup of melon or raspberries; or 2 tablespoons of dried fruit. ? Milk and no-sugar-added yogurt have 15 grams of carbs in a serving. A serving is 1 cup of milk or 2/3 cup of no-sugar-added yogurt. ? Starchy vegetables have 15 grams of carbs in a serving. A serving is ½ cup of mashed potatoes or sweet potato; 1 cup winter squash; ½ of a small baked potato; ½ cup of cooked beans; or ½ cup cooked corn or green peas. · Learn how much carbs to eat each day and at each meal. A dietitian or CDE can teach you how to keep track of the amount of carbs you eat. This is called carbohydrate counting.   · If you are not sure how to count carbohydrate grams, use the Plate Method to plan meals. It is a good, quick way to make sure that you have a balanced meal. It also helps you spread carbs throughout the day. ? Divide your plate by types of foods. Put non-starchy vegetables on half the plate, meat or other protein food on one-quarter of the plate, and a grain or starchy vegetable in the final quarter of the plate. To this you can add a small piece of fruit and 1 cup of milk or yogurt, depending on how many carbs you are supposed to eat at a meal.  · Try to eat about the same amount of carbs at each meal. Do not \"save up\" your daily allowance of carbs to eat at one meal.  · Proteins have very little or no carbs per serving. Examples of proteins are beef, chicken, turkey, fish, eggs, tofu, cheese, cottage cheese, and peanut butter. A serving size of meat is 3 ounces, which is about the size of a deck of cards. Examples of meat substitute serving sizes (equal to 1 ounce of meat) are 1/4 cup of cottage cheese, 1 egg, 1 tablespoon of peanut butter, and ½ cup of tofu. How can you eat out and still eat healthy? · Learn to estimate the serving sizes of foods that have carbohydrate. If you measure food at home, it will be easier to estimate the amount in a serving of restaurant food. · If the meal you order has too much carbohydrate (such as potatoes, corn, or baked beans), ask to have a low-carbohydrate food instead. Ask for a salad or green vegetables. · If you use insulin, check your blood sugar before and after eating out to help you plan how much to eat in the future. · If you eat more carbohydrate at a meal than you had planned, take a walk or do other exercise. This will help lower your blood sugar. What else should you know? · Limit saturated fat, such as the fat from meat and dairy products. This is a healthy choice because people who have diabetes are at higher risk of heart disease. So choose lean cuts of meat and nonfat or low-fat dairy products.  Use

## 2019-09-09 ENCOUNTER — TELEPHONE (OUTPATIENT)
Dept: PRIMARY CARE CLINIC | Age: 44
End: 2019-09-09

## 2019-09-20 RX ORDER — OMEPRAZOLE 20 MG/1
CAPSULE, DELAYED RELEASE ORAL
Qty: 30 CAPSULE | Refills: 0 | Status: SHIPPED | OUTPATIENT
Start: 2019-09-20 | End: 2019-09-30

## 2019-09-20 RX ORDER — HYDROXYZINE HYDROCHLORIDE 25 MG/1
25 TABLET, FILM COATED ORAL EVERY 8 HOURS PRN
Qty: 30 TABLET | Refills: 0 | Status: SHIPPED | OUTPATIENT
Start: 2019-09-20 | End: 2019-10-20

## 2019-09-26 ENCOUNTER — HOSPITAL ENCOUNTER (EMERGENCY)
Age: 44
Discharge: HOME | End: 2019-09-26
Attending: EMERGENCY MEDICINE
Payer: COMMERCIAL

## 2019-09-26 ENCOUNTER — APPOINTMENT (OUTPATIENT)
Dept: GENERAL RADIOLOGY | Age: 44
End: 2019-09-26
Payer: COMMERCIAL

## 2019-09-26 VITALS
HEART RATE: 87 BPM | OXYGEN SATURATION: 95 % | DIASTOLIC BLOOD PRESSURE: 103 MMHG | RESPIRATION RATE: 18 BRPM | TEMPERATURE: 97.1 F | SYSTOLIC BLOOD PRESSURE: 134 MMHG

## 2019-09-26 DIAGNOSIS — R53.83 FATIGUE, UNSPECIFIED TYPE: Primary | ICD-10-CM

## 2019-09-26 DIAGNOSIS — D72.829 LEUKOCYTOSIS, UNSPECIFIED TYPE: ICD-10-CM

## 2019-09-26 LAB
-: ABNORMAL
ABSOLUTE EOS #: 0.46 K/UL (ref 0–0.44)
ABSOLUTE IMMATURE GRANULOCYTE: 0.08 K/UL (ref 0–0.3)
ABSOLUTE LYMPH #: 4.55 K/UL (ref 1.1–3.7)
ABSOLUTE MONO #: 0.69 K/UL (ref 0.1–1.2)
ALBUMIN SERPL-MCNC: 4.4 G/DL (ref 3.5–5.2)
ALBUMIN/GLOBULIN RATIO: 1.4 (ref 1–2.5)
ALP BLD-CCNC: 76 U/L (ref 35–104)
ALT SERPL-CCNC: 28 U/L (ref 5–33)
AMORPHOUS: ABNORMAL
ANION GAP SERPL CALCULATED.3IONS-SCNC: 16 MMOL/L (ref 9–17)
AST SERPL-CCNC: 20 U/L
BACTERIA: ABNORMAL
BASOPHILS # BLD: 0 % (ref 0–2)
BASOPHILS ABSOLUTE: 0.04 K/UL (ref 0–0.2)
BILIRUB SERPL-MCNC: 0.28 MG/DL (ref 0.3–1.2)
BILIRUBIN URINE: NEGATIVE
BUN BLDV-MCNC: 9 MG/DL (ref 6–20)
BUN/CREAT BLD: 13 (ref 9–20)
CALCIUM SERPL-MCNC: 9.5 MG/DL (ref 8.6–10.4)
CASTS UA: ABNORMAL /LPF
CHLORIDE BLD-SCNC: 99 MMOL/L (ref 98–107)
CO2: 22 MMOL/L (ref 20–31)
COLOR: YELLOW
COMMENT UA: ABNORMAL
CREAT SERPL-MCNC: 0.69 MG/DL (ref 0.5–0.9)
CRYSTALS, UA: ABNORMAL /HPF
DIFFERENTIAL TYPE: ABNORMAL
EOSINOPHILS RELATIVE PERCENT: 4 % (ref 1–4)
EPITHELIAL CELLS UA: ABNORMAL /HPF (ref 0–25)
GFR AFRICAN AMERICAN: >60 ML/MIN
GFR NON-AFRICAN AMERICAN: >60 ML/MIN
GFR SERPL CREATININE-BSD FRML MDRD: ABNORMAL ML/MIN/{1.73_M2}
GFR SERPL CREATININE-BSD FRML MDRD: ABNORMAL ML/MIN/{1.73_M2}
GLUCOSE BLD-MCNC: 127 MG/DL (ref 70–99)
GLUCOSE URINE: NEGATIVE
HCT VFR BLD CALC: 45.4 % (ref 36.3–47.1)
HEMOGLOBIN: 15.2 G/DL (ref 11.9–15.1)
IMMATURE GRANULOCYTES: 1 %
KETONES, URINE: NEGATIVE
LEUKOCYTE ESTERASE, URINE: NEGATIVE
LIPASE: 38 U/L (ref 13–60)
LYMPHOCYTES # BLD: 37 % (ref 24–43)
MCH RBC QN AUTO: 32.7 PG (ref 25.2–33.5)
MCHC RBC AUTO-ENTMCNC: 33.5 G/DL (ref 28.4–34.8)
MCV RBC AUTO: 97.6 FL (ref 82.6–102.9)
MONOCYTES # BLD: 6 % (ref 3–12)
MUCUS: ABNORMAL
NITRITE, URINE: NEGATIVE
NRBC AUTOMATED: 0 PER 100 WBC
OTHER OBSERVATIONS UA: ABNORMAL
PDW BLD-RTO: 12.3 % (ref 11.8–14.4)
PH UA: 6.5 (ref 5–9)
PLATELET # BLD: 308 K/UL (ref 138–453)
PLATELET ESTIMATE: ABNORMAL
PMV BLD AUTO: 9.8 FL (ref 8.1–13.5)
POTASSIUM SERPL-SCNC: 3.9 MMOL/L (ref 3.7–5.3)
PROTEIN UA: NEGATIVE
RBC # BLD: 4.65 M/UL (ref 3.95–5.11)
RBC # BLD: ABNORMAL 10*6/UL
RBC UA: ABNORMAL /HPF (ref 0–2)
RENAL EPITHELIAL, UA: ABNORMAL /HPF
SEG NEUTROPHILS: 52 % (ref 36–65)
SEGMENTED NEUTROPHILS ABSOLUTE COUNT: 6.33 K/UL (ref 1.5–8.1)
SODIUM BLD-SCNC: 137 MMOL/L (ref 135–144)
SPECIFIC GRAVITY UA: <1.005 (ref 1.01–1.02)
TOTAL PROTEIN: 7.6 G/DL (ref 6.4–8.3)
TRICHOMONAS: ABNORMAL
TROPONIN INTERP: NORMAL
TROPONIN T: <0.03 NG/ML
TROPONIN, HIGH SENSITIVITY: NORMAL NG/L (ref 0–14)
TSH SERPL DL<=0.05 MIU/L-ACNC: 4.1 MIU/L (ref 0.3–5)
TURBIDITY: CLEAR
URINE HGB: NEGATIVE
UROBILINOGEN, URINE: NORMAL
WBC # BLD: 12.2 K/UL (ref 3.5–11.3)
WBC # BLD: ABNORMAL 10*3/UL
WBC UA: ABNORMAL /HPF (ref 0–5)
YEAST: ABNORMAL

## 2019-09-26 PROCEDURE — 81001 URINALYSIS AUTO W/SCOPE: CPT

## 2019-09-26 PROCEDURE — 84484 ASSAY OF TROPONIN QUANT: CPT

## 2019-09-26 PROCEDURE — 99284 EMERGENCY DEPT VISIT MOD MDM: CPT

## 2019-09-26 PROCEDURE — 84443 ASSAY THYROID STIM HORMONE: CPT

## 2019-09-26 PROCEDURE — 85025 COMPLETE CBC W/AUTO DIFF WBC: CPT

## 2019-09-26 PROCEDURE — 80053 COMPREHEN METABOLIC PANEL: CPT

## 2019-09-26 PROCEDURE — 71046 X-RAY EXAM CHEST 2 VIEWS: CPT

## 2019-09-26 PROCEDURE — 93005 ELECTROCARDIOGRAM TRACING: CPT | Performed by: EMERGENCY MEDICINE

## 2019-09-26 PROCEDURE — 83690 ASSAY OF LIPASE: CPT

## 2019-09-26 RX ORDER — FLUCONAZOLE 150 MG/1
TABLET ORAL
Qty: 3 TABLET | Refills: 0 | Status: SHIPPED | OUTPATIENT
Start: 2019-09-26 | End: 2019-09-30

## 2019-09-26 ASSESSMENT — ENCOUNTER SYMPTOMS
VOMITING: 0
COUGH: 0
COLOR CHANGE: 0
NAUSEA: 1
ABDOMINAL PAIN: 0
SHORTNESS OF BREATH: 0
DIARRHEA: 0
SORE THROAT: 0

## 2019-09-27 ENCOUNTER — TELEPHONE (OUTPATIENT)
Dept: PRIMARY CARE CLINIC | Age: 44
End: 2019-09-27

## 2019-09-27 LAB
EKG ATRIAL RATE: 74 BPM
EKG P AXIS: 50 DEGREES
EKG P-R INTERVAL: 166 MS
EKG Q-T INTERVAL: 404 MS
EKG QRS DURATION: 88 MS
EKG QTC CALCULATION (BAZETT): 448 MS
EKG R AXIS: 38 DEGREES
EKG T AXIS: 38 DEGREES
EKG VENTRICULAR RATE: 74 BPM

## 2019-09-27 PROCEDURE — 93010 ELECTROCARDIOGRAM REPORT: CPT | Performed by: INTERNAL MEDICINE

## 2019-09-27 NOTE — TELEPHONE ENCOUNTER
[facility] Transitions Initial Follow Up Call    Outreach made within 2 business days of discharge: Yes    Patient: Loyd Cross Patient : 1975   MRN: [id number]  Reason for Admission: There are no discharge diagnoses documented for the most recent discharge. Discharge Date: 19     L/m for pt to call for follow up 11:48 am 19  L/m for pt to return call at 2:47 pm   Spoke with:     Discharge department/facility:     TCM Interactive Patient Contact:  Was patient able to fill all prescriptions:   Was patient instructed to bring all medications to the follow-up visit:   Is patient taking all medications as directed in the discharge summary?    Does patient understand their discharge instructions:   Does patient have questions or concerns that need addressed prior to 7-14 day follow up office visit:     Scheduled appointment with PCP within 7-14 days    Follow Up  Future Appointments   Date Time Provider Daryl Guillaume   2019 10:30 AM Kye Hart, APRN - CNP [facility]       Deborah Huerta

## 2019-09-27 NOTE — ED PROVIDER NOTES
09/26/19 2208 09/26/19 2223   BP: (!) 134/103    Pulse: 87    Resp: 18    Temp: 97.1 °F (36.2 °C)    SpO2: 97% 95%       Medications - No data to display    MDM. Patient arrived to the ER in no acute distress with a normal neurologic exam.  With complaint of generalized fatigue there was concern that this could be cardiac blood volume or infectious process. A work-up was obtained that showed no clinically significant findings. There was mention of yeast in the urine but I doubt this is the cause of her symptoms. At this time should be placed on Diflucan secondary to this finding but as there is no focal finding on laboratory studies and exam is overall negative she can be discharged home with outpatient follow-up    REVAL:         CRITICAL CARE TIME   Total Critical Care time was minutes, excluding separately reportable procedures. There was a high probability of clinically significant/life threatening deterioration in the patient's condition which required my urgent intervention. CONSULTS:  None    PROCEDURES:  Unless otherwise noted below, none     Procedures    FINAL IMPRESSION      1. Fatigue, unspecified type    2. Leukocytosis, unspecified type          DISPOSITION/PLAN   DISPOSITION Decision To Discharge 09/26/2019 11:39:08 PM      PATIENT REFERRED TO:  RONN Lopez - [address]  [phone]    Schedule an appointment as soon as possible for a visit in 5 days  For repeat evaluation      DISCHARGE MEDICATIONS:  New Prescriptions    FLUCONAZOLE (DIFLUCAN) 150 MG TABLET    1 pill by mouth every 3 days for 3 doses          (Please note:  Portions of this note were completed with a voice recognition program.  Efforts were made to edit the dictations but occasionally words and phrases are mis-transcribed.)  Form v2016. J.[id number]    Raudel Cleary DO (electronically signed)  Emergency Medicine Provider        DO Jesus  09/26/19 2267

## 2019-09-28 ENCOUNTER — HOSPITAL ENCOUNTER (OUTPATIENT)
Age: 44
Discharge: HOME | End: 2019-09-28
Payer: COMMERCIAL

## 2019-09-28 DIAGNOSIS — E11.9 TYPE 2 DIABETES MELLITUS WITHOUT COMPLICATION, WITHOUT LONG-TERM CURRENT USE OF INSULIN (HCC): ICD-10-CM

## 2019-09-28 DIAGNOSIS — E66.9 CLASS 2 OBESITY WITH BODY MASS INDEX (BMI) OF 37.0 TO 37.9 IN ADULT, UNSPECIFIED OBESITY TYPE, UNSPECIFIED WHETHER SERIOUS COMORBIDITY PRESENT: ICD-10-CM

## 2019-09-28 DIAGNOSIS — R82.90 ABNORMAL URINE: ICD-10-CM

## 2019-09-28 LAB
ALT SERPL-CCNC: 28 U/L (ref 5–33)
ANION GAP SERPL CALCULATED.3IONS-SCNC: 16 MMOL/L (ref 9–17)
AST SERPL-CCNC: 23 U/L
BUN BLDV-MCNC: 12 MG/DL (ref 6–20)
BUN/CREAT BLD: 18 (ref 9–20)
CALCIUM SERPL-MCNC: 9.8 MG/DL (ref 8.6–10.4)
CHLORIDE BLD-SCNC: 94 MMOL/L (ref 98–107)
CHOLESTEROL/HDL RATIO: 6.3
CHOLESTEROL: 182 MG/DL
CO2: 22 MMOL/L (ref 20–31)
CREAT SERPL-MCNC: 0.67 MG/DL (ref 0.5–0.9)
CREATININE URINE: 102.5 MG/DL (ref 28–217)
ESTIMATED AVERAGE GLUCOSE: 154 MG/DL
GFR AFRICAN AMERICAN: >60 ML/MIN
GFR NON-AFRICAN AMERICAN: >60 ML/MIN
GFR SERPL CREATININE-BSD FRML MDRD: ABNORMAL ML/MIN/{1.73_M2}
GFR SERPL CREATININE-BSD FRML MDRD: ABNORMAL ML/MIN/{1.73_M2}
GLUCOSE BLD-MCNC: 125 MG/DL (ref 70–99)
HBA1C MFR BLD: 7 % (ref 4.8–5.9)
HCT VFR BLD CALC: 45.4 % (ref 36.3–47.1)
HDLC SERPL-MCNC: 29 MG/DL
HEMOGLOBIN: 15.2 G/DL (ref 11.9–15.1)
LDL CHOLESTEROL: 85 MG/DL (ref 0–130)
MCH RBC QN AUTO: 32.6 PG (ref 25.2–33.5)
MCHC RBC AUTO-ENTMCNC: 33.5 G/DL (ref 28.4–34.8)
MCV RBC AUTO: 97.4 FL (ref 82.6–102.9)
MICROALBUMIN/CREAT 24H UR: <12 MG/L
MICROALBUMIN/CREAT UR-RTO: NORMAL MCG/MG CREAT
NRBC AUTOMATED: 0 PER 100 WBC
PDW BLD-RTO: 12.4 % (ref 11.8–14.4)
PLATELET # BLD: 311 K/UL (ref 138–453)
PMV BLD AUTO: 9.9 FL (ref 8.1–13.5)
POTASSIUM SERPL-SCNC: 4.2 MMOL/L (ref 3.7–5.3)
RBC # BLD: 4.66 M/UL (ref 3.95–5.11)
SODIUM BLD-SCNC: 132 MMOL/L (ref 135–144)
TRIGL SERPL-MCNC: 340 MG/DL
VLDLC SERPL CALC-MCNC: ABNORMAL MG/DL (ref 1–30)
WBC # BLD: 11.6 K/UL (ref 3.5–11.3)

## 2019-09-28 PROCEDURE — 82570 ASSAY OF URINE CREATININE: CPT

## 2019-09-28 PROCEDURE — 84450 TRANSFERASE (AST) (SGOT): CPT

## 2019-09-28 PROCEDURE — 80048 BASIC METABOLIC PNL TOTAL CA: CPT

## 2019-09-28 PROCEDURE — 85027 COMPLETE CBC AUTOMATED: CPT

## 2019-09-28 PROCEDURE — 36415 COLL VENOUS BLD VENIPUNCTURE: CPT

## 2019-09-28 PROCEDURE — 82043 UR ALBUMIN QUANTITATIVE: CPT

## 2019-09-28 PROCEDURE — 84460 ALANINE AMINO (ALT) (SGPT): CPT

## 2019-09-28 PROCEDURE — 83036 HEMOGLOBIN GLYCOSYLATED A1C: CPT

## 2019-09-28 PROCEDURE — 80061 LIPID PANEL: CPT

## 2019-09-30 ENCOUNTER — OFFICE VISIT (OUTPATIENT)
Dept: PRIMARY CARE CLINIC | Age: 44
End: 2019-09-30
Payer: COMMERCIAL

## 2019-09-30 VITALS
WEIGHT: 205.3 LBS | SYSTOLIC BLOOD PRESSURE: 110 MMHG | RESPIRATION RATE: 18 BRPM | OXYGEN SATURATION: 98 % | TEMPERATURE: 97.8 F | DIASTOLIC BLOOD PRESSURE: 76 MMHG | HEART RATE: 88 BPM | BODY MASS INDEX: 36.37 KG/M2

## 2019-09-30 DIAGNOSIS — E11.9 TYPE 2 DIABETES MELLITUS WITHOUT COMPLICATION, WITHOUT LONG-TERM CURRENT USE OF INSULIN (HCC): Primary | ICD-10-CM

## 2019-09-30 DIAGNOSIS — Z23 NEED FOR INFLUENZA VACCINATION: ICD-10-CM

## 2019-09-30 DIAGNOSIS — K21.9 GASTROESOPHAGEAL REFLUX DISEASE, ESOPHAGITIS PRESENCE NOT SPECIFIED: ICD-10-CM

## 2019-09-30 DIAGNOSIS — F34.1 DYSTHYMIA: ICD-10-CM

## 2019-09-30 PROCEDURE — 99214 OFFICE O/P EST MOD 30 MIN: CPT | Performed by: NURSE PRACTITIONER

## 2019-09-30 PROCEDURE — 90471 IMMUNIZATION ADMIN: CPT | Performed by: NURSE PRACTITIONER

## 2019-09-30 PROCEDURE — 90686 IIV4 VACC NO PRSV 0.5 ML IM: CPT | Performed by: NURSE PRACTITIONER

## 2019-09-30 RX ORDER — GLIMEPIRIDE 1 MG/1
1 TABLET ORAL
Qty: 90 TABLET | Refills: 1 | Status: SHIPPED | OUTPATIENT
Start: 2019-09-30 | End: 2020-04-27

## 2019-09-30 RX ORDER — OMEPRAZOLE 40 MG/1
40 CAPSULE, DELAYED RELEASE ORAL DAILY
Qty: 90 CAPSULE | Refills: 0 | Status: SHIPPED | OUTPATIENT
Start: 2019-09-30 | End: 2019-12-12 | Stop reason: SDUPTHER

## 2019-09-30 ASSESSMENT — ENCOUNTER SYMPTOMS
SINUS PRESSURE: 0
NAUSEA: 0
COUGH: 0
GLOBUS SENSATION: 0
DIARRHEA: 0
RHINORRHEA: 0
SINUS PAIN: 0
VOMITING: 0
SORE THROAT: 0
CONSTIPATION: 0
ABDOMINAL PAIN: 0
SHORTNESS OF BREATH: 0
WHEEZING: 0
HEARTBURN: 1
ABDOMINAL DISTENTION: 0
PHOTOPHOBIA: 0

## 2019-10-01 ENCOUNTER — TELEPHONE (OUTPATIENT)
Dept: PRIMARY CARE CLINIC | Age: 44
End: 2019-10-01

## 2019-10-01 RX ORDER — METOPROLOL SUCCINATE 25 MG/1
TABLET, EXTENDED RELEASE ORAL
Qty: 15 TABLET | Refills: 14 | Status: SHIPPED | OUTPATIENT
Start: 2019-10-01 | End: 2021-02-17 | Stop reason: SDUPTHER

## 2019-10-04 ASSESSMENT — ENCOUNTER SYMPTOMS: ROS SKIN COMMENTS: ITCHING

## 2019-12-02 ENCOUNTER — OFFICE VISIT (OUTPATIENT)
Dept: PRIMARY CARE CLINIC | Age: 44
End: 2019-12-02
Payer: COMMERCIAL

## 2019-12-02 VITALS
HEART RATE: 91 BPM | BODY MASS INDEX: 37.17 KG/M2 | WEIGHT: 202 LBS | TEMPERATURE: 97.7 F | DIASTOLIC BLOOD PRESSURE: 79 MMHG | HEIGHT: 62 IN | SYSTOLIC BLOOD PRESSURE: 118 MMHG

## 2019-12-02 DIAGNOSIS — J45.901 ALLERGIC BRONCHITIS WITH ACUTE EXACERBATION: Primary | ICD-10-CM

## 2019-12-02 PROCEDURE — 99213 OFFICE O/P EST LOW 20 MIN: CPT | Performed by: NURSE PRACTITIONER

## 2019-12-02 RX ORDER — PREDNISONE 20 MG/1
40 TABLET ORAL DAILY
Qty: 10 TABLET | Refills: 0 | Status: SHIPPED | OUTPATIENT
Start: 2019-12-02 | End: 2019-12-07

## 2019-12-02 RX ORDER — FEXOFENADINE HCL 180 MG/1
180 TABLET ORAL DAILY
Qty: 14 TABLET | Refills: 0 | Status: SHIPPED | OUTPATIENT
Start: 2019-12-02 | End: 2019-12-16

## 2019-12-02 RX ORDER — ALBUTEROL SULFATE 90 UG/1
2 AEROSOL, METERED RESPIRATORY (INHALATION) 4 TIMES DAILY PRN
Qty: 1 INHALER | Refills: 0 | Status: SHIPPED | OUTPATIENT
Start: 2019-12-02 | End: 2019-12-31 | Stop reason: SDUPTHER

## 2019-12-02 RX ORDER — BENZONATATE 200 MG/1
200 CAPSULE ORAL 3 TIMES DAILY PRN
Qty: 21 CAPSULE | Refills: 0 | Status: SHIPPED | OUTPATIENT
Start: 2019-12-02 | End: 2019-12-09

## 2019-12-02 ASSESSMENT — ENCOUNTER SYMPTOMS
COUGH: 1
SHORTNESS OF BREATH: 1
WHEEZING: 0

## 2019-12-03 ASSESSMENT — ENCOUNTER SYMPTOMS
SORE THROAT: 1
SINUS PAIN: 0
EYE PAIN: 0
EYE REDNESS: 0
RHINORRHEA: 0
APNEA: 0
EYE ITCHING: 0
TROUBLE SWALLOWING: 0
SINUS PRESSURE: 0
CHEST TIGHTNESS: 1
EYE DISCHARGE: 0

## 2019-12-09 DIAGNOSIS — K21.9 GASTROESOPHAGEAL REFLUX DISEASE, ESOPHAGITIS PRESENCE NOT SPECIFIED: ICD-10-CM

## 2019-12-09 RX ORDER — RANITIDINE 150 MG/1
TABLET ORAL
Qty: 60 TABLET | Refills: 11 | OUTPATIENT
Start: 2019-12-09

## 2019-12-13 ENCOUNTER — TELEPHONE (OUTPATIENT)
Dept: PRIMARY CARE CLINIC | Age: 44
End: 2019-12-13

## 2019-12-31 DIAGNOSIS — J45.901 ALLERGIC BRONCHITIS WITH ACUTE EXACERBATION: ICD-10-CM

## 2019-12-31 RX ORDER — ALBUTEROL SULFATE 90 UG/1
2 AEROSOL, METERED RESPIRATORY (INHALATION) 4 TIMES DAILY PRN
Qty: 1 INHALER | Refills: 3 | Status: SHIPPED | OUTPATIENT
Start: 2019-12-31 | End: 2022-07-24 | Stop reason: SDUPTHER

## 2020-01-17 RX ORDER — SITAGLIPTIN 100 MG/1
TABLET, FILM COATED ORAL
Qty: 30 TABLET | Refills: 11 | Status: SHIPPED | OUTPATIENT
Start: 2020-01-17 | End: 2021-02-03 | Stop reason: SDUPTHER

## 2020-02-17 RX ORDER — LISINOPRIL 2.5 MG/1
TABLET ORAL
Qty: 30 TABLET | Refills: 11 | Status: SHIPPED | OUTPATIENT
Start: 2020-02-17 | End: 2021-02-17 | Stop reason: SDUPTHER

## 2020-02-17 RX ORDER — ASPIRIN 81 MG/1
TABLET, COATED ORAL
Qty: 30 TABLET | Refills: 11 | Status: SHIPPED | OUTPATIENT
Start: 2020-02-17 | End: 2021-02-17 | Stop reason: SDUPTHER

## 2020-02-17 NOTE — TELEPHONE ENCOUNTER
Health Maintenance   Topic Date Due    Diabetic foot exam  06/07/2020 (Originally 5/17/2019)    Diabetic retinal exam  06/07/2020 (Originally 5/16/2019)    Hepatitis B vaccine (1 of 3 - Risk 3-dose series) 06/07/2020 (Originally 7/9/1996)    DTaP/Tdap/Td vaccine (1 - Tdap) 06/07/2020 (Originally 7/9/1988)    Cervical cancer screen  06/07/2020 (Originally 7/9/1998)    HIV screen  06/07/2020 (Originally 7/9/1992)    A1C test (Diabetic or Prediabetic)  09/28/2020    Diabetic microalbuminuria test  09/28/2020    Lipid screen  09/28/2020    Potassium monitoring  09/28/2020    Creatinine monitoring  09/28/2020    Shingles Vaccine (1 of 2) 07/09/2027    Flu vaccine  Completed    Pneumococcal 0-64 years Vaccine  Completed    Hepatitis A vaccine  Aged Out    Hib vaccine  Aged Out    Meningococcal (ACWY) vaccine  Aged Out             (applicable per patient's age: Cancer Screenings, Depression Screening, Fall Risk Screening, Immunizations)    Hemoglobin A1C (%)   Date Value   09/28/2019 7.0 (H)   08/19/2019 7.4   08/21/2018 8.0 (H)     Microalb/Crt. Ratio (mcg/mg creat)   Date Value   09/28/2019 CANNOT BE CALCULATED     LDL Cholesterol (mg/dL)   Date Value   09/28/2019 85     AST (U/L)   Date Value   09/28/2019 23     ALT (U/L)   Date Value   09/28/2019 28     BUN (mg/dL)   Date Value   09/28/2019 12      (goal A1C is < 7)   (goal LDL is <100) need 30-50% reduction from baseline     BP Readings from Last 3 Encounters:   12/02/19 118/79   09/30/19 110/76   09/26/19 (!) 134/103    (goal /80)      All Future Testing planned in [facility]:  Lab Frequency Next Occurrence       Next Visit Date:  No future appointments.          Patient Active Problem List:     Type 2 diabetes mellitus without complication, without long-term current use of insulin (HCC)     Tobacco abuse     Single kidney     Hyperlipidemia     Syncope     PCOS (polycystic ovarian syndrome)     Gastroesophageal reflux disease     Leukocytosis

## 2020-02-21 ENCOUNTER — APPOINTMENT (OUTPATIENT)
Dept: CT IMAGING | Age: 45
End: 2020-02-21
Payer: COMMERCIAL

## 2020-02-21 ENCOUNTER — OFFICE VISIT (OUTPATIENT)
Dept: PRIMARY CARE CLINIC | Age: 45
End: 2020-02-21
Payer: COMMERCIAL

## 2020-02-21 ENCOUNTER — HOSPITAL ENCOUNTER (EMERGENCY)
Age: 45
Discharge: HOME | End: 2020-02-21
Attending: EMERGENCY MEDICINE
Payer: COMMERCIAL

## 2020-02-21 VITALS
OXYGEN SATURATION: 97 % | HEIGHT: 62 IN | DIASTOLIC BLOOD PRESSURE: 108 MMHG | TEMPERATURE: 98 F | RESPIRATION RATE: 18 BRPM | HEART RATE: 80 BPM | BODY MASS INDEX: 39.75 KG/M2 | SYSTOLIC BLOOD PRESSURE: 136 MMHG | WEIGHT: 216 LBS

## 2020-02-21 LAB
-: ABNORMAL
ALBUMIN SERPL-MCNC: 4.1 G/DL (ref 3.5–5.2)
ALBUMIN/GLOBULIN RATIO: 1.4 (ref 1–2.5)
ALP BLD-CCNC: 77 U/L (ref 35–104)
ALT SERPL-CCNC: 21 U/L (ref 5–33)
AMORPHOUS: ABNORMAL
ANION GAP SERPL CALCULATED.3IONS-SCNC: 13 MMOL/L (ref 9–17)
AST SERPL-CCNC: 15 U/L
BACTERIA: ABNORMAL
BILIRUB SERPL-MCNC: 0.42 MG/DL (ref 0.3–1.2)
BILIRUBIN DIRECT: <0.08 MG/DL
BILIRUBIN URINE: NEGATIVE
BILIRUBIN, INDIRECT: NORMAL MG/DL (ref 0–1)
BILIRUBIN, POC: ABNORMAL
BLOOD URINE, POC: ABNORMAL
BUN BLDV-MCNC: 11 MG/DL (ref 6–20)
BUN/CREAT BLD: 17 (ref 9–20)
CALCIUM SERPL-MCNC: 9.3 MG/DL (ref 8.6–10.4)
CASTS UA: ABNORMAL /LPF
CHLORIDE BLD-SCNC: 98 MMOL/L (ref 98–107)
CLARITY, POC: ABNORMAL
CO2: 24 MMOL/L (ref 20–31)
COLOR, POC: ABNORMAL
COLOR: YELLOW
COMMENT UA: ABNORMAL
CREAT SERPL-MCNC: 0.64 MG/DL (ref 0.5–0.9)
CRYSTALS, UA: ABNORMAL /HPF
EPITHELIAL CELLS UA: ABNORMAL /HPF (ref 0–25)
GFR AFRICAN AMERICAN: >60 ML/MIN
GFR NON-AFRICAN AMERICAN: >60 ML/MIN
GFR SERPL CREATININE-BSD FRML MDRD: ABNORMAL ML/MIN/{1.73_M2}
GFR SERPL CREATININE-BSD FRML MDRD: ABNORMAL ML/MIN/{1.73_M2}
GLOBULIN: NORMAL G/DL (ref 1.5–3.8)
GLUCOSE BLD-MCNC: 268 MG/DL (ref 70–99)
GLUCOSE URINE, POC: >1000
GLUCOSE URINE: ABNORMAL
HCG(URINE) PREGNANCY TEST: NEGATIVE
HCT VFR BLD CALC: 43.7 % (ref 36.3–47.1)
HEMOGLOBIN: 14.7 G/DL (ref 11.9–15.1)
KETONES, POC: ABNORMAL
KETONES, URINE: NEGATIVE
LEUKOCYTE EST, POC: ABNORMAL
LEUKOCYTE ESTERASE, URINE: ABNORMAL
LIPASE: 25 U/L (ref 13–60)
MCH RBC QN AUTO: 31.5 PG (ref 25.2–33.5)
MCHC RBC AUTO-ENTMCNC: 33.6 G/DL (ref 28.4–34.8)
MCV RBC AUTO: 93.8 FL (ref 82.6–102.9)
MUCUS: ABNORMAL
NITRITE, POC: ABNORMAL
NITRITE, URINE: NEGATIVE
NRBC AUTOMATED: 0 PER 100 WBC
OTHER OBSERVATIONS UA: ABNORMAL
PDW BLD-RTO: 12.1 % (ref 11.8–14.4)
PH UA: 7.5 (ref 5–9)
PH, POC: 7.5
PLATELET # BLD: 242 K/UL (ref 138–453)
PMV BLD AUTO: 9.9 FL (ref 8.1–13.5)
POTASSIUM SERPL-SCNC: 4.2 MMOL/L (ref 3.7–5.3)
PROTEIN UA: ABNORMAL
PROTEIN, POC: 100
RBC # BLD: 4.66 M/UL (ref 3.95–5.11)
RBC UA: ABNORMAL /HPF (ref 0–2)
RENAL EPITHELIAL, UA: ABNORMAL /HPF
SODIUM BLD-SCNC: 135 MMOL/L (ref 135–144)
SPECIFIC GRAVITY UA: 1.01 (ref 1.01–1.02)
SPECIFIC GRAVITY, POC: 1.02
TOTAL PROTEIN: 7 G/DL (ref 6.4–8.3)
TRICHOMONAS: ABNORMAL
TURBIDITY: ABNORMAL
URINE HGB: ABNORMAL
UROBILINOGEN, POC: 1
UROBILINOGEN, URINE: NORMAL
WBC # BLD: 13.4 K/UL (ref 3.5–11.3)
WBC UA: ABNORMAL /HPF (ref 0–5)
YEAST: ABNORMAL

## 2020-02-21 PROCEDURE — 36415 COLL VENOUS BLD VENIPUNCTURE: CPT

## 2020-02-21 PROCEDURE — 80048 BASIC METABOLIC PNL TOTAL CA: CPT

## 2020-02-21 PROCEDURE — 81001 URINALYSIS AUTO W/SCOPE: CPT

## 2020-02-21 PROCEDURE — 85027 COMPLETE CBC AUTOMATED: CPT

## 2020-02-21 PROCEDURE — 96375 TX/PRO/DX INJ NEW DRUG ADDON: CPT

## 2020-02-21 PROCEDURE — 96374 THER/PROPH/DIAG INJ IV PUSH: CPT

## 2020-02-21 PROCEDURE — 81025 URINE PREGNANCY TEST: CPT

## 2020-02-21 PROCEDURE — 83690 ASSAY OF LIPASE: CPT

## 2020-02-21 PROCEDURE — 2580000003 HC RX 258: Performed by: EMERGENCY MEDICINE

## 2020-02-21 PROCEDURE — 6360000002 HC RX W HCPCS: Performed by: EMERGENCY MEDICINE

## 2020-02-21 PROCEDURE — 81003 URINALYSIS AUTO W/O SCOPE: CPT | Performed by: NURSE PRACTITIONER

## 2020-02-21 PROCEDURE — 80076 HEPATIC FUNCTION PANEL: CPT

## 2020-02-21 PROCEDURE — 74176 CT ABD & PELVIS W/O CONTRAST: CPT

## 2020-02-21 PROCEDURE — 99284 EMERGENCY DEPT VISIT MOD MDM: CPT

## 2020-02-21 RX ORDER — KETOROLAC TROMETHAMINE 15 MG/ML
30 INJECTION, SOLUTION INTRAMUSCULAR; INTRAVENOUS ONCE
Status: COMPLETED | OUTPATIENT
Start: 2020-02-21 | End: 2020-02-21

## 2020-02-21 RX ORDER — CEPHALEXIN 500 MG/1
500 CAPSULE ORAL 3 TIMES DAILY
Qty: 21 CAPSULE | Refills: 0 | Status: SHIPPED | OUTPATIENT
Start: 2020-02-21 | End: 2020-02-28

## 2020-02-21 RX ORDER — HYDROCODONE BITARTRATE AND ACETAMINOPHEN 5; 325 MG/1; MG/1
1 TABLET ORAL EVERY 6 HOURS PRN
Qty: 6 TABLET | Refills: 0 | Status: SHIPPED | OUTPATIENT
Start: 2020-02-21 | End: 2020-02-24

## 2020-02-21 RX ORDER — 0.9 % SODIUM CHLORIDE 0.9 %
1000 INTRAVENOUS SOLUTION INTRAVENOUS ONCE
Status: COMPLETED | OUTPATIENT
Start: 2020-02-21 | End: 2020-02-21

## 2020-02-21 RX ORDER — MORPHINE SULFATE 4 MG/ML
4 INJECTION, SOLUTION INTRAMUSCULAR; INTRAVENOUS ONCE
Status: COMPLETED | OUTPATIENT
Start: 2020-02-21 | End: 2020-02-21

## 2020-02-21 RX ADMIN — SODIUM CHLORIDE 1000 ML: 9 INJECTION, SOLUTION INTRAVENOUS at 15:06

## 2020-02-21 RX ADMIN — CEFTRIAXONE 1 G: 1 INJECTION, POWDER, FOR SOLUTION INTRAMUSCULAR; INTRAVENOUS at 16:14

## 2020-02-21 RX ADMIN — MORPHINE SULFATE 4 MG: 4 INJECTION, SOLUTION INTRAMUSCULAR; INTRAVENOUS at 16:13

## 2020-02-21 RX ADMIN — KETOROLAC TROMETHAMINE 30 MG: 15 INJECTION, SOLUTION INTRAMUSCULAR; INTRAVENOUS at 15:06

## 2020-02-21 ASSESSMENT — ENCOUNTER SYMPTOMS
BACK PAIN: 1
NAUSEA: 1
ABDOMINAL DISTENTION: 0
ABDOMINAL PAIN: 1
CONSTIPATION: 0
DIARRHEA: 0
COUGH: 0
SHORTNESS OF BREATH: 0
SORE THROAT: 0
VOMITING: 0
WHEEZING: 0
RHINORRHEA: 0

## 2020-02-21 ASSESSMENT — PAIN DESCRIPTION - DESCRIPTORS: DESCRIPTORS: SHARP

## 2020-02-21 ASSESSMENT — PAIN SCALES - GENERAL
PAINLEVEL_OUTOF10: 5
PAINLEVEL_OUTOF10: 6
PAINLEVEL_OUTOF10: 6

## 2020-02-21 NOTE — ED PROVIDER NOTES
Never Used   Substance and Sexual Activity    Alcohol use: No     Alcohol/week: 6.0 standard drinks     Types: 3 Glasses of wine, 1 Cans of beer, 2 Shots of liquor per week     Comment: biweekly or monthly    Drug use: No    Sexual activity: Yes   Lifestyle    Physical activity:     Days per week: Not on file     Minutes per session: Not on file    Stress: Not on file   Relationships    Social connections:     Talks on phone: Not on file     Gets together: Not on file     Attends [employer] service: Not on file     Active member of club or organization: Not on file     Attends meetings of clubs or organizations: Not on file     Relationship status: Not on file    Intimate partner violence:     Fear of current or ex partner: Not on file     Emotionally abused: Not on file     Physically abused: Not on file     Forced sexual activity: Not on file   Other Topics Concern    Not on file   Social History Narrative    Not on file       Family History   Problem Relation Age of Onset    High Cholesterol Mother     Other Brother     Alcohol Abuse Maternal Grandmother     Cancer Maternal Grandmother     Depression Maternal Grandmother     Diabetes Maternal Grandmother     Heart Disease Maternal Grandmother     High Blood Pressure Maternal Grandmother     High Cholesterol Maternal Grandmother     Mental Illness Maternal Grandmother     Obesity Maternal Grandmother     Other Maternal Grandfather        Allergies:  Pcn [penicillins]    Home Medications:  Prior to Admission medications    Medication Sig Start Date End Date Taking? Authorizing Provider   cephALEXin (KEFLEX) 500 MG capsule Take 1 capsule by mouth 3 times daily for 7 days 2/21/20 2/28/20 Yes Get Ervin,    HYDROcodone-acetaminophen (NORCO) 5-325 MG per tablet Take 1 tablet by mouth every 6 hours as needed for Pain for up to 3 days.  2/21/20 2/24/20 Yes Get Ervin DO   lisinopril (PRINIVIL;ZESTRIL) 2.5 MG tablet TAKE 1 TABLET BY MOUTH EVERY DAY 2/17/20  Yes Sudheer Darden Might, RONN Pérez CNP   ASPIRIN LOW DOSE 81 MG EC tablet TAKE 1 TABLET BY MOUTH EVERY DAY 2/17/20  Yes RONN Srivastava CNP   metFORMIN (GLUCOPHAGE) 1000 MG tablet TAKE 1 TABLET BY MOUTH TWICE A DAY WITH MEALS 2/17/20  Yes Sudheer Lindsay, RONN Pérez CNP   JANUVIA 100 MG tablet TAKE 1 TABLET BY MOUTH EVERY DAY 1/17/20  Yes RONN Hawkins CNP   albuterol sulfate  (90 Base) MCG/ACT inhaler Inhale 2 puffs into the lungs 4 times daily as needed for Wheezing or Shortness of Breath 12/31/19  Yes RONN Srivastava CNP   omeprazole (PRILOSEC) 40 MG delayed release capsule TAKE 1 CAPSULE BY MOUTH EVERY DAY 12/12/19  Yes RONN Srivastava CNP   metoprolol succinate (TOPROL XL) 25 MG extended release tablet TAKE 1/2 TABLET BY MOUTH ONCE DAILY 10/1/19  Yes Elyse Sow MD   glimepiride (AMARYL) 1 MG tablet Take 1 tablet by mouth every morning (before breakfast) 9/30/19  Yes RONN Hawkins CNP   atorvastatin (LIPITOR) 40 MG tablet Take 1 tablet by mouth daily 8/19/19  Yes RONN Hawkins CNP   ibuprofen (ADVIL;MOTRIN) 200 MG tablet Take 800 mg by mouth every 6 hours as needed for Pain   Yes Historical Provider, MD   CRANBERRY-VITAMIN C PO Take by mouth daily     Historical Provider, MD       REVIEW OF SYSTEMS    (2-9 systems for level 4, 10 or more for level 5)      Review of Systems   Constitutional: Negative for activity change, appetite change, fatigue and fever. HENT: Negative for congestion, rhinorrhea and sore throat. Respiratory: Negative for cough, shortness of breath and wheezing. Cardiovascular: Negative for chest pain, palpitations and leg swelling. Gastrointestinal: Positive for abdominal pain and nausea. Negative for abdominal distention, constipation, diarrhea and vomiting. Genitourinary: Positive for flank pain and hematuria. Negative for decreased urine volume, dysuria, vaginal bleeding, vaginal discharge and vaginal pain. pelvis and some abdominal labs. PLAN (LABS / IMAGING / EKG):  Orders Placed This Encounter   Procedures    CT ABDOMEN PELVIS WO CONTRAST    Pregnancy, Urine    Urinalysis with microscopic    CBC    Basic Metabolic Panel    Lipase    Hepatic function panel    Insert peripheral IV       MEDICATIONS ORDERED:  Orders Placed This Encounter   Medications    ketorolac (TORADOL) injection 30 mg    0.9 % sodium chloride bolus    morphine injection 4 mg    cefTRIAXone (ROCEPHIN) 1 g in sterile water 10 mL IV syringe    cephALEXin (KEFLEX) 500 MG capsule     Sig: Take 1 capsule by mouth 3 times daily for 7 days     Dispense:  21 capsule     Refill:  0    HYDROcodone-acetaminophen (NORCO) 5-325 MG per tablet     Sig: Take 1 tablet by mouth every 6 hours as needed for Pain for up to 3 days.      Dispense:  6 tablet     Refill:  0       DIAGNOSTIC RESULTS / EMERGENCY DEPARTMENT COURSE / MDM     LABS:  Results for orders placed or performed during the hospital encounter of 02/21/20   Pregnancy, Urine   Result Value Ref Range    HCG(Urine) Pregnancy Test NEGATIVE NEGATIVE   Urinalysis with microscopic   Result Value Ref Range    Color, UA YELLOW YELLOW    Turbidity UA CLOUDY (A) CLEAR    Glucose, Ur 3+ (A) NEGATIVE    Bilirubin Urine NEGATIVE NEGATIVE    Ketones, Urine NEGATIVE NEGATIVE    Specific Gravity, UA 1.015 1.010 - 1.020    Urine Hgb 3+ (A) NEGATIVE    pH, UA 7.5 5.0 - 9.0    Protein, UA 3+ (A) NEGATIVE    Urobilinogen, Urine Normal Normal    Nitrite, Urine NEGATIVE NEGATIVE    Leukocyte Esterase, Urine TRACE (A) NEGATIVE    Urinalysis Comments NOT REPORTED     -          WBC, UA GREATER THAN 100 0 - 5 /HPF    RBC, UA 10 TO 20 0 - 2 /HPF    Casts UA NOT REPORTED /LPF    Crystals UA NOT REPORTED None /HPF    Epithelial Cells UA 2 TO 5 0 - 25 /HPF    Renal Epithelial, Urine NOT REPORTED 0 /HPF    Bacteria, UA 3+ (A) None    Mucus, UA NOT REPORTED None    Trichomonas, UA NOT REPORTED None    Amorphous, UA NOT 5. Small midline fat containing periumbilical hernia. 6. Stable left adrenal mass measuring up to 1.5 cm, likely a left adrenal   adenoma. 7. Old granulomatous disease. EMERGENCY DEPARTMENT COURSE:  Still in significant pain after Toradol. She does have a slight leukocytosis and urine that looks to have many bacteria and lots of white blood cells. And concern for UTI her CT scan is concerning for stranding around the kidney, as well as hydronephrosis and some mild hydroureter with on visualize stone. She could have passed a stone but she still in significant pain. I think this is likely more of a pyelonephritis and given her solitary kidney I am recommending she be admitted. Her kidney function and creatinine is not elevated. Patient is not agreeable with admission as she needs to get home to her children. I did discuss risks and benefits of her leaving especially with a single kidney. And she understands the risks she knows that her infection could get worse and lead to possibly death. Plan will be to do IV antibiotics and then send her home on oral antibiotics with pain medication. She has followed with nephrology in the past and she will need to follow-up with them as well as with her primary care provider. She will return if she has any worsening symptoms. Patient is of capable decision-making capacity and no she may return at any time for further evaluation. FINAL IMPRESSION      1.  Pyelonephritis          DISPOSITION / PLAN     DISPOSITION Muse 02/21/2020 05:34:57 PM      PATIENT REFERRED TO:  Bon Lock, APRN - CNP  [address]  [phone]    Schedule an appointment as soon as possible for a visit in 3 days      Deven Lee MD  [address] [facility] [phone]    Schedule an appointment as soon as possible for a visit in 3 days      [facility] [place]  [address] [place] [id number]  [phone]    If symptoms worsen      DISCHARGE MEDICATIONS:  Discharge Medication List as of 2/21/2020  4:29 PM      START taking these medications    Details   cephALEXin (KEFLEX) 500 MG capsule Take 1 capsule by mouth 3 times daily for 7 days, Disp-21 capsule, R-0Print      HYDROcodone-acetaminophen (NORCO) 5-325 MG per tablet Take 1 tablet by mouth every 6 hours as needed for Pain for up to 3 days. , Disp-6 tablet, R-0Print             Humble Edwards  5:35 PM    Attending Emergency Physician  [facility]    (Please note that portions of this note were completed with a voice recognition program.  Effortswere made to edit the dictations but occasionally words are mis-transcribed.)              Aneta Bennett,   02/21/20 1730

## 2020-02-24 ENCOUNTER — APPOINTMENT (OUTPATIENT)
Dept: CT IMAGING | Age: 45
End: 2020-02-24
Payer: COMMERCIAL

## 2020-02-24 ENCOUNTER — TELEPHONE (OUTPATIENT)
Dept: PRIMARY CARE CLINIC | Age: 45
End: 2020-02-24

## 2020-02-24 ENCOUNTER — HOSPITAL ENCOUNTER (EMERGENCY)
Age: 45
Discharge: HOME | End: 2020-02-24
Attending: EMERGENCY MEDICINE
Payer: COMMERCIAL

## 2020-02-24 VITALS
HEIGHT: 62 IN | OXYGEN SATURATION: 95 % | SYSTOLIC BLOOD PRESSURE: 97 MMHG | BODY MASS INDEX: 39.75 KG/M2 | DIASTOLIC BLOOD PRESSURE: 72 MMHG | TEMPERATURE: 97.4 F | RESPIRATION RATE: 16 BRPM | HEART RATE: 78 BPM | WEIGHT: 216 LBS

## 2020-02-24 LAB
-: ABNORMAL
ABSOLUTE EOS #: 0.35 K/UL (ref 0–0.44)
ABSOLUTE IMMATURE GRANULOCYTE: 0.09 K/UL (ref 0–0.3)
ABSOLUTE LYMPH #: 4.1 K/UL (ref 1.1–3.7)
ABSOLUTE MONO #: 0.61 K/UL (ref 0.1–1.2)
ALBUMIN SERPL-MCNC: 3.9 G/DL (ref 3.5–5.2)
ALBUMIN/GLOBULIN RATIO: 1.3 (ref 1–2.5)
ALP BLD-CCNC: 77 U/L (ref 35–104)
ALT SERPL-CCNC: 20 U/L (ref 5–33)
AMORPHOUS: ABNORMAL
ANION GAP SERPL CALCULATED.3IONS-SCNC: 14 MMOL/L (ref 9–17)
AST SERPL-CCNC: 18 U/L
BACTERIA: ABNORMAL
BASOPHILS # BLD: 1 % (ref 0–2)
BASOPHILS ABSOLUTE: 0.07 K/UL (ref 0–0.2)
BILIRUB SERPL-MCNC: 0.25 MG/DL (ref 0.3–1.2)
BILIRUBIN DIRECT: <0.08 MG/DL
BILIRUBIN URINE: NEGATIVE
BILIRUBIN, INDIRECT: ABNORMAL MG/DL (ref 0–1)
BUN BLDV-MCNC: 13 MG/DL (ref 6–20)
BUN/CREAT BLD: 21 (ref 9–20)
CALCIUM SERPL-MCNC: 9.3 MG/DL (ref 8.6–10.4)
CASTS UA: ABNORMAL /LPF
CHLORIDE BLD-SCNC: 98 MMOL/L (ref 98–107)
CO2: 23 MMOL/L (ref 20–31)
COLOR: YELLOW
COMMENT UA: ABNORMAL
CREAT SERPL-MCNC: 0.61 MG/DL (ref 0.5–0.9)
CRYSTALS, UA: ABNORMAL /HPF
DIFFERENTIAL TYPE: ABNORMAL
EOSINOPHILS RELATIVE PERCENT: 4 % (ref 1–4)
EPITHELIAL CELLS UA: ABNORMAL /HPF (ref 0–25)
GFR AFRICAN AMERICAN: >60 ML/MIN
GFR NON-AFRICAN AMERICAN: >60 ML/MIN
GFR SERPL CREATININE-BSD FRML MDRD: ABNORMAL ML/MIN/{1.73_M2}
GFR SERPL CREATININE-BSD FRML MDRD: ABNORMAL ML/MIN/{1.73_M2}
GLOBULIN: ABNORMAL G/DL (ref 1.5–3.8)
GLUCOSE BLD-MCNC: 290 MG/DL (ref 70–99)
GLUCOSE URINE: ABNORMAL
HCT VFR BLD CALC: 45.9 % (ref 36.3–47.1)
HEMOGLOBIN: 15.2 G/DL (ref 11.9–15.1)
IMMATURE GRANULOCYTES: 1 %
KETONES, URINE: NEGATIVE
LACTIC ACID: 1.5 MMOL/L (ref 0.5–2.2)
LEUKOCYTE ESTERASE, URINE: NEGATIVE
LIPASE: 25 U/L (ref 13–60)
LYMPHOCYTES # BLD: 42 % (ref 24–43)
MCH RBC QN AUTO: 32.1 PG (ref 25.2–33.5)
MCHC RBC AUTO-ENTMCNC: 33.1 G/DL (ref 28.4–34.8)
MCV RBC AUTO: 97 FL (ref 82.6–102.9)
MONOCYTES # BLD: 6 % (ref 3–12)
MUCUS: ABNORMAL
NITRITE, URINE: NEGATIVE
NRBC AUTOMATED: 0 PER 100 WBC
OTHER OBSERVATIONS UA: ABNORMAL
PDW BLD-RTO: 12.3 % (ref 11.8–14.4)
PH UA: 6.5 (ref 5–9)
PLATELET # BLD: 244 K/UL (ref 138–453)
PLATELET ESTIMATE: ABNORMAL
PMV BLD AUTO: 10.1 FL (ref 8.1–13.5)
POTASSIUM SERPL-SCNC: 4.3 MMOL/L (ref 3.7–5.3)
PROTEIN UA: NEGATIVE
RBC # BLD: 4.73 M/UL (ref 3.95–5.11)
RBC # BLD: ABNORMAL 10*6/UL
RBC UA: ABNORMAL /HPF (ref 0–2)
RENAL EPITHELIAL, UA: ABNORMAL /HPF
SEG NEUTROPHILS: 46 % (ref 36–65)
SEGMENTED NEUTROPHILS ABSOLUTE COUNT: 4.65 K/UL (ref 1.5–8.1)
SODIUM BLD-SCNC: 135 MMOL/L (ref 135–144)
SPECIFIC GRAVITY UA: 1.02 (ref 1.01–1.02)
TOTAL PROTEIN: 7 G/DL (ref 6.4–8.3)
TRICHOMONAS: ABNORMAL
TURBIDITY: CLEAR
URINE HGB: NEGATIVE
UROBILINOGEN, URINE: NORMAL
WBC # BLD: 9.9 K/UL (ref 3.5–11.3)
WBC # BLD: ABNORMAL 10*3/UL
WBC UA: ABNORMAL /HPF (ref 0–5)
YEAST: ABNORMAL

## 2020-02-24 PROCEDURE — 80048 BASIC METABOLIC PNL TOTAL CA: CPT

## 2020-02-24 PROCEDURE — 80076 HEPATIC FUNCTION PANEL: CPT

## 2020-02-24 PROCEDURE — 85025 COMPLETE CBC W/AUTO DIFF WBC: CPT

## 2020-02-24 PROCEDURE — 83605 ASSAY OF LACTIC ACID: CPT

## 2020-02-24 PROCEDURE — 36415 COLL VENOUS BLD VENIPUNCTURE: CPT

## 2020-02-24 PROCEDURE — 96375 TX/PRO/DX INJ NEW DRUG ADDON: CPT

## 2020-02-24 PROCEDURE — 96374 THER/PROPH/DIAG INJ IV PUSH: CPT

## 2020-02-24 PROCEDURE — 74177 CT ABD & PELVIS W/CONTRAST: CPT

## 2020-02-24 PROCEDURE — 6360000004 HC RX CONTRAST MEDICATION: Performed by: EMERGENCY MEDICINE

## 2020-02-24 PROCEDURE — 99284 EMERGENCY DEPT VISIT MOD MDM: CPT

## 2020-02-24 PROCEDURE — 6360000002 HC RX W HCPCS: Performed by: EMERGENCY MEDICINE

## 2020-02-24 PROCEDURE — 81001 URINALYSIS AUTO W/SCOPE: CPT

## 2020-02-24 PROCEDURE — 83690 ASSAY OF LIPASE: CPT

## 2020-02-24 PROCEDURE — 2580000003 HC RX 258: Performed by: EMERGENCY MEDICINE

## 2020-02-24 RX ORDER — KETOROLAC TROMETHAMINE 15 MG/ML
30 INJECTION, SOLUTION INTRAMUSCULAR; INTRAVENOUS ONCE
Status: COMPLETED | OUTPATIENT
Start: 2020-02-24 | End: 2020-02-24

## 2020-02-24 RX ORDER — PROMETHAZINE HYDROCHLORIDE 25 MG/1
25 TABLET ORAL EVERY 8 HOURS PRN
Qty: 21 TABLET | Refills: 0 | Status: SHIPPED | OUTPATIENT
Start: 2020-02-24 | End: 2020-03-02

## 2020-02-24 RX ORDER — PROMETHAZINE HYDROCHLORIDE 25 MG/ML
12.5 INJECTION, SOLUTION INTRAMUSCULAR; INTRAVENOUS ONCE
Status: COMPLETED | OUTPATIENT
Start: 2020-02-24 | End: 2020-02-24

## 2020-02-24 RX ORDER — 0.9 % SODIUM CHLORIDE 0.9 %
1000 INTRAVENOUS SOLUTION INTRAVENOUS ONCE
Status: COMPLETED | OUTPATIENT
Start: 2020-02-24 | End: 2020-02-24

## 2020-02-24 RX ADMIN — PROMETHAZINE HYDROCHLORIDE 12.5 MG: 25 INJECTION, SOLUTION INTRAMUSCULAR; INTRAVENOUS at 10:13

## 2020-02-24 RX ADMIN — KETOROLAC TROMETHAMINE 30 MG: 15 INJECTION, SOLUTION INTRAMUSCULAR; INTRAVENOUS at 10:13

## 2020-02-24 RX ADMIN — IOPAMIDOL 75 ML: 755 INJECTION, SOLUTION INTRAVENOUS at 10:37

## 2020-02-24 RX ADMIN — SODIUM CHLORIDE 1000 ML: 9 INJECTION, SOLUTION INTRAVENOUS at 10:13

## 2020-02-24 ASSESSMENT — PAIN SCALES - GENERAL
PAINLEVEL_OUTOF10: 4
PAINLEVEL_OUTOF10: 5

## 2020-02-24 ASSESSMENT — ENCOUNTER SYMPTOMS
BACK PAIN: 1
SORE THROAT: 0
DIARRHEA: 0
SHORTNESS OF BREATH: 0
COUGH: 0
CONSTIPATION: 0
VOMITING: 0
NAUSEA: 1
ABDOMINAL PAIN: 1

## 2020-02-24 ASSESSMENT — PAIN DESCRIPTION - FREQUENCY: FREQUENCY: CONTINUOUS

## 2020-02-24 ASSESSMENT — PAIN DESCRIPTION - PAIN TYPE: TYPE: ACUTE PAIN

## 2020-02-24 ASSESSMENT — PAIN DESCRIPTION - ORIENTATION: ORIENTATION: RIGHT

## 2020-02-24 ASSESSMENT — PAIN DESCRIPTION - DESCRIPTORS: DESCRIPTORS: ACHING

## 2020-02-24 ASSESSMENT — PAIN DESCRIPTION - LOCATION: LOCATION: FLANK

## 2020-02-24 NOTE — ED PROVIDER NOTES
[facility] ED  eMERGENCY dEPARTMENT eNCOUnter      Pt Name: Adán Albarran  MRN: [id number]  [place] 1975  Date of evaluation: 2/24/2020  Provider: Dean Yeh [address]       Chief Complaint   Patient presents with    Flank Pain     Right. Pt was treated in the ED 3 days ago for a kidney infection. Per pt, she declined to be admitted. Pt has 1 kidney. Pt has been taking antibiotics and is feeling nausea and dizziness         HISTORY OF PRESENT ILLNESS   (Location/Symptom, Timing/Onset, Context/Setting, Quality, Duration, Modifying Factors, Severity) Note limiting factors. HPI    Adán Albarran is a 45 y.o. female who presents to the emergency department with complaint of back/abdominal pain. Patient was seen on Friday and diagnosed with pyelonephritis. She is currently on Keflex and states that she feels somewhat better but not to the point where she thinks she should be. She has concerned that the infection may be persisting and secondary to this presents for reevaluation    Nursing Notes were reviewed. REVIEW OF SYSTEMS    (2+ for level 4; 10+ for level 5)   Review of Systems   Constitutional: Positive for chills. Negative for fever. HENT: Negative for congestion and sore throat. Respiratory: Negative for cough and shortness of breath. Cardiovascular: Negative for chest pain. Gastrointestinal: Positive for abdominal pain and nausea. Negative for constipation, diarrhea and vomiting. Genitourinary: Positive for dysuria and flank pain. Musculoskeletal: Positive for back pain. Skin: Negative for rash. Neurological: Negative for light-headedness and headaches. All other systems reviewed and are negative.       PAST MEDICAL HISTORY     Past Medical History:   Diagnosis Date    Anxiety 2007    Depression 1993    GERD (gastroesophageal reflux disease) 2010    H/O cardiovascular stress test 07/02/2018    Largerly normal myocardial perfusion imaging MICROSCOPIC URINALYSIS - Abnormal; Notable for the following components:    Bacteria, UA TRACE (*)     All other components within normal limits   LIPASE   LACTIC ACID        All other labs were within normal range or not returned as of this dictation. EMERGENCY DEPARTMENT COURSE and DIFFERENTIAL DIAGNOSIS/MDM:   Vitals:    Vitals:    02/24/20 0914 02/24/20 1027 02/24/20 1028   BP: 130/65  97/72   Pulse: 80 78    Resp: 16     Temp: 97.4 °F (36.3 °C)     TempSrc: Tympanic     SpO2: 100% 95%    Weight: 216 lb (98 kg)     Height: 5' 2\" (1.575 m)         Medications   0.9 % sodium chloride bolus (1,000 mLs Intravenous New Bag 2/24/20 1013)   ketorolac (TORADOL) injection 30 mg (30 mg Intravenous Given 2/24/20 1013)   promethazine (PHENERGAN) injection 12.5 mg (12.5 mg Intravenous Given 2/24/20 1013)   iopamidol (ISOVUE-370) 76 % injection 75 mL (75 mLs Intravenous Given 2/24/20 1037)       MDM. Patient arrived in no acute distress with stable vitals. She reported she felt somewhat improved but with persistent symptoms had concerned that she was failing outpatient therapy and therefore repeat work-up was obtained. Labs showed improvement of her white blood cell count now down to normal value and her lactic acid was normal.  Urine also showed great improvement of the white blood cell count and bacterial load. CT scan also showed improvement of the inflammatory change around the bladder. Therefore at this time with overall work-up being improved from the previous day and vital stable patient can be discharged home and will continue her outpatient therapy. REVAL:         CRITICAL CARE TIME   Total Critical Care time was minutes, excluding separately reportable procedures. There was a high probability of clinically significant/life threatening deterioration in the patient's condition which required my urgent intervention.      CONSULTS:  None    PROCEDURES:  Unless otherwise noted below, none

## 2020-02-25 ENCOUNTER — TELEPHONE (OUTPATIENT)
Dept: PRIMARY CARE CLINIC | Age: 45
End: 2020-02-25

## 2020-02-25 NOTE — TELEPHONE ENCOUNTER
Daniel [facility] Transitions Initial Follow Up Call    Outreach made within 2 business days of discharge: Yes    Patient: Dylan Estimable Patient : 1975   MRN: [id number]  Reason for Admission: There are no discharge diagnoses documented for the most recent discharge. Discharge Date: 20       Spoke with: 2020 @ 9:34am Called patient and left message for her to call office re: recent ER visit. Cf  2020 @ 11:06am Attempted to call patient for second time, no answer and message left for patient to call office. cf    Discharge department/facility: [facility] ER    TCM Interactive Patient Contact:  Was patient able to fill all prescriptions:   Was patient instructed to bring all medications to the follow-up visit:   Is patient taking all medications as directed in the discharge summary? Does patient understand their discharge instructions:   Does patient have questions or concerns that need addressed prior to 7-14 day follow up office visit:     Scheduled appointment with PCP within 7-14 days    Follow Up  No future appointments.     Rise Cramp

## 2020-03-19 ENCOUNTER — OFFICE VISIT (OUTPATIENT)
Dept: PRIMARY CARE CLINIC | Age: 45
End: 2020-03-19
Payer: COMMERCIAL

## 2020-03-19 VITALS
HEART RATE: 111 BPM | OXYGEN SATURATION: 99 % | TEMPERATURE: 98 F | HEIGHT: 63 IN | BODY MASS INDEX: 37.35 KG/M2 | DIASTOLIC BLOOD PRESSURE: 91 MMHG | SYSTOLIC BLOOD PRESSURE: 124 MMHG | WEIGHT: 210.8 LBS

## 2020-03-19 LAB
BILIRUBIN, POC: ABNORMAL
BLOOD URINE, POC: ABNORMAL
CLARITY, POC: ABNORMAL
COLOR, POC: YELLOW
GLUCOSE URINE, POC: ABNORMAL
INFLUENZA A ANTIBODY: NEGATIVE
INFLUENZA B ANTIBODY: NEGATIVE
KETONES, POC: ABNORMAL
LEUKOCYTE EST, POC: ABNORMAL
NITRITE, POC: ABNORMAL
PH, POC: 5.5
PROTEIN, POC: ABNORMAL
SPECIFIC GRAVITY, POC: 1.02
UROBILINOGEN, POC: 1

## 2020-03-19 PROCEDURE — 99213 OFFICE O/P EST LOW 20 MIN: CPT | Performed by: NURSE PRACTITIONER

## 2020-03-19 PROCEDURE — 81003 URINALYSIS AUTO W/O SCOPE: CPT | Performed by: NURSE PRACTITIONER

## 2020-03-19 PROCEDURE — 87804 INFLUENZA ASSAY W/OPTIC: CPT | Performed by: NURSE PRACTITIONER

## 2020-03-19 RX ORDER — BENZONATATE 200 MG/1
200 CAPSULE ORAL 3 TIMES DAILY PRN
Qty: 30 CAPSULE | Refills: 0 | Status: SHIPPED | OUTPATIENT
Start: 2020-03-19 | End: 2020-03-26

## 2020-03-19 RX ORDER — OSELTAMIVIR PHOSPHATE 75 MG/1
75 CAPSULE ORAL 2 TIMES DAILY
Qty: 10 CAPSULE | Refills: 0 | Status: SHIPPED | OUTPATIENT
Start: 2020-03-19 | End: 2020-03-24

## 2020-03-19 RX ORDER — CIPROFLOXACIN 500 MG/1
500 TABLET, FILM COATED ORAL 2 TIMES DAILY
Qty: 14 TABLET | Refills: 0 | Status: SHIPPED | OUTPATIENT
Start: 2020-03-19 | End: 2020-03-26

## 2020-03-19 ASSESSMENT — ENCOUNTER SYMPTOMS
CHEST TIGHTNESS: 0
EYE ITCHING: 0
TROUBLE SWALLOWING: 0
RHINORRHEA: 0
EYE DISCHARGE: 0
SINUS PRESSURE: 0
NAUSEA: 0
COUGH: 1
SHORTNESS OF BREATH: 0
SORE THROAT: 0
VOMITING: 0
SINUS PAIN: 0
WHEEZING: 0
DIARRHEA: 0

## 2020-03-19 NOTE — PROGRESS NOTES
[address] [facility]  [address]  Dept: [phone]  Dept Fax: [phone]    Landon Montalvo is a 45 y.o. female who [facility] today for her medical conditions/complaints as noted below. Landon Montalvo is c/o of Cough (x1 day) and Fever (101 fever)      HPI:     Val Zapata is here today for a walk in visit for fever and cough. Cough and fever began today. She also reports that she has had an increase in urinary frequncy and burning. She was    Fever    This is a new problem. The current episode started yesterday. The maximum temperature noted was 101 to 101.9 F. Associated symptoms include congestion, coughing (dry cough), headaches, muscle aches and urinary pain. Pertinent negatives include no chest pain, diarrhea, ear pain, nausea, rash, sore throat, vomiting or wheezing. She has tried NSAIDs for the symptoms. The treatment provided moderate relief. Risk factors: sick contacts (coworker friday cough)    Risk factors: no contaminated food, no contaminated water, no immunosuppression and no occupational exposure    Urinary Tract Infection    This is a new problem. The current episode started yesterday. The quality of the pain is described as aching. Associated symptoms include chills and frequency. Pertinent negatives include no flank pain, hematuria, nausea, urgency or vomiting. She has tried acetaminophen for the symptoms. The treatment provided moderate relief. Her past medical history is significant for recurrent UTIs. Past Medical History:   Diagnosis Date    Anxiety 2007    Depression 1993    GERD (gastroesophageal reflux disease) 2010    H/O cardiovascular stress test 07/02/2018    Largerly normal myocardial perfusion imaging with soft tissue artifact but without evidence of signinfcant myocardial ischemia or infarction. EF 75%.  The pt duke treadmill score is 6.    H/O echocardiogram  CRANBERRY-VITAMIN C PO Take by mouth daily        No current facility-administered medications for this visit. Allergies   Allergen Reactions    Pcn [Penicillins] Rash       Subjective:      Review of Systems   Constitutional: Positive for appetite change, chills, diaphoresis, fatigue and fever. Negative for activity change. HENT: Positive for congestion. Negative for ear discharge, ear pain, mouth sores, rhinorrhea, sinus pressure, sinus pain, sore throat and trouble swallowing. Eyes: Negative for discharge and itching. Respiratory: Positive for cough (dry cough). Negative for chest tightness, shortness of breath and wheezing. Cardiovascular: Negative for chest pain and palpitations. Gastrointestinal: Negative for diarrhea, nausea and vomiting. Genitourinary: Positive for dysuria and frequency. Negative for difficulty urinating, flank pain, hematuria, pelvic pain, urgency and vaginal discharge. Musculoskeletal: Positive for myalgias. Skin: Negative for rash and wound. Neurological: Positive for headaches. Negative for dizziness. Objective:     Physical Exam  Vitals signs and nursing note reviewed. Constitutional:       General: She is not in acute distress. Appearance: She is ill-appearing. She is not toxic-appearing or diaphoretic. HENT:      Head: Normocephalic and atraumatic. Right Ear: There is no impacted cerumen. Tympanic membrane is not erythematous or bulging. Left Ear: There is no impacted cerumen. Tympanic membrane is not erythematous or bulging. Nose: Mucosal edema, congestion and rhinorrhea (clear) present. Right Sinus: No maxillary sinus tenderness or frontal sinus tenderness. Left Sinus: No maxillary sinus tenderness or frontal sinus tenderness. Mouth/Throat:      Mouth: Mucous membranes are moist.      Pharynx: No oropharyngeal exudate or posterior oropharyngeal erythema. Tonsils: No tonsillar exudate.    Eyes: General:         Right eye: No discharge. Left eye: No discharge. Conjunctiva/sclera: Conjunctivae normal.   Neck:      Musculoskeletal: Normal range of motion and neck supple. Cardiovascular:      Rate and Rhythm: Regular rhythm. Tachycardia present. Heart sounds: No murmur. Pulmonary:      Effort: Pulmonary effort is normal. No tachypnea or respiratory distress. Breath sounds: Normal breath sounds. No wheezing, rhonchi or rales. Abdominal:      General: There is no distension. Tenderness: There is no right CVA tenderness or left CVA tenderness. Skin:     General: Skin is warm. Neurological:      General: No focal deficit present. Mental Status: She is oriented to person, place, and time. Psychiatric:         Mood and Affect: Mood normal.         Behavior: Behavior normal.       BP (!) 124/91 (Site: Right Upper Arm, Position: Sitting, Cuff Size: Large Adult)   Pulse 111   Temp 98 °F (36.7 °C) (Temporal)   Ht 5' 3\" (1.6 m)   Wt 210 lb 12.8 oz (95.6 kg)   LMP 02/10/2020   SpO2 99%   BMI 37.34 kg/m²     Assessment:      Diagnosis Orders   1. Flu-like symptoms  benzonatate (TESSALON) 200 MG capsule    oseltamivir (TAMIFLU) 75 MG capsule   2. Cough  POCT Influenza A/B   3. Fever, unspecified fever cause  POCT Urinalysis No Micro (Auto)    Culture, Urine     Patient presents with cough and fever x1 day. Influenza testing negative. She denies any international travel has not had any contact with anyone who is tested positive for COVID 19. She denies any dyspnea or wheezing. Based off her symptoms I think it is reasonable to treat her for possible influenza with Tamiflu. She does report mild dysuria and increasing frequency  She denies any flank pain, nausea, hematuria or urgency. She was recently treated for presumptive pyelonephritis with keflex and ceftriaxone however urine culture was never obtained. She is unable to provide a sample in the office.   She was given a cup and her  will bring this back later today for urinalysis. Update-1645-urine specimen provided,   Positive for leukocytes and blood. We will send this out for culture and start her on ciprofloxacin   For possible complicated UTI. Her main reason for coming here today was due to a cough and fever I will have her continue Tamiflu. Plan:       Discussed exam, POCT findings, plan of care (including prescriptive and supportive as listed below) and follow-up atlength with patient. Reviewed all prescribed and recommended medications, administration and side effects. Encouraged to return to [address] [place] [address] for noimprovement and or worsening of symptoms. To ER or call 911 if any difficulty breathing, shortness of breath, inability to swallow, hives or temp greater than 103 degrees. Questions answered. They verbalized understandingand agreement. Return if symptoms worsen or fail to improve. Orders Placed This Encounter   Medications    benzonatate (TESSALON) 200 MG capsule     Sig: Take 1 capsule by mouth 3 times daily as needed for Cough     Dispense:  30 capsule     Refill:  0    oseltamivir (TAMIFLU) 75 MG capsule     Sig: Take 1 capsule by mouth 2 times daily for 5 days     Dispense:  10 capsule     Refill:  0    ciprofloxacin (CIPRO) 500 MG tablet     Sig: Take 1 tablet by mouth 2 times daily for 7 days     Dispense:  14 tablet     Refill:  0          All patient questions answered. Pt voiced understanding.       Electronically signed by RONN Ryder CNP on 3/19/2020 at 4:46 PM

## 2020-03-20 ENCOUNTER — HOSPITAL ENCOUNTER (OUTPATIENT)
Age: 45
Setting detail: SPECIMEN
Discharge: HOME | End: 2020-03-20
Payer: COMMERCIAL

## 2020-03-20 PROCEDURE — 87077 CULTURE AEROBIC IDENTIFY: CPT

## 2020-03-20 PROCEDURE — 87086 URINE CULTURE/COLONY COUNT: CPT

## 2020-03-20 PROCEDURE — 87186 SC STD MICRODIL/AGAR DIL: CPT

## 2020-03-23 RX ORDER — OMEPRAZOLE 20 MG/1
20 CAPSULE, DELAYED RELEASE ORAL DAILY
Qty: 30 CAPSULE | Refills: 2 | Status: SHIPPED | OUTPATIENT
Start: 2020-03-23 | End: 2020-09-28

## 2020-03-23 NOTE — TELEPHONE ENCOUNTER
Called patient and left message informing her that Kye decreased her dose to 20mg and to contact us if she starts getting heartburn again. To call office with any questions.

## 2020-03-24 LAB
CULTURE: ABNORMAL
Lab: ABNORMAL
SPECIMEN DESCRIPTION: ABNORMAL

## 2020-04-27 RX ORDER — GLIMEPIRIDE 1 MG/1
TABLET ORAL
Qty: 30 TABLET | Refills: 0 | Status: SHIPPED | OUTPATIENT
Start: 2020-04-27 | End: 2020-08-17 | Stop reason: SDUPTHER

## 2020-08-17 ENCOUNTER — OFFICE VISIT (OUTPATIENT)
Dept: PRIMARY CARE CLINIC | Age: 45
End: 2020-08-17
Payer: COMMERCIAL

## 2020-08-17 VITALS
RESPIRATION RATE: 16 BRPM | WEIGHT: 212.8 LBS | SYSTOLIC BLOOD PRESSURE: 104 MMHG | TEMPERATURE: 97.8 F | BODY MASS INDEX: 39.16 KG/M2 | HEIGHT: 62 IN | HEART RATE: 78 BPM | DIASTOLIC BLOOD PRESSURE: 69 MMHG

## 2020-08-17 LAB — HBA1C MFR BLD: 8.8 %

## 2020-08-17 PROCEDURE — 3052F HG A1C>EQUAL 8.0%<EQUAL 9.0%: CPT | Performed by: NURSE PRACTITIONER

## 2020-08-17 PROCEDURE — 83036 HEMOGLOBIN GLYCOSYLATED A1C: CPT | Performed by: NURSE PRACTITIONER

## 2020-08-17 PROCEDURE — 96372 THER/PROPH/DIAG INJ SC/IM: CPT | Performed by: NURSE PRACTITIONER

## 2020-08-17 PROCEDURE — 99214 OFFICE O/P EST MOD 30 MIN: CPT | Performed by: NURSE PRACTITIONER

## 2020-08-17 RX ORDER — GLIMEPIRIDE 4 MG/1
4 TABLET ORAL
Qty: 90 TABLET | Refills: 3 | Status: SHIPPED | OUTPATIENT
Start: 2020-08-17 | End: 2021-08-12 | Stop reason: SDUPTHER

## 2020-08-17 RX ORDER — ATORVASTATIN CALCIUM 40 MG/1
40 TABLET, FILM COATED ORAL DAILY
Qty: 90 TABLET | Refills: 3 | Status: SHIPPED | OUTPATIENT
Start: 2020-08-17 | End: 2022-05-25 | Stop reason: SDUPTHER

## 2020-08-17 RX ORDER — TRIAMCINOLONE ACETONIDE 40 MG/ML
40 INJECTION, SUSPENSION INTRA-ARTICULAR; INTRAMUSCULAR ONCE
Status: COMPLETED | OUTPATIENT
Start: 2020-08-17 | End: 2020-08-17

## 2020-08-17 RX ADMIN — TRIAMCINOLONE ACETONIDE 40 MG: 40 INJECTION, SUSPENSION INTRA-ARTICULAR; INTRAMUSCULAR at 08:25

## 2020-08-17 ASSESSMENT — PATIENT HEALTH QUESTIONNAIRE - PHQ9
SUM OF ALL RESPONSES TO PHQ QUESTIONS 1-9: 0
1. LITTLE INTEREST OR PLEASURE IN DOING THINGS: 0
2. FEELING DOWN, DEPRESSED OR HOPELESS: 0
SUM OF ALL RESPONSES TO PHQ QUESTIONS 1-9: 0
SUM OF ALL RESPONSES TO PHQ9 QUESTIONS 1 & 2: 0

## 2020-08-17 ASSESSMENT — ENCOUNTER SYMPTOMS
WHEEZING: 0
COUGH: 1
BELCHING: 0
SORE THROAT: 0
SINUS PAIN: 0
VISUAL CHANGE: 0
ABDOMINAL PAIN: 0
STRIDOR: 0
CONSTIPATION: 0
HOARSE VOICE: 0
HEARTBURN: 0
RHINORRHEA: 1
SHORTNESS OF BREATH: 0
DIARRHEA: 0
VOMITING: 0
GLOBUS SENSATION: 0
NAUSEA: 0
SWOLLEN GLANDS: 0

## 2020-08-17 NOTE — PATIENT INSTRUCTIONS
SURVEY:    You may be receiving a survey from [employer] regarding your visit today. Please complete the survey to enable us to provide the highest quality of care to you and your family. If you cannot score us a very good on any question, please call the office to discuss how we could have made your experience a very good one. Thank you. Patient Education        Diabetes Foot Health: Care Instructions  Your Care Instructions     When you have diabetes, your feet need extra care and attention. Diabetes can damage the nerve endings and blood vessels in your feet, making you less likely to notice when your feet are injured. Diabetes also limits your body's ability to fight infection and get blood to areas that need it. If you get a minor foot injury, it could become an ulcer or a serious infection. With good foot care, you can prevent most of these problems. Caring for your feet can be quick and easy. Most of the care can be done when you are bathing or getting ready for bed. Follow-up care is a key part of your treatment and safety. Be sure to make and go to all appointments, and call your doctor if you are having problems. It's also a good idea to know your test results and keep a list of the medicines you take. How can you care for yourself at home? · Keep your blood sugar close to normal by watching what and how much you eat, monitoring blood sugar, taking medicines if prescribed, and getting regular exercise. · Do not smoke. Smoking affects blood flow and can make foot problems worse. If you need help quitting, talk to your doctor about stop-smoking programs and medicines. These can increase your chances of quitting for good. · Eat a diet that is low in fats. High fat intake can cause fat to build up in your blood vessels and decrease blood flow. · Inspect your feet daily for blisters, cuts, cracks, or sores. If you cannot see well, use a mirror or have someone help you.   · Take care of your

## 2020-08-17 NOTE — PROGRESS NOTES
After obtaining consent, and per orders of Dr. Vesta Lindsay, injection of Kenaolog given in Left upper quad. gluteus by Herbert Price. Patient instructed to remain in clinic for 20 minutes afterwards, and to report any adverse reaction to me immediately.

## 2020-08-17 NOTE — PROGRESS NOTES
Gastroesophageal Reflux   She complains of coughing. She reports no abdominal pain, no belching, no chest pain, no globus sensation, no heartburn, no hoarse voice, no nausea, no sore throat, no stridor, no tooth decay or no wheezing. This is a new problem. The current episode started more than 1 year ago. The problem occurs rarely. The problem has been unchanged. The symptoms are aggravated by certain foods, caffeine and smoking. Pertinent negatives include no fatigue, melena or weight loss. Risk factors include caffeine use, lack of exercise, smoking/tobacco exposure and obesity. She has tried a PPI for the symptoms. The treatment provided significant relief. Past procedures do not include an EGD or a UGI. Past invasive treatments do not include gastroplasty, gastroplication or reflux surgery. URI    This is a chronic problem. The current episode started more than 1 year ago. The problem has been unchanged. There has been no fever. Associated symptoms include congestion, coughing, rhinorrhea and sneezing. Pertinent negatives include no abdominal pain, chest pain, diarrhea, dysuria, ear pain, headaches, joint pain, joint swelling, nausea, neck pain, plugged ear sensation, rash, sinus pain, sore throat, swollen glands, vomiting or wheezing. She has tried nothing for the symptoms. The treatment provided no relief. Past Medical History:     Past Medical History:   Diagnosis Date    Anxiety 2007    Depression 1993    GERD (gastroesophageal reflux disease) 2010    H/O cardiovascular stress test 07/02/2018    Largerly normal myocardial perfusion imaging with soft tissue artifact but without evidence of signinfcant myocardial ischemia or infarction. EF 75%. The pt duke treadmill score is 6.    H/O echocardiogram 07/02/2018    EF >70%. The LV wallt thickness is mildly incrased.  H/O tilt table evaluation 05/24/2018    Borderline abnormal head upright tilt table study.  Although the pt heart rate, blood pressure and symptoms were suggestive of orthostatic intolerance. Therefore, if clinically suspicion remains high, a trial of empiric trreatment and/or re-testing may be indicated.  Hyperlipidemia 2010    Hypertension 2009    Kidney dysfunction     pt only has one kidney    Obesity 1999    Type 2 diabetes mellitus without complication ([facility] [address] [id number]      Reviewed all health maintenance requirements and ordered appropriate tests  Health Maintenance Due   Topic Date Due    Diabetic foot exam  05/17/2019       Past Surgical History:     Past Surgical History:   Procedure Laterality Date    BLADDER SUSPENSION      CARPAL TUNNEL RELEASE      CHOLECYSTECTOMY      DILATION AND CURETTAGE OF UTERUS  2007    KIDNEY REMOVAL      left        Medications:       Prior to Admission medications    Medication Sig Start Date End Date Taking?  Authorizing Provider   atorvastatin (LIPITOR) 40 MG tablet Take 1 tablet by mouth daily 8/17/20  Yes RONN Chun CNP   glimepiride (AMARYL) 4 MG tablet Take 1 tablet by mouth every morning (before breakfast) 8/17/20  Yes RONN Chun CNP   omeprazole (PRILOSEC) 20 MG delayed release capsule Take 1 capsule by mouth daily 3/23/20  Yes RONN Hawkins CNP   lisinopril (PRINIVIL;ZESTRIL) 2.5 MG tablet TAKE 1 TABLET BY MOUTH EVERY DAY 2/17/20  Yes RONN Chun CNP   ASPIRIN LOW DOSE 81 MG EC tablet TAKE 1 TABLET BY MOUTH EVERY DAY 2/17/20  Yes RONN Chun CNP   metFORMIN (GLUCOPHAGE) 1000 MG tablet TAKE 1 TABLET BY MOUTH TWICE A DAY WITH MEALS 2/17/20  Yes RONN Chun CNP   JANUVIA 100 MG tablet TAKE 1 TABLET BY MOUTH EVERY DAY 1/17/20  Yes RONN Hawkins CNP   albuterol sulfate  (90 Base) MCG/ACT inhaler Inhale 2 puffs into the lungs 4 times daily as needed for Wheezing or Shortness of Breath 12/31/19  Yes RONN Chun CNP   metoprolol succinate (TOPROL XL) 25 MG extended release tablet TAKE 1/2 TABLET BY MOUTH ONCE DAILY 10/1/19  Yes Lucinda Garcia MD   ibuprofen (ADVIL;MOTRIN) 200 MG tablet Take 800 mg by mouth every 6 hours as needed for Pain   Yes Historical Provider, MD   CRANBERRY-VITAMIN C PO Take by mouth daily    Yes Historical Provider, MD        Allergies:       Pcn [penicillins]    Social History:     Tobacco:    reports that she has been smoking cigarettes. She has a 25.00 pack-year smoking history. She has never used smokeless tobacco.  Alcohol:      reports no history of alcohol use. Drug Use:  reports no history of drug use. Family History:     Family History   Problem Relation Age of Onset    High Cholesterol Mother     Other Brother     Alcohol Abuse Maternal Grandmother     Cancer Maternal Grandmother     Depression Maternal Grandmother     Diabetes Maternal Grandmother     Heart Disease Maternal Grandmother     High Blood Pressure Maternal Grandmother     High Cholesterol Maternal Grandmother     Mental Illness Maternal Grandmother     Obesity Maternal Grandmother     Other Maternal Grandfather        Review of Systems:     Positive and Negative as described in HPI    Review of Systems   Constitutional: Negative for chills, fatigue, fever and weight loss. HENT: Positive for congestion, rhinorrhea and sneezing. Negative for ear pain, hoarse voice, sinus pain and sore throat. Eyes: Negative for visual disturbance. Respiratory: Positive for cough. Negative for shortness of breath and wheezing. Cardiovascular: Negative for chest pain and palpitations. Gastrointestinal: Negative for abdominal pain, constipation, diarrhea, heartburn, melena, nausea and vomiting. Endocrine: Negative for polydipsia, polyphagia and polyuria. Genitourinary: Negative for difficulty urinating and dysuria. Musculoskeletal: Negative for gait problem, joint pain, neck pain and neck stiffness. Skin: Negative for rash.    Neurological: Negative for dizziness, tremors, syncope, weakness, light-headedness and headaches. Psychiatric/Behavioral: The patient is not nervous/anxious. Physical Exam:   Vitals:  /69 (Site: Left Upper Arm, Position: Sitting, Cuff Size: Large Adult)   Pulse 78   Temp 97.8 °F (36.6 °C) (Temporal)   Resp 16   Ht 5' 2\" (1.575 m)   Wt 212 lb 12.8 oz (96.5 kg)   LMP 08/10/2020 (Approximate)   BMI 38.92 kg/m²     Physical Exam  Vitals signs and nursing note reviewed. Constitutional:       General: She is not in acute distress. Appearance: Normal appearance. She is well-developed. She is obese. She is not ill-appearing. HENT:      Right Ear: Tympanic membrane normal.      Left Ear: Tympanic membrane normal.      Nose: Mucosal edema and congestion present. Right Turbinates: Swollen and pale. Left Turbinates: Swollen and pale. Mouth/Throat:      Mouth: Mucous membranes are not dry. Pharynx: Posterior oropharyngeal erythema present. Eyes:      General: No scleral icterus. Conjunctiva/sclera: Conjunctivae normal.   Neck:      Musculoskeletal: Normal range of motion and neck supple. Cardiovascular:      Rate and Rhythm: Normal rate and regular rhythm. Heart sounds: No murmur. Pulmonary:      Effort: Pulmonary effort is normal.      Breath sounds: Normal breath sounds. No wheezing. Abdominal:      General: Bowel sounds are normal. There is no distension. Palpations: Abdomen is soft. Tenderness: There is no abdominal tenderness. Musculoskeletal:      Right lower leg: No edema. Left lower leg: No edema. Right foot: Normal range of motion. No deformity. Left foot: Normal range of motion. No deformity. Feet:      Right foot:      Protective Sensation: 5 sites tested. 5 sites sensed. Skin integrity: Skin integrity normal.      Toenail Condition: Right toenails are normal.      Left foot:      Protective Sensation: 5 sites tested. 5 sites sensed.       Skin integrity: Skin integrity normal. Toenail Condition: Left toenails are normal.   Lymphadenopathy:      Cervical: No cervical adenopathy. Skin:     General: Skin is warm and dry. Findings: No rash. Neurological:      Mental Status: She is alert and oriented to person, place, and time. Psychiatric:         Mood and Affect: Mood normal.         Behavior: Behavior normal.         Data:     Lab Results   Component Value Date     02/24/2020    K 4.3 02/24/2020    CL 98 02/24/2020    CO2 23 02/24/2020    BUN 13 02/24/2020    CREATININE 0.61 02/24/2020    GLUCOSE 290 02/24/2020    PROT 7.0 02/24/2020    LABALBU 3.9 02/24/2020    BILITOT 0.25 02/24/2020    ALKPHOS 77 02/24/2020    AST 18 02/24/2020    ALT 20 02/24/2020     Lab Results   Component Value Date    WBC 9.9 02/24/2020    RBC 4.73 02/24/2020    HGB 15.2 02/24/2020    HCT 45.9 02/24/2020    MCV 97.0 02/24/2020    MCH 32.1 02/24/2020    MCHC 33.1 02/24/2020    RDW 12.3 02/24/2020     02/24/2020    MPV 10.1 02/24/2020     Lab Results   Component Value Date    TSH 4.10 09/26/2019     Lab Results   Component Value Date    CHOL 182 09/28/2019    HDL 29 09/28/2019    LABA1C 8.8 08/17/2020       Assessment/Plan:      Diagnosis Orders   1. Type 2 diabetes mellitus without complication, without long-term current use of insulin ([facility])  atorvastatin (LIPITOR) 40 MG tablet    POCT glycosylated hemoglobin (Hb A1C)     DIABETES FOOT EXAM    CBC Auto Differential    Lipid Panel    Hemoglobin A1C    Microalbumin, Ur    glimepiride (AMARYL) 4 MG tablet    ALT    AST    Basic Metabolic Panel   2. Gastroesophageal reflux disease, esophagitis presence not specified     3. Dysthymia     4. Seasonal allergic rhinitis due to pollen  triamcinolone acetonide (KENALOG-40) injection 40 mg     We will increase glimepiride to 4 mg daily with breakfast.  Continue all other medications. Kenalog in the office today for seasonal allergic rhinitis.     1.  Elida received counseling on the following

## 2020-10-14 ENCOUNTER — E-VISIT (OUTPATIENT)
Dept: PRIMARY CARE CLINIC | Age: 45
End: 2020-10-14
Payer: COMMERCIAL

## 2020-10-14 PROCEDURE — 98966 PH1 ASSMT&MGMT NQHP 5-10: CPT | Performed by: NURSE PRACTITIONER

## 2020-10-14 RX ORDER — DOXYCYCLINE HYCLATE 100 MG
100 TABLET ORAL 2 TIMES DAILY
Qty: 14 TABLET | Refills: 0 | Status: SHIPPED | OUTPATIENT
Start: 2020-10-14 | End: 2020-10-21

## 2020-10-14 RX ORDER — FLUTICASONE PROPIONATE 50 MCG
1 SPRAY, SUSPENSION (ML) NASAL DAILY
Qty: 1 BOTTLE | Refills: 0 | Status: SHIPPED | OUTPATIENT
Start: 2020-10-14 | End: 2022-10-31

## 2020-10-14 ASSESSMENT — LIFESTYLE VARIABLES
SMOKING_YEARS: 30
SMOKING_STATUS: YES

## 2020-10-14 NOTE — PROGRESS NOTES
We will treat for acute bacterial sinusitis with Flonase and doxycycline given her history of penicillin allergy.

## 2020-10-20 ENCOUNTER — TELEPHONE (OUTPATIENT)
Dept: PRIMARY CARE CLINIC | Age: 45
End: 2020-10-20

## 2021-02-03 DIAGNOSIS — E11.9 TYPE 2 DIABETES MELLITUS WITHOUT COMPLICATION, WITHOUT LONG-TERM CURRENT USE OF INSULIN (HCC): ICD-10-CM

## 2021-02-03 NOTE — TELEPHONE ENCOUNTER
Health Maintenance   Topic Date Due    Hepatitis C screen  1975    Flu vaccine (1) 09/01/2020    Diabetic microalbuminuria test  09/28/2020    Lipid screen  09/28/2020    Hepatitis B vaccine (1 of 3 - Risk 3-dose series) 08/17/2021 (Originally 7/9/1996)    DTaP/Tdap/Td vaccine (1 - Tdap) 08/17/2021 (Originally 7/9/1996)    Cervical cancer screen  08/17/2021 (Originally 7/9/1998)    HIV screen  08/17/2021 (Originally 7/9/1992)    Potassium monitoring  02/24/2021    Creatinine monitoring  02/24/2021    Diabetic foot exam  08/17/2021    A1C test (Diabetic or Prediabetic)  08/17/2021    Diabetic retinal exam  08/17/2021    Pneumococcal 0-64 years Vaccine  Completed    Hepatitis A vaccine  Aged Out    Hib vaccine  Aged Out    Meningococcal (ACWY) vaccine  Aged Out             (applicable per patient's age: Cancer Screenings, Depression Screening, Fall Risk Screening, Immunizations)    Hemoglobin A1C (%)   Date Value   08/17/2020 8.8   09/28/2019 7.0 (H)   08/19/2019 7.4     Microalb/Crt.  Ratio (mcg/mg creat)   Date Value   09/28/2019 CANNOT BE CALCULATED     LDL Cholesterol (mg/dL)   Date Value   09/28/2019 85     AST (U/L)   Date Value   02/24/2020 18     ALT (U/L)   Date Value   02/24/2020 20     BUN (mg/dL)   Date Value   02/24/2020 13      (goal A1C is < 7)   (goal LDL is <100) need 30-50% reduction from baseline     BP Readings from Last 3 Encounters:   08/17/20 104/69   03/19/20 (!) 124/91   02/24/20 97/72    (goal /80)      All Future Testing planned in [facility]:  Lab Frequency Next Occurrence   CBC Auto Differential Once 02/13/2021   Lipid Panel Once 02/13/2021   Hemoglobin A1C Once 02/13/2021   Microalbumin, Ur Once 02/13/2021   ALT Once 02/17/2021   AST Once 13/81/3287   Basic Metabolic Panel Once 09/33/7659       Next Visit Date:  Future Appointments   Date Time Provider Daryl Guillaume   2/17/2021  8:40 AM Kely Else Might, APRN - CNP [facility]            Patient Active Problem List:     Type 2 diabetes mellitus without complication, without long-term current use of insulin (HCC)     Tobacco abuse     Single kidney     Hyperlipidemia     Syncope     PCOS (polycystic ovarian syndrome)     Gastroesophageal reflux disease     Leukocytosis     Abscess     Dysthymia     Class 2 obesity with body mass index (BMI) of 37.0 to 37.9 in adult

## 2021-02-10 ENCOUNTER — TELEPHONE (OUTPATIENT)
Dept: PRIMARY CARE CLINIC | Age: 46
End: 2021-02-10

## 2021-02-17 ENCOUNTER — OFFICE VISIT (OUTPATIENT)
Dept: PRIMARY CARE CLINIC | Age: 46
End: 2021-02-17
Payer: COMMERCIAL

## 2021-02-17 ENCOUNTER — HOSPITAL ENCOUNTER (OUTPATIENT)
Age: 46
Setting detail: SPECIMEN
Discharge: HOME | End: 2021-02-17
Payer: COMMERCIAL

## 2021-02-17 VITALS
TEMPERATURE: 98.4 F | HEART RATE: 82 BPM | DIASTOLIC BLOOD PRESSURE: 80 MMHG | SYSTOLIC BLOOD PRESSURE: 134 MMHG | RESPIRATION RATE: 20 BRPM | WEIGHT: 213.7 LBS | BODY MASS INDEX: 39.09 KG/M2

## 2021-02-17 DIAGNOSIS — E78.2 MIXED HYPERLIPIDEMIA: ICD-10-CM

## 2021-02-17 DIAGNOSIS — R10.9 RIGHT FLANK PAIN: ICD-10-CM

## 2021-02-17 DIAGNOSIS — E11.9 TYPE 2 DIABETES MELLITUS WITHOUT COMPLICATION, WITHOUT LONG-TERM CURRENT USE OF INSULIN (HCC): ICD-10-CM

## 2021-02-17 DIAGNOSIS — J30.9 CHRONIC ALLERGIC RHINITIS: ICD-10-CM

## 2021-02-17 DIAGNOSIS — E11.9 TYPE 2 DIABETES MELLITUS WITHOUT COMPLICATION, WITHOUT LONG-TERM CURRENT USE OF INSULIN (HCC): Primary | ICD-10-CM

## 2021-02-17 LAB
BILIRUBIN, POC: NORMAL
BLOOD URINE, POC: NORMAL
CLARITY, POC: CLEAR
COLOR, POC: YELLOW
GLUCOSE URINE, POC: NORMAL
HBA1C MFR BLD: 8.2 %
KETONES, POC: NORMAL
LEUKOCYTE EST, POC: NORMAL
NITRITE, POC: NORMAL
PH, POC: 7
PROTEIN, POC: NORMAL
SPECIFIC GRAVITY, POC: 1.02
UROBILINOGEN, POC: 1

## 2021-02-17 PROCEDURE — 81002 URINALYSIS NONAUTO W/O SCOPE: CPT | Performed by: NURSE PRACTITIONER

## 2021-02-17 PROCEDURE — 99214 OFFICE O/P EST MOD 30 MIN: CPT | Performed by: NURSE PRACTITIONER

## 2021-02-17 PROCEDURE — 87086 URINE CULTURE/COLONY COUNT: CPT

## 2021-02-17 PROCEDURE — 83036 HEMOGLOBIN GLYCOSYLATED A1C: CPT | Performed by: NURSE PRACTITIONER

## 2021-02-17 PROCEDURE — 87186 SC STD MICRODIL/AGAR DIL: CPT

## 2021-02-17 PROCEDURE — 87077 CULTURE AEROBIC IDENTIFY: CPT

## 2021-02-17 PROCEDURE — 3052F HG A1C>EQUAL 8.0%<EQUAL 9.0%: CPT | Performed by: NURSE PRACTITIONER

## 2021-02-17 RX ORDER — ASPIRIN 81 MG/1
TABLET ORAL
Qty: 100 TABLET | Refills: 1 | Status: SHIPPED | OUTPATIENT
Start: 2021-02-17 | End: 2021-08-30

## 2021-02-17 RX ORDER — LISINOPRIL 2.5 MG/1
TABLET ORAL
Qty: 90 TABLET | Refills: 1 | Status: SHIPPED | OUTPATIENT
Start: 2021-02-17 | End: 2021-08-12 | Stop reason: SDUPTHER

## 2021-02-17 RX ORDER — MONTELUKAST SODIUM 10 MG/1
10 TABLET ORAL DAILY
Qty: 90 TABLET | Refills: 0 | Status: SHIPPED | OUTPATIENT
Start: 2021-02-17 | End: 2021-06-01

## 2021-02-17 RX ORDER — METOPROLOL SUCCINATE 25 MG/1
TABLET, EXTENDED RELEASE ORAL
Qty: 45 TABLET | Refills: 1 | Status: SHIPPED | OUTPATIENT
Start: 2021-02-17 | End: 2021-08-12 | Stop reason: SDUPTHER

## 2021-02-17 ASSESSMENT — ENCOUNTER SYMPTOMS
SINUS PRESSURE: 1
NAUSEA: 0
SORE THROAT: 0
COUGH: 1
SHORTNESS OF BREATH: 0
SINUS PAIN: 0
RHINORRHEA: 1
ABDOMINAL PAIN: 0
DIARRHEA: 0
WHEEZING: 0
SINUS COMPLAINT: 1
CONSTIPATION: 0
BLURRED VISION: 0
VOMITING: 0

## 2021-02-17 ASSESSMENT — PATIENT HEALTH QUESTIONNAIRE - PHQ9
SUM OF ALL RESPONSES TO PHQ QUESTIONS 1-9: 0
2. FEELING DOWN, DEPRESSED OR HOPELESS: 0
SUM OF ALL RESPONSES TO PHQ QUESTIONS 1-9: 0

## 2021-02-17 NOTE — PROGRESS NOTES
Name: Manson Brunner  : 1975         Chief Complaint:     Chief Complaint   Patient presents with    Diabetes     routine check    Hyperlipidemia    Hypertension       History of Present Illness:      Manson Brunner is a 39 y.o.  female who presents with Diabetes (routine check), Hyperlipidemia, and Hypertension      Carin Rosario is here for a routine follow up visit    Prior to visit patient did not have routine lab work done. A1C in office was 8.2. She reports she has had fullness in  left side of face and ear. She also reports she thinks she passed a kidney stone due to dull pain in left flank. Diabetes  She presents for her follow-up diabetic visit. She has type 2 diabetes mellitus. Her disease course has been stable. Pertinent negatives for hypoglycemia include no dizziness, headaches, mood changes, nervousness/anxiousness, seizures, speech difficulty or tremors. Pertinent negatives for diabetes include no blurred vision, no chest pain, no fatigue, no foot paresthesias, no foot ulcerations, no polydipsia, no polyphagia, no polyuria, no weakness and no weight loss. Pertinent negatives for hypoglycemia complications include no blackouts and no required assistance. Symptoms are stable. Pertinent negatives for diabetic complications include no CVA, nephropathy or peripheral neuropathy. Risk factors for coronary artery disease include diabetes mellitus, dyslipidemia, obesity, hypertension and sedentary lifestyle. Current diabetic treatment includes oral agent (monotherapy). She is compliant with treatment some of the time. Her weight is stable. She is following a generally healthy diet. When asked about meal planning, she reported none. She has had a previous visit with a dietitian. She rarely participates in exercise. There is no change in her home blood glucose trend. Her breakfast blood glucose range is generally 180-200 mg/dl.  An ACE inhibitor/angiotensin II receptor blocker is being taken. She does not see a podiatrist.Eye exam is current. Hyperlipidemia  This is a chronic problem. The current episode started more than 1 year ago. The problem is controlled. Recent lipid tests were reviewed and are variable. Exacerbating diseases include diabetes and obesity. She has no history of hypothyroidism or liver disease. Factors aggravating her hyperlipidemia include fatty foods. Pertinent negatives include no chest pain, leg pain, myalgias or shortness of breath. Current antihyperlipidemic treatment includes statins. The current treatment provides moderate improvement of lipids. Compliance problems include adherence to exercise and adherence to diet. Risk factors for coronary artery disease include diabetes mellitus, dyslipidemia, hypertension and a sedentary lifestyle. Sinus Problem  This is a chronic problem. The current episode started more than 1 year ago. The problem has been waxing and waning since onset. There has been no fever. Her pain is at a severity of 2/10. The pain is mild. Associated symptoms include congestion, coughing, sinus pressure and sneezing. Pertinent negatives include no chills, ear pain, headaches, neck pain, shortness of breath or sore throat. Past treatments include nothing. The treatment provided no relief. Flank Pain  This is a new problem. The current episode started in the past 7 days. The problem occurs intermittently. The problem has been resolved since onset. The quality of the pain is described as aching. The pain does not radiate. The pain is at a severity of 7/10. The pain is severe. Pertinent negatives include no abdominal pain, chest pain, dysuria, fever, headaches, leg pain, weakness or weight loss. She has tried bed rest for the symptoms. The treatment provided significant relief.          Past Medical History:     Past Medical History:   Diagnosis Date    Anxiety 2007    Depression 1993    GERD (gastroesophageal reflux disease) 2010    H/O cardiovascular stress test 07/02/2018    Largerly normal myocardial perfusion imaging with soft tissue artifact but without evidence of signinfcant myocardial ischemia or infarction. EF 75%. The pt duke treadmill score is 6.    H/O echocardiogram 07/02/2018    EF >70%. The LV wallt thickness is mildly incrased.  H/O tilt table evaluation 05/24/2018    Borderline abnormal head upright tilt table study. Although the pt heart rate, blood pressure and symptoms were suggestive of orthostatic intolerance. Therefore, if clinically suspicion remains high, a trial of empiric trreatment and/or re-testing may be indicated.  Hyperlipidemia 2010    Hypertension 2009    Kidney dysfunction     pt only has one kidney    Obesity 1999    [facility] 8872      Reviewed all health maintenance requirements and ordered appropriate tests  Health Maintenance Due   Topic Date Due    Diabetic microalbuminuria test  09/28/2020    Lipid screen  09/28/2020    Potassium monitoring  02/24/2021    Creatinine monitoring  02/24/2021       Past Surgical History:     Past Surgical History:   Procedure Laterality Date    BLADDER SUSPENSION      CARPAL TUNNEL RELEASE      CHOLECYSTECTOMY      DILATION AND CURETTAGE OF UTERUS  2007    KIDNEY REMOVAL      left        Medications:       Prior to Admission medications    Medication Sig Start Date End Date Taking?  Authorizing Provider   metoprolol succinate (TOPROL XL) 25 MG extended release tablet TAKE 1/2 TABLET BY MOUTH ONCE DAILY 2/17/21  Yes RONN Miles CNP   lisinopril (PRINIVIL;ZESTRIL) 2.5 MG tablet TAKE 1 TABLET BY MOUTH EVERY DAY 2/17/21  Yes RONN Miles CNP   aspirin (ASPIRIN LOW DOSE) 81 MG EC tablet TAKE 1 TABLET BY MOUTH EVERY DAY 2/17/21  Yes RONN Miles CNP   metFORMIN (GLUCOPHAGE) 1000 MG tablet TAKE 1 TABLET BY MOUTH TWICE A DAY WITH MEALS 2/17/21  Yes RONN Miles CNP   montelukast (SINGULAIR) 10 MG tablet Take 1 tablet by mouth daily 2/17/21  Yes RONN Stout CNP   SITagliptin (JANUVIA) 100 MG tablet TAKE 1 TABLET BY MOUTH EVERY DAY 2/3/21  Yes [de-identified] M RONN Gamez CNP   omeprazole (PRILOSEC) 20 MG delayed release capsule TAKE 1 CAPSULE BY MOUTH EVERY DAY 9/28/20  Yes RONN Stout CNP   atorvastatin (LIPITOR) 40 MG tablet Take 1 tablet by mouth daily 8/17/20  Yes RONN Stout CNP   glimepiride (AMARYL) 4 MG tablet Take 1 tablet by mouth every morning (before breakfast) 8/17/20  Yes RONN Stout CNP   ibuprofen (ADVIL;MOTRIN) 200 MG tablet Take 800 mg by mouth every 6 hours as needed for Pain   Yes Historical Provider, MD   CRANBERRY-VITAMIN C PO Take by mouth daily    Yes Historical Provider, MD   fluticasone (FLONASE) 50 MCG/ACT nasal spray 1 spray by Each Nostril route daily  Patient not taking: Reported on 2/17/2021 10/14/20   RONN Hawkins CNP   albuterol sulfate  (90 Base) MCG/ACT inhaler Inhale 2 puffs into the lungs 4 times daily as needed for Wheezing or Shortness of Breath  Patient not taking: Reported on 2/17/2021 12/31/19   RONN Stout CNP        Allergies:       Pcn [penicillins]    Social History:     Tobacco:    reports that she has been smoking cigarettes. She has a 25.00 pack-year smoking history. She has never used smokeless tobacco.  Alcohol:      reports no history of alcohol use. Drug Use:  reports no history of drug use.     Family History:     Family History   Problem Relation Age of Onset    High Cholesterol Mother     Other Brother     Alcohol Abuse Maternal Grandmother     Cancer Maternal Grandmother     Depression Maternal Grandmother     Diabetes Maternal Grandmother     Heart Disease Maternal Grandmother     High Blood Pressure Maternal Grandmother     High Cholesterol Maternal Grandmother     Mental Illness Maternal Grandmother     Obesity Maternal Grandmother     Other Maternal Grandfather Review of Systems:     Positive and Negative as described in HPI    Review of Systems   Constitutional: Negative for chills, fatigue, fever and weight loss. HENT: Positive for congestion, rhinorrhea, sinus pressure and sneezing. Negative for ear pain, sinus pain and sore throat. Eyes: Negative for blurred vision and visual disturbance. Respiratory: Positive for cough. Negative for shortness of breath and wheezing. Cardiovascular: Negative for chest pain and palpitations. Gastrointestinal: Negative for abdominal pain, constipation, diarrhea, nausea and vomiting. Endocrine: Negative for polydipsia, polyphagia and polyuria. Genitourinary: Positive for flank pain. Negative for difficulty urinating and dysuria. Musculoskeletal: Negative for gait problem, myalgias, neck pain and neck stiffness. Skin: Negative for rash. Neurological: Negative for dizziness, tremors, seizures, syncope, speech difficulty, weakness, light-headedness and headaches. Psychiatric/Behavioral: The patient is not nervous/anxious. Physical Exam:   Vitals:  /80 (Site: Left Upper Arm)   Pulse 82   Temp 98.4 °F (36.9 °C) (Temporal)   Resp 20   Wt 213 lb 11.2 oz (96.9 kg)   BMI 39.09 kg/m²     Physical Exam  Vitals signs and nursing note reviewed. Constitutional:       General: She is not in acute distress. Appearance: Normal appearance. She is well-developed. She is obese. She is not ill-appearing. HENT:      Right Ear: Tympanic membrane normal.      Left Ear: Tympanic membrane normal.      Nose: Congestion present. No mucosal edema. Right Turbinates: Not swollen or pale. Left Turbinates: Not swollen or pale. Mouth/Throat:      Mouth: Mucous membranes are not dry. Pharynx: Posterior oropharyngeal erythema present. Eyes:      General: No scleral icterus. Conjunctiva/sclera: Conjunctivae normal.   Neck:      Musculoskeletal: Normal range of motion and neck supple. Cardiovascular:      Rate and Rhythm: Normal rate and regular rhythm. Heart sounds: No murmur. Pulmonary:      Effort: Pulmonary effort is normal.      Breath sounds: Normal breath sounds. No wheezing. Abdominal:      General: Bowel sounds are normal. There is no distension. Palpations: Abdomen is soft. Tenderness: There is no abdominal tenderness. There is no right CVA tenderness or left CVA tenderness. Musculoskeletal:      Right lower leg: No edema. Left lower leg: No edema. Right foot: Normal range of motion. No deformity. Left foot: Normal range of motion. No deformity. Feet:      Right foot:      Skin integrity: Skin integrity normal.      Toenail Condition: Right toenails are normal.      Left foot:      Skin integrity: Skin integrity normal.      Toenail Condition: Left toenails are normal.   Lymphadenopathy:      Cervical: No cervical adenopathy. Skin:     General: Skin is warm and dry. Findings: No rash. Neurological:      Mental Status: She is alert and oriented to person, place, and time.    Psychiatric:         Mood and Affect: Mood normal.         Behavior: Behavior normal.         Data:     Lab Results   Component Value Date     02/24/2020    K 4.3 02/24/2020    CL 98 02/24/2020    CO2 23 02/24/2020    BUN 13 02/24/2020    CREATININE 0.61 02/24/2020    GLUCOSE 290 02/24/2020    PROT 7.0 02/24/2020    LABALBU 3.9 02/24/2020    BILITOT 0.25 02/24/2020    ALKPHOS 77 02/24/2020    AST 18 02/24/2020    ALT 20 02/24/2020     Lab Results   Component Value Date    WBC 9.9 02/24/2020    RBC 4.73 02/24/2020    HGB 15.2 02/24/2020    HCT 45.9 02/24/2020    MCV 97.0 02/24/2020    MCH 32.1 02/24/2020    MCHC 33.1 02/24/2020    RDW 12.3 02/24/2020     02/24/2020    MPV 10.1 02/24/2020     Lab Results   Component Value Date    TSH 4.10 09/26/2019     Lab Results   Component Value Date    CHOL 182 09/28/2019    HDL 29 09/28/2019    LABA1C 8.8 08/17/2020 Assessment/Plan:      Diagnosis Orders   1. Type 2 diabetes mellitus without complication, without long-term current use of insulin (HCC)  lisinopril (PRINIVIL;ZESTRIL) 2.5 MG tablet    aspirin (ASPIRIN LOW DOSE) 81 MG EC tablet    metFORMIN (GLUCOPHAGE) 1000 MG tablet    AST    ALT    CBC Auto Differential    Microalbumin, Ur    Basic Metabolic Panel    Hemoglobin A1C   2. Mixed hyperlipidemia     3. Chronic allergic rhinitis  montelukast (SINGULAIR) 10 MG tablet   4. Right flank pain  POCT Urinalysis no Micro     Add Singular 10 mg daily for chronic allergic rhinitis, continue all other medications    UA normal in office, culture sent    Lab work in 3 months. Follow up as indicated. 1.  Lakshmi Herrera received counseling on the following healthy behaviors: nutrition, exercise and medication adherence  2. Patient given educational materials - see patient instructions  3. Was a self-tracking handout given in paper form or via [employer]? No  If yes, see orders or list here. 4.  Discussed use, benefit, and side effects of prescribed medications. Barriers to medication compliance addressed. All patient questions answered. Pt voiced understanding. 5.  Reviewed prior labs and health maintenance  6. Continue current medications, diet and exercise.     Completed Refills   Requested Prescriptions     Signed Prescriptions Disp Refills    metoprolol succinate (TOPROL XL) 25 MG extended release tablet 45 tablet 1     Sig: TAKE 1/2 TABLET BY MOUTH ONCE DAILY    lisinopril (PRINIVIL;ZESTRIL) 2.5 MG tablet 90 tablet 1     Sig: TAKE 1 TABLET BY MOUTH EVERY DAY    aspirin (ASPIRIN LOW DOSE) 81 MG EC tablet 100 tablet 1     Sig: TAKE 1 TABLET BY MOUTH EVERY DAY    metFORMIN (GLUCOPHAGE) 1000 MG tablet 180 tablet 1     Sig: TAKE 1 TABLET BY MOUTH TWICE A DAY WITH MEALS    montelukast (SINGULAIR) 10 MG tablet 90 tablet 0     Sig: Take 1 tablet by mouth daily         Return in about 3 months (around 5/17/2021) for Check up with Kye.

## 2021-02-17 NOTE — PATIENT INSTRUCTIONS
SURVEY:    You may be receiving a survey from [employer] regarding your visit today. Please complete the survey to enable us to provide the highest quality of care to you and your family. If you cannot score us a very good on any question, please call the office to discuss how we could have made your experience a very good one. Thank you. Patient Education        Counting Carbohydrates: Care Instructions  Your Care Instructions     You don't have to eat special foods when you have diabetes. You just have to be careful to eat healthy foods. Carbohydrates (carbs) raise blood sugar higher and quicker than any other nutrient. Carbs are found in desserts, breads and cereals, and fruit. They're also in starchy vegetables. These include potatoes, corn, and grains such as rice and pasta. Carbs are also in milk and yogurt. The more carbs you eat at one time, the higher your blood sugar will rise. Spreading carbs all through the day helps keep your blood sugar levels within your target range. Counting carbs is one of the best ways to keep your blood sugar under control. If you use insulin, counting carbs helps you match the right amount of insulin to the number of grams of carbs in a meal. Then you can change your diet and insulin dose as needed. Testing your blood sugar several times a day can help you learn how carbs affect your blood sugar. A registered dietitian or certified diabetes educator can help you plan meals and snacks. Follow-up care is a key part of your treatment and safety. Be sure to make and go to all appointments, and call your doctor if you are having problems. It's also a good idea to know your test results and keep a list of the medicines you take. How can you care for yourself at home?   Know your daily amount of carbohydrates · Be aware of portions, or serving sizes. If a package has two servings and you eat the whole package, you need to double the number of grams of carbohydrate listed for one serving. · Protein, fat, and fiber do not raise blood sugar as much as carbs do. If you eat a lot of these nutrients in a meal, your blood sugar will rise more slowly than it would otherwise. Eat from all food groups  · Eat at least three meals a day. · Plan meals to include food from all the food groups. The food groups include grains, fruits, dairy, proteins, and vegetables. · Talk to your dietitian or diabetes educator about ways to add limited amounts of sweets into your meal plan. · If you drink alcohol, talk to your doctor. It may not be recommended when you are taking certain diabetes medicines. Where can you learn more? Go to https://DoesThatMakeSense.compedaveyeweb.Paradial. org and sign in to your [employer] account. Enter I527 in the [employer]Kart box to learn more about \"Counting Carbohydrates: Care Instructions. \"     If you do not have an account, please click on the \"Sign Up Now\" link. Current as of: December 20, 2019               Content Version: 12.6  © 7271-0184 [employer]. Care instructions adapted under license by [facility] ([facility]). If you have questions about a medical condition or this instruction, always ask your healthcare professional. [address]  any warranty or liability for your use of this information.

## 2021-02-20 RX ORDER — SULFAMETHOXAZOLE AND TRIMETHOPRIM 800; 160 MG/1; MG/1
1 TABLET ORAL 2 TIMES DAILY
Qty: 10 TABLET | Refills: 0 | Status: SHIPPED | OUTPATIENT
Start: 2021-02-20 | End: 2021-02-25

## 2021-02-21 LAB
CULTURE: ABNORMAL
CULTURE: ABNORMAL
Lab: ABNORMAL
SPECIMEN DESCRIPTION: ABNORMAL

## 2021-02-22 ENCOUNTER — TELEPHONE (OUTPATIENT)
Dept: PRIMARY CARE CLINIC | Age: 46
End: 2021-02-22

## 2021-02-22 NOTE — TELEPHONE ENCOUNTER
----- Message from [address] [place] [address], APRN - CNP sent at 2/20/2021  1:57 PM EST -----  Bactrim Rx sent to pharmacy.

## 2021-02-22 NOTE — TELEPHONE ENCOUNTER
Called patient and left message that Bactrim was sent tot he pharmacy due to growth in urine culture. To call office with any questions.

## 2021-02-23 ENCOUNTER — PATIENT MESSAGE (OUTPATIENT)
Dept: PRIMARY CARE CLINIC | Age: 46
End: 2021-02-23

## 2021-02-24 NOTE — TELEPHONE ENCOUNTER
Called patient and left message that Osorio Christiano would like her to be seen in the walk in today. To call office with any questions.

## 2021-02-24 NOTE — TELEPHONE ENCOUNTER
From: Manny Montez  To: Kye Gamez, APRN - CNP  Sent: 2/23/2021 7:19 PM EST  Subject: Visit Follow-Up Question    I seen Devon Alvarado last week and told him about a bad taste coming from my sinuses, that has been around since October. He prescribed me an allergy med. I was exposed to some mold last Thursday, which triggered an asthma attack, and a low grade fever over the weekend. My  also ran a fever and was covid tested on Friday with a negative result. I took some cold and flu medicine and fever has gone away , however I have a pretty bad cough. I am using my albuterol inhaler. I have noticed these last 2 days when I have a bad coughing fit, the mucus is horrible smelling/tasting. Kind of like my sinuses but much worse. It makes me gag and feel very sick to my stomach. What should I do?

## 2021-03-17 ENCOUNTER — TELEPHONE (OUTPATIENT)
Dept: PRIMARY CARE CLINIC | Age: 46
End: 2021-03-17

## 2021-03-17 NOTE — TELEPHONE ENCOUNTER
Called patient and left message reminding her to have her cholesterol lab work done and that it requires a 12 hr fasting. To call office with any questions.

## 2021-03-26 ENCOUNTER — TELEPHONE (OUTPATIENT)
Dept: PRIMARY CARE CLINIC | Age: 46
End: 2021-03-26

## 2021-03-26 DIAGNOSIS — K21.9 GASTROESOPHAGEAL REFLUX DISEASE, UNSPECIFIED WHETHER ESOPHAGITIS PRESENT: Primary | ICD-10-CM

## 2021-03-26 RX ORDER — OMEPRAZOLE 20 MG/1
CAPSULE, DELAYED RELEASE ORAL
Qty: 30 CAPSULE | Refills: 5 | Status: SHIPPED | OUTPATIENT
Start: 2021-03-26 | End: 2021-05-19

## 2021-03-26 NOTE — TELEPHONE ENCOUNTER
Health Maintenance   Topic Date Due    COVID-19 Vaccine (1) Never done    Diabetic microalbuminuria test  09/28/2020    Lipid screen  09/28/2020    Potassium monitoring  02/24/2021    Creatinine monitoring  02/24/2021    Hepatitis B vaccine (1 of 3 - Risk 3-dose series) 08/17/2021 (Originally 7/9/1996)    DTaP/Tdap/Td vaccine (1 - Tdap) 08/17/2021 (Originally 7/9/1996)    Cervical cancer screen  08/17/2021 (Originally 7/9/1998)    HIV screen  08/17/2021 (Originally 7/9/1992)    Flu vaccine (1) 02/17/2022 (Originally 9/1/2020)    Hepatitis C screen  02/17/2022 (Originally 1975)    Diabetic foot exam  08/17/2021    Diabetic retinal exam  08/17/2021    A1C test (Diabetic or Prediabetic)  02/17/2022    Pneumococcal 0-64 years Vaccine  Completed    Hepatitis A vaccine  Aged Out    Hib vaccine  Aged Out    Meningococcal (ACWY) vaccine  Aged Out             (applicable per patient's age: Cancer Screenings, Depression Screening, Fall Risk Screening, Immunizations)    Hemoglobin A1C (%)   Date Value   02/17/2021 8.2   08/17/2020 8.8   09/28/2019 7.0 (H)     Microalb/Crt.  Ratio (mcg/mg creat)   Date Value   09/28/2019 CANNOT BE CALCULATED     LDL Cholesterol (mg/dL)   Date Value   09/28/2019 85     AST (U/L)   Date Value   02/24/2020 18     ALT (U/L)   Date Value   02/24/2020 20     BUN (mg/dL)   Date Value   02/24/2020 13      (goal A1C is < 7)   (goal LDL is <100) need 30-50% reduction from baseline     BP Readings from Last 3 Encounters:   02/17/21 134/80   08/17/20 104/69   03/19/20 (!) 124/91    (goal /80)      All Future Testing planned in [facility]:  Lab Frequency Next Occurrence   Lipid Panel Once 02/13/2021   AST Once 05/17/2021   ALT Once 05/17/2021   CBC Auto Differential Once 52/77/0931   Basic Metabolic Panel Once 47/99/1904   Hemoglobin A1C Once 05/18/2021       Next Visit Date:  Future Appointments   Date Time Provider Daryl Guillaume   5/19/2021  9:00 AM Kye Dixon[facility] - C[facility]            Patient Active Problem List:     Type 2 diabetes mellitus without complication, without long-term current use of insulin (HCC)     Tobacco abuse     Single kidney     Hyperlipidemia     Syncope     PCOS (polycystic ovarian syndrome)     Gastroesophageal reflux disease     Leukocytosis     Abscess     Dysthymia     Class 2 obesity with body mass index (BMI) of 37.0 to 37.9 in adult

## 2021-05-13 ENCOUNTER — TELEPHONE (OUTPATIENT)
Dept: PRIMARY CARE CLINIC | Age: 46
End: 2021-05-13

## 2021-05-13 NOTE — TELEPHONE ENCOUNTER
Left message in regards to open labs. Informed patient orders are in registration and to call the office with any questions.

## 2021-05-14 ENCOUNTER — HOSPITAL ENCOUNTER (OUTPATIENT)
Age: 46
Discharge: HOME | End: 2021-05-14
Payer: COMMERCIAL

## 2021-05-14 DIAGNOSIS — E11.9 TYPE 2 DIABETES MELLITUS WITHOUT COMPLICATION, WITHOUT LONG-TERM CURRENT USE OF INSULIN (HCC): ICD-10-CM

## 2021-05-14 LAB
ABSOLUTE EOS #: 0.33 K/UL (ref 0–0.44)
ABSOLUTE IMMATURE GRANULOCYTE: 0.04 K/UL (ref 0–0.3)
ABSOLUTE LYMPH #: 4.44 K/UL (ref 1.1–3.7)
ABSOLUTE MONO #: 0.51 K/UL (ref 0.1–1.2)
ALT SERPL-CCNC: 22 U/L (ref 5–33)
ANION GAP SERPL CALCULATED.3IONS-SCNC: 12 MMOL/L (ref 9–17)
AST SERPL-CCNC: 17 U/L
BASOPHILS # BLD: 0 % (ref 0–2)
BASOPHILS ABSOLUTE: 0.05 K/UL (ref 0–0.2)
BUN BLDV-MCNC: 11 MG/DL (ref 6–20)
BUN/CREAT BLD: 17 (ref 9–20)
CALCIUM SERPL-MCNC: 9.8 MG/DL (ref 8.6–10.4)
CHLORIDE BLD-SCNC: 102 MMOL/L (ref 98–107)
CO2: 21 MMOL/L (ref 20–31)
CREAT SERPL-MCNC: 0.65 MG/DL (ref 0.5–0.9)
DIFFERENTIAL TYPE: ABNORMAL
EOSINOPHILS RELATIVE PERCENT: 3 % (ref 1–4)
ESTIMATED AVERAGE GLUCOSE: 189 MG/DL
GFR AFRICAN AMERICAN: >60 ML/MIN
GFR NON-AFRICAN AMERICAN: >60 ML/MIN
GFR SERPL CREATININE-BSD FRML MDRD: ABNORMAL ML/MIN/{1.73_M2}
GFR SERPL CREATININE-BSD FRML MDRD: ABNORMAL ML/MIN/{1.73_M2}
GLUCOSE BLD-MCNC: 103 MG/DL (ref 70–99)
HBA1C MFR BLD: 8.2 % (ref 4–6)
HCT VFR BLD CALC: 44.3 % (ref 36.3–47.1)
HEMOGLOBIN: 14.9 G/DL (ref 11.9–15.1)
IMMATURE GRANULOCYTES: 0 %
LYMPHOCYTES # BLD: 40 % (ref 24–43)
MCH RBC QN AUTO: 32.8 PG (ref 25.2–33.5)
MCHC RBC AUTO-ENTMCNC: 33.6 G/DL (ref 28.4–34.8)
MCV RBC AUTO: 97.6 FL (ref 82.6–102.9)
MONOCYTES # BLD: 5 % (ref 3–12)
NRBC AUTOMATED: 0 PER 100 WBC
PDW BLD-RTO: 12.2 % (ref 11.8–14.4)
PLATELET # BLD: 281 K/UL (ref 138–453)
PLATELET ESTIMATE: ABNORMAL
PMV BLD AUTO: 9.8 FL (ref 8.1–13.5)
POTASSIUM SERPL-SCNC: 4.2 MMOL/L (ref 3.7–5.3)
RBC # BLD: 4.54 M/UL (ref 3.95–5.11)
RBC # BLD: ABNORMAL 10*6/UL
SEG NEUTROPHILS: 52 % (ref 36–65)
SEGMENTED NEUTROPHILS ABSOLUTE COUNT: 5.82 K/UL (ref 1.5–8.1)
SODIUM BLD-SCNC: 135 MMOL/L (ref 135–144)
WBC # BLD: 11.2 K/UL (ref 3.5–11.3)
WBC # BLD: ABNORMAL 10*3/UL

## 2021-05-14 PROCEDURE — 85025 COMPLETE CBC W/AUTO DIFF WBC: CPT

## 2021-05-14 PROCEDURE — 84450 TRANSFERASE (AST) (SGOT): CPT

## 2021-05-14 PROCEDURE — 80048 BASIC METABOLIC PNL TOTAL CA: CPT

## 2021-05-14 PROCEDURE — 84460 ALANINE AMINO (ALT) (SGPT): CPT

## 2021-05-14 PROCEDURE — 36415 COLL VENOUS BLD VENIPUNCTURE: CPT

## 2021-05-14 PROCEDURE — 83036 HEMOGLOBIN GLYCOSYLATED A1C: CPT

## 2021-05-19 ENCOUNTER — OFFICE VISIT (OUTPATIENT)
Dept: PRIMARY CARE CLINIC | Age: 46
End: 2021-05-19
Payer: COMMERCIAL

## 2021-05-19 VITALS
DIASTOLIC BLOOD PRESSURE: 60 MMHG | SYSTOLIC BLOOD PRESSURE: 130 MMHG | BODY MASS INDEX: 37.39 KG/M2 | HEIGHT: 63 IN | TEMPERATURE: 97.8 F | RESPIRATION RATE: 18 BRPM | OXYGEN SATURATION: 98 % | WEIGHT: 211 LBS | HEART RATE: 93 BPM

## 2021-05-19 DIAGNOSIS — E78.2 MIXED HYPERLIPIDEMIA: ICD-10-CM

## 2021-05-19 DIAGNOSIS — E11.9 TYPE 2 DIABETES MELLITUS WITHOUT COMPLICATION, WITHOUT LONG-TERM CURRENT USE OF INSULIN (HCC): Primary | ICD-10-CM

## 2021-05-19 DIAGNOSIS — E66.9 CLASS 2 OBESITY WITH BODY MASS INDEX (BMI) OF 37.0 TO 37.9 IN ADULT, UNSPECIFIED OBESITY TYPE, UNSPECIFIED WHETHER SERIOUS COMORBIDITY PRESENT: ICD-10-CM

## 2021-05-19 DIAGNOSIS — K21.9 GASTROESOPHAGEAL REFLUX DISEASE, UNSPECIFIED WHETHER ESOPHAGITIS PRESENT: ICD-10-CM

## 2021-05-19 PROBLEM — F34.1 DYSTHYMIA: Status: RESOLVED | Noted: 2019-08-19 | Resolved: 2021-05-19

## 2021-05-19 PROBLEM — L02.91 ABSCESS: Status: RESOLVED | Noted: 2018-09-06 | Resolved: 2021-05-19

## 2021-05-19 PROBLEM — D72.829 LEUKOCYTOSIS: Status: RESOLVED | Noted: 2018-08-22 | Resolved: 2021-05-19

## 2021-05-19 PROCEDURE — 3052F HG A1C>EQUAL 8.0%<EQUAL 9.0%: CPT | Performed by: NURSE PRACTITIONER

## 2021-05-19 PROCEDURE — 99214 OFFICE O/P EST MOD 30 MIN: CPT | Performed by: NURSE PRACTITIONER

## 2021-05-19 RX ORDER — OMEPRAZOLE 40 MG/1
40 CAPSULE, DELAYED RELEASE ORAL
Qty: 90 CAPSULE | Refills: 1 | Status: SHIPPED | OUTPATIENT
Start: 2021-05-19 | End: 2022-05-25 | Stop reason: SDUPTHER

## 2021-05-19 ASSESSMENT — ENCOUNTER SYMPTOMS
SWOLLEN GLANDS: 0
GLOBUS SENSATION: 0
ABDOMINAL PAIN: 0
BELCHING: 0
HOARSE VOICE: 0
DIARRHEA: 0
SINUS PAIN: 0
SHORTNESS OF BREATH: 0
CONSTIPATION: 0
HEARTBURN: 1
VISUAL CHANGE: 0
VOMITING: 0
COUGH: 0
WHEEZING: 0
SORE THROAT: 0
NAUSEA: 0
STRIDOR: 0
RHINORRHEA: 0

## 2021-05-19 NOTE — PATIENT INSTRUCTIONS
SURVEY:    You may be receiving a survey from [employer] regarding your visit today. Please complete the survey to enable us to provide the highest quality of care to you and your family. If you cannot score us a very good on any question, please call the office to discuss how we could have made your experience a very good one. Thank you.   Dionne Lindsay, APRN-NILESH Gamez, APRN-CNP  Jojo Mclean, ROSALINE Marquis MA Kee Donald, MA Pam, PCA

## 2021-05-19 NOTE — PROGRESS NOTES
Name: Mari Thurman  : 1975         Chief Complaint:     Chief Complaint   Patient presents with    Check-Up     Wants to discuss Prilosec dosage.  Diabetes       History of Present Illness:      Mari Thurman is a 39 y.o.  female who presents with Check-Up (Wants to discuss Prilosec dosage. ) and Diabetes      Virginia Wylie is here today for a routine office visit. She reports that she is doing well. Her only concern today is that her heartburn has worsened since changing Prilosec from 20 mg to 40 mg. Obesity-weight has been stable. She does watch what she eats however does not get regular exercise. Diabetes  She presents for her follow-up diabetic visit. She has type 2 diabetes mellitus. Her disease course has been worsening. There are no hypoglycemic associated symptoms. Pertinent negatives for hypoglycemia include no dizziness, headaches, nervousness/anxiousness, sweats or tremors. There are no diabetic associated symptoms. Pertinent negatives for diabetes include no chest pain, no fatigue, no foot paresthesias, no polydipsia, no polyphagia, no polyuria, no visual change, no weakness and no weight loss. There are no hypoglycemic complications. Pertinent negatives for hypoglycemia complications include no hospitalization, no nocturnal hypoglycemia and no required assistance. Symptoms are stable. Pertinent negatives for diabetic complications include no CVA, heart disease, nephropathy or peripheral neuropathy. Risk factors for coronary artery disease include dyslipidemia, diabetes mellitus and obesity. Current diabetic treatment includes oral agent (triple therapy). She is compliant with treatment all of the time. Her weight is stable. She is following a generally healthy diet. Meal planning includes avoidance of concentrated sweets. She has not had a previous visit with a dietitian. She rarely participates in exercise. There is no change in her home blood glucose trend.  Her breakfast blood glucose range is generally 180-200 mg/dl. An ACE inhibitor/angiotensin II receptor blocker is being taken. She does not see a podiatrist.Eye exam is current. Gastroesophageal Reflux  She complains of heartburn. She reports no abdominal pain, no belching, no chest pain, no coughing, no globus sensation, no hoarse voice, no nausea, no sore throat, no stridor, no tooth decay or no wheezing. This is a recurrent problem. The current episode started more than 1 year ago. The problem occurs occasionally. The problem has been gradually worsening (Since dosage of Prilosec change from 40 mg to 20.). The heartburn is of mild intensity. The symptoms are aggravated by certain foods, caffeine and smoking. Pertinent negatives include no fatigue, melena or weight loss. Risk factors include caffeine use, lack of exercise, smoking/tobacco exposure and obesity. She has tried a PPI for the symptoms. The treatment provided significant relief. Past procedures do not include an EGD or a UGI. Past invasive treatments do not include gastroplasty, gastroplication or reflux surgery. Past Medical History:     Past Medical History:   Diagnosis Date    Anxiety 2007    Depression 1993    GERD (gastroesophageal reflux disease) 2010    H/O cardiovascular stress test 07/02/2018    Largerly normal myocardial perfusion imaging with soft tissue artifact but without evidence of signinfcant myocardial ischemia or infarction. EF 75%. The pt duke treadmill score is 6.    H/O echocardiogram 07/02/2018    EF >70%. The LV wallt thickness is mildly incrased.  H/O tilt table evaluation 05/24/2018    Borderline abnormal head upright tilt table study. Although the pt heart rate, blood pressure and symptoms were suggestive of orthostatic intolerance. Therefore, if clinically suspicion remains high, a trial of empiric trreatment and/or re-testing may be indicated.      Hyperlipidemia 2010    Hypertension 2009    Kidney dysfunction     pt only has one kidney    Obesity 1999    Type 2 diabetes mellitus without complication ([facility] [address] 2005      Reviewed all health maintenance requirements and ordered appropriate tests  Health Maintenance Due   Topic Date Due    Diabetic microalbuminuria test  09/28/2020    Lipid screen  09/28/2020       Past Surgical History:     Past Surgical History:   Procedure Laterality Date    BLADDER SUSPENSION      CARPAL TUNNEL RELEASE      CHOLECYSTECTOMY      DILATION AND CURETTAGE OF UTERUS  2007    KIDNEY REMOVAL      left        Medications:       Prior to Admission medications    Medication Sig Start Date End Date Taking?  Authorizing Provider   omeprazole (PRILOSEC) 40 MG delayed release capsule Take 1 capsule by mouth every morning (before breakfast) 5/19/21  Yes RONN Hawkins CNP   dapagliflozin (FARXIGA) 5 MG tablet Take 1 tablet by mouth every morning 5/19/21  Yes RONN Hawkins CNP   dapagliflozin (FARXIGA) 10 MG tablet Take 1 tablet by mouth every morning 6/19/21  Yes RONN Hawkins CNP   metoprolol succinate (TOPROL XL) 25 MG extended release tablet TAKE 1/2 TABLET BY MOUTH ONCE DAILY 2/17/21  Yes ORNN Woods CNP   lisinopril (PRINIVIL;ZESTRIL) 2.5 MG tablet TAKE 1 TABLET BY MOUTH EVERY DAY 2/17/21  Yes RONN Woods CNP   aspirin (ASPIRIN LOW DOSE) 81 MG EC tablet TAKE 1 TABLET BY MOUTH EVERY DAY 2/17/21  Yes RONN Woods CNP   metFORMIN (GLUCOPHAGE) 1000 MG tablet TAKE 1 TABLET BY MOUTH TWICE A DAY WITH MEALS 2/17/21  Yes RONN Woods CNP   montelukast (SINGULAIR) 10 MG tablet Take 1 tablet by mouth daily 2/17/21  Yes RONN Woods CNP   SITagliptin (JANUVIA) 100 MG tablet TAKE 1 TABLET BY MOUTH EVERY DAY 2/3/21  Yes RONN Hawkins CNP   atorvastatin (LIPITOR) 40 MG tablet Take 1 tablet by mouth daily 8/17/20  Yes RONN Woods CNP   glimepiride (AMARYL) 4 MG tablet Take 1 tablet by mouth every morning (before breakfast) 8/17/20  Yes RONN Ramírez CNP   albuterol sulfate  (90 Base) MCG/ACT inhaler Inhale 2 puffs into the lungs 4 times daily as needed for Wheezing or Shortness of Breath 12/31/19  Yes RONN Ramírez CNP   ibuprofen (ADVIL;MOTRIN) 200 MG tablet Take 800 mg by mouth every 6 hours as needed for Pain   Yes Historical Provider, MD   CRANBERRY-VITAMIN C PO Take by mouth daily    Yes Historical Provider, MD   fluticasone (FLONASE) 50 MCG/ACT nasal spray 1 spray by Each Nostril route daily  Patient not taking: Reported on 2/17/2021 10/14/20   RONN Hawkins CNP        Allergies:       Pcn [penicillins]    Social History:     Tobacco:    reports that she has been smoking cigarettes. She has a 25.00 pack-year smoking history. She has never used smokeless tobacco.  Alcohol:      reports no history of alcohol use. Drug Use:  reports no history of drug use. Family History:     Family History   Problem Relation Age of Onset    High Cholesterol Mother     Other Brother     Alcohol Abuse Maternal Grandmother     Cancer Maternal Grandmother     Depression Maternal Grandmother     Diabetes Maternal Grandmother     Heart Disease Maternal Grandmother     High Blood Pressure Maternal Grandmother     High Cholesterol Maternal Grandmother     Mental Illness Maternal Grandmother     Obesity Maternal Grandmother     Other Maternal Grandfather        Review of Systems:     Positive and Negative as described in HPI    Review of Systems   Constitutional: Negative for activity change, appetite change, chills, fatigue, fever, unexpected weight change and weight loss. HENT: Negative for congestion, ear pain, hoarse voice, rhinorrhea, sinus pain, sneezing and sore throat. Eyes: Negative for visual disturbance. Respiratory: Negative for cough, shortness of breath and wheezing. Cardiovascular: Positive for leg swelling (Chronic intermittent right pedal swelling with standing too long). Negative for chest pain and palpitations. Gastrointestinal: Positive for heartburn. Negative for abdominal pain, constipation, diarrhea, melena, nausea and vomiting. Endocrine: Negative for polydipsia, polyphagia and polyuria. Genitourinary: Negative for difficulty urinating and dysuria. Musculoskeletal: Negative for gait problem, joint pain, neck pain and neck stiffness. Skin: Negative for rash. Neurological: Negative for dizziness, tremors, syncope, weakness, light-headedness and headaches. Psychiatric/Behavioral: The patient is not nervous/anxious. Physical Exam:   Vitals:  /60 (Site: Left Upper Arm, Position: Sitting, Cuff Size: Large Adult)   Pulse 93   Temp 97.8 °F (36.6 °C) (Temporal)   Resp 18   Ht 5' 3\" (1.6 m)   Wt 211 lb (95.7 kg)   SpO2 98%   BMI 37.38 kg/m²     Physical Exam  Vitals and nursing note reviewed. Constitutional:       General: She is not in acute distress. Appearance: Normal appearance. She is well-developed. She is obese. She is not toxic-appearing or diaphoretic. HENT:      Head: Normocephalic and atraumatic. Right Ear: Tympanic membrane normal.      Left Ear: Tympanic membrane normal.      Nose: No mucosal edema, congestion or rhinorrhea. Right Turbinates: Not swollen or pale. Left Turbinates: Not swollen or pale. Mouth/Throat:      Mouth: Mucous membranes are moist. Mucous membranes are not dry. Pharynx: No oropharyngeal exudate or posterior oropharyngeal erythema. Eyes:      General: No scleral icterus. Conjunctiva/sclera: Conjunctivae normal.   Cardiovascular:      Rate and Rhythm: Normal rate and regular rhythm. Heart sounds: No murmur heard. Pulmonary:      Effort: Pulmonary effort is normal. No respiratory distress. Breath sounds: Normal breath sounds. No wheezing, rhonchi or rales. Abdominal:      General: Bowel sounds are normal. There is no distension. Palpations: Abdomen is soft. complication, without long-term current use of insulin (HCC)  dapagliflozin (FARXIGA) 5 MG tablet    dapagliflozin (FARXIGA) 10 MG tablet    Basic Metabolic Panel    Microalbumin, Ur    TSH without Reflex   2. Gastroesophageal reflux disease, unspecified whether esophagitis present  omeprazole (PRILOSEC) 40 MG delayed release capsule   3. Mixed hyperlipidemia  Lipid Panel     Type 2 diabetesA1c still 8.2, would like to get A1c closer to 7. We will add [place]. Start with 5 mg x 1 month and then increase to 10 mg daily. Continue Metformin 1000 mg twice daily, Januvia 100 mg daily and Amaryl 4 mg daily. GERDreports she has been having more frequent heartburn since omeprazole dosage switch from 40 mg to 20. Will increase dosage back to 40 mg. She is to contact us if she continues to have heartburn despite increase in dosage. Right pedal edemastable. Discussed getting compression stockings to use when up. 1.  Dl Bravo received counseling on the following healthy behaviors: nutrition, exercise and medication adherence  2. Patient given educational materials - see patient instructions  3. Was a self-tracking handout given in paper form or via [employer]? No  If yes, see orders or list here. 4.  Discussed use, benefit, and side effects of prescribed medications. Barriers to medication compliance addressed. All patient questions answered. Pt voiced understanding. 5.  Reviewed prior labs and health maintenance  6. Continue current medications, diet and exercise.     Completed Refills   Requested Prescriptions     Signed Prescriptions Disp Refills    omeprazole (PRILOSEC) 40 MG delayed release capsule 90 capsule 1     Sig: Take 1 capsule by mouth every morning (before breakfast)    dapagliflozin (FARXIGA) 5 MG tablet 30 tablet 0     Sig: Take 1 tablet by mouth every morning    dapagliflozin (FARXIGA) 10 MG tablet 90 tablet 1     Sig: Take 1 tablet by mouth every morning         Return in about 3

## 2021-05-30 DIAGNOSIS — J30.9 CHRONIC ALLERGIC RHINITIS: ICD-10-CM

## 2021-06-01 RX ORDER — MONTELUKAST SODIUM 10 MG/1
TABLET ORAL
Qty: 30 TABLET | Refills: 2 | Status: SHIPPED | OUTPATIENT
Start: 2021-06-01 | End: 2021-09-14

## 2021-06-01 NOTE — TELEPHONE ENCOUNTER
Health Maintenance   Topic Date Due    Diabetic microalbuminuria test  09/28/2020    Lipid screen  09/28/2020    Hepatitis B vaccine (1 of 3 - Risk 3-dose series) 08/17/2021 (Originally 7/9/1996)    DTaP/Tdap/Td vaccine (1 - Tdap) 08/17/2021 (Originally 7/9/1996)    Cervical cancer screen  08/17/2021 (Originally 7/9/1998)    HIV screen  08/17/2021 (Originally 7/9/1992)    Flu vaccine (Season Ended) 02/17/2022 (Originally 9/1/2021)    Hepatitis C screen  02/17/2022 (Originally 1975)    Diabetic foot exam  08/17/2021    Diabetic retinal exam  08/17/2021    A1C test (Diabetic or Prediabetic)  05/14/2022    Potassium monitoring  05/14/2022    Creatinine monitoring  05/14/2022    Pneumococcal 0-64 years Vaccine (2 of 2) 07/09/2042    COVID-19 Vaccine  Completed    Hepatitis A vaccine  Aged Out    Hib vaccine  Aged Out    Meningococcal (ACWY) vaccine  Aged Out             (applicable per patient's age: Cancer Screenings, Depression Screening, Fall Risk Screening, Immunizations)    Hemoglobin A1C (%)   Date Value   05/14/2021 8.2 (H)   02/17/2021 8.2   08/17/2020 8.8     Microalb/Crt.  Ratio (mcg/mg creat)   Date Value   09/28/2019 CANNOT BE CALCULATED     LDL Cholesterol (mg/dL)   Date Value   09/28/2019 85     AST (U/L)   Date Value   05/14/2021 17     ALT (U/L)   Date Value   05/14/2021 22     BUN (mg/dL)   Date Value   05/14/2021 11      (goal A1C is < 7)   (goal LDL is <100) need 30-50% reduction from baseline     BP Readings from Last 3 Encounters:   05/19/21 130/60   02/17/21 134/80   08/17/20 104/69    (goal /80)      All Future Testing planned in [facility]:  Lab Frequency Next Occurrence   Basic Metabolic Panel Once 38/95/7714   Microalbumin, Ur Once 08/19/2021   Lipid Panel Once 08/17/2021   TSH without Reflex Once 08/17/2021       Next Visit Date:  Future Appointments   Date Time Provider Daryl Guillaume   8/12/2021  8:30 AM RONN Serrano - CNP [facility] Active Problem List:     Type 2 diabetes mellitus without complication, without long-term current use of insulin (HCC)     Tobacco abuse     Single kidney     Hyperlipidemia     Syncope     PCOS (polycystic ovarian syndrome)     Gastroesophageal reflux disease     Class 2 obesity with body mass index (BMI) of 37.0 to 37.9 in adult

## 2021-08-12 ENCOUNTER — OFFICE VISIT (OUTPATIENT)
Dept: PRIMARY CARE CLINIC | Age: 46
End: 2021-08-12
Payer: COMMERCIAL

## 2021-08-12 VITALS
RESPIRATION RATE: 18 BRPM | DIASTOLIC BLOOD PRESSURE: 71 MMHG | WEIGHT: 210.6 LBS | SYSTOLIC BLOOD PRESSURE: 118 MMHG | BODY MASS INDEX: 37.32 KG/M2 | OXYGEN SATURATION: 97 % | HEIGHT: 63 IN | HEART RATE: 92 BPM | TEMPERATURE: 97.8 F

## 2021-08-12 DIAGNOSIS — E11.9 TYPE 2 DIABETES MELLITUS WITHOUT COMPLICATION, WITHOUT LONG-TERM CURRENT USE OF INSULIN (HCC): Primary | ICD-10-CM

## 2021-08-12 DIAGNOSIS — E78.5 DYSLIPIDEMIA: ICD-10-CM

## 2021-08-12 DIAGNOSIS — E66.9 CLASS 2 OBESITY WITH BODY MASS INDEX (BMI) OF 37.0 TO 37.9 IN ADULT, UNSPECIFIED OBESITY TYPE, UNSPECIFIED WHETHER SERIOUS COMORBIDITY PRESENT: ICD-10-CM

## 2021-08-12 DIAGNOSIS — K21.9 GASTROESOPHAGEAL REFLUX DISEASE, UNSPECIFIED WHETHER ESOPHAGITIS PRESENT: ICD-10-CM

## 2021-08-12 LAB — HBA1C MFR BLD: 7.8 %

## 2021-08-12 PROCEDURE — 83036 HEMOGLOBIN GLYCOSYLATED A1C: CPT | Performed by: NURSE PRACTITIONER

## 2021-08-12 PROCEDURE — 3051F HG A1C>EQUAL 7.0%<8.0%: CPT | Performed by: NURSE PRACTITIONER

## 2021-08-12 PROCEDURE — 99214 OFFICE O/P EST MOD 30 MIN: CPT | Performed by: NURSE PRACTITIONER

## 2021-08-12 RX ORDER — METOPROLOL SUCCINATE 25 MG/1
TABLET, EXTENDED RELEASE ORAL
Qty: 45 TABLET | Refills: 1 | Status: SHIPPED | OUTPATIENT
Start: 2021-08-12 | End: 2022-05-25 | Stop reason: SDUPTHER

## 2021-08-12 RX ORDER — LISINOPRIL 2.5 MG/1
TABLET ORAL
Qty: 90 TABLET | Refills: 1 | Status: SHIPPED | OUTPATIENT
Start: 2021-08-12 | End: 2022-05-25 | Stop reason: SDUPTHER

## 2021-08-12 RX ORDER — GLIMEPIRIDE 4 MG/1
4 TABLET ORAL
Qty: 90 TABLET | Refills: 3 | Status: SHIPPED | OUTPATIENT
Start: 2021-08-12 | End: 2022-08-25 | Stop reason: SDUPTHER

## 2021-08-12 ASSESSMENT — ENCOUNTER SYMPTOMS
SINUS PAIN: 0
WHEEZING: 0
GLOBUS SENSATION: 0
VISUAL CHANGE: 0
HOARSE VOICE: 0
STRIDOR: 0
RHINORRHEA: 0
COUGH: 0
HEARTBURN: 0
ABDOMINAL PAIN: 0
DIARRHEA: 0
NAUSEA: 0
SORE THROAT: 0
SHORTNESS OF BREATH: 0
CONSTIPATION: 0
VOMITING: 0
BELCHING: 0

## 2021-08-12 NOTE — PROGRESS NOTES
Name: Ashkan Arreguin  : 1975         Chief Complaint:     Chief Complaint   Patient presents with    Diabetes     3 month check. No concerns. History of Present Illness:      Ashkan Arreguin is a 42 y.o.  female who presents with Diabetes (3 month check. No concerns. )      Apple Garcia is here today for a routine office visit. She reports that she is doing well and has no concerns at this time. She does report after last visit she took [place] for 1 month and then did not get the second prescription for the 10 mg. She did states she tolerated the [place] well. Obesity-weight has been stable. She reports is non compliant with exercise. Tries to watch what she is eating at times. Tobacco abuse-smoking 1 pack of cigarettes daily. Denies any desire to quit at this time. Denies any significant cough or shortness of breath. Diabetes  She presents for her follow-up diabetic visit. She has type 2 diabetes mellitus. Her disease course has been worsening. There are no hypoglycemic associated symptoms. Pertinent negatives for hypoglycemia include no dizziness, headaches, nervousness/anxiousness, sweats or tremors. There are no diabetic associated symptoms. Pertinent negatives for diabetes include no chest pain, no fatigue, no foot paresthesias, no polydipsia, no polyphagia, no polyuria, no visual change, no weakness and no weight loss. There are no hypoglycemic complications. Pertinent negatives for hypoglycemia complications include no hospitalization, no nocturnal hypoglycemia and no required assistance. Symptoms are stable. Pertinent negatives for diabetic complications include no CVA, heart disease, nephropathy or peripheral neuropathy. Risk factors for coronary artery disease include dyslipidemia, diabetes mellitus and obesity. Current diabetic treatments: Four oral diabetic medications. She is compliant with treatment all of the time. Her weight is stable.  She is following a generally unhealthy diet. Meal planning includes avoidance of concentrated sweets. She has not had a previous visit with a dietitian. She rarely participates in exercise. There is no change in her home blood glucose trend. An ACE inhibitor/angiotensin II receptor blocker is being taken. She does not see a podiatrist.Eye exam is not current (Getting september 15th). Gastroesophageal Reflux  She reports no abdominal pain, no belching, no chest pain, no coughing, no globus sensation, no heartburn, no hoarse voice, no nausea, no sore throat, no stridor, no tooth decay or no wheezing. This is a chronic problem. The current episode started more than 1 year ago. The problem occurs rarely. Progression since onset: Resolved. The heartburn is of mild intensity. The symptoms are aggravated by certain foods, caffeine and smoking. Pertinent negatives include no fatigue, melena or weight loss. Risk factors include caffeine use, lack of exercise, smoking/tobacco exposure and obesity. She has tried a PPI for the symptoms. The treatment provided significant relief. Past procedures do not include an EGD or a UGI. Past invasive treatments do not include gastroplasty, gastroplication or reflux surgery. Hyperlipidemia  This is a chronic problem. The current episode started more than 1 year ago. Exacerbating diseases include diabetes and obesity. She has no history of chronic renal disease. Factors aggravating her hyperlipidemia include beta blockers, fatty foods and smoking. Pertinent negatives include no chest pain or shortness of breath. Current antihyperlipidemic treatment includes statins. Compliance problems include adherence to exercise and adherence to diet. Risk factors for coronary artery disease include diabetes mellitus, dyslipidemia and obesity.          Past Medical History:     Past Medical History:   Diagnosis Date    Anxiety 2007    Depression 1993    GERD (gastroesophageal reflux disease) 2010    H/O cardiovascular stress test 07/02/2018    Largerly normal myocardial perfusion imaging with soft tissue artifact but without evidence of signinfcant myocardial ischemia or infarction. EF 75%. The pt duke treadmill score is 6.    H/O echocardiogram 07/02/2018    EF >70%. The LV wallt thickness is mildly incrased.  H/O tilt table evaluation 05/24/2018    Borderline abnormal head upright tilt table study. Although the pt heart rate, blood pressure and symptoms were suggestive of orthostatic intolerance. Therefore, if clinically suspicion remains high, a trial of empiric trreatment and/or re-testing may be indicated.  Hyperlipidemia 2010    Hypertension 2009    Kidney dysfunction     pt only has one kidney    Obesity 1999    [facility] 2302      Reviewed all health maintenance requirements and ordered appropriate tests  Health Maintenance Due   Topic Date Due    Diabetic microalbuminuria test  09/28/2020    Lipid screen  09/28/2020    Diabetic retinal exam  08/17/2021       Past Surgical History:     Past Surgical History:   Procedure Laterality Date    BLADDER SUSPENSION      CARPAL TUNNEL RELEASE      CHOLECYSTECTOMY      DILATION AND CURETTAGE OF UTERUS  2007    KIDNEY REMOVAL      left        Medications:       Prior to Admission medications    Medication Sig Start Date End Date Taking?  Authorizing Provider   dapagliflozin (FARXIGA) 5 MG tablet Take 1 tablet by mouth every morning 8/12/21  Yes [de-identified] M RONN Gamez CNP   dapagliflozin (FARXIGA) 10 MG tablet Take 1 tablet by mouth every morning 9/12/21  Yes RONN Hawkins CNP   glimepiride (AMARYL) 4 MG tablet Take 1 tablet by mouth every morning (before breakfast) 8/12/21  Yes RONN Hawkins CNP   lisinopril (PRINIVIL;ZESTRIL) 2.5 MG tablet TAKE 1 TABLET BY MOUTH EVERY DAY 8/12/21  Yes [de-identified] M RONN Gamez CNP   metFORMIN (GLUCOPHAGE) 1000 MG tablet TAKE 1 TABLET BY MOUTH TWICE A DAY WITH Cholesterol Maternal Grandmother     Mental Illness Maternal Grandmother     Obesity Maternal Grandmother     Other Maternal Grandfather        Review of Systems:     Positive and Negative as described in HPI    Review of Systems   Constitutional: Negative for activity change, appetite change, chills, fatigue, fever, unexpected weight change and weight loss. HENT: Negative for congestion, ear pain, hoarse voice, rhinorrhea, sinus pain, sneezing and sore throat. Eyes: Negative for visual disturbance. Respiratory: Negative for cough, shortness of breath and wheezing. Cardiovascular: Positive for leg swelling (Chronic intermittent right pedal swelling with standing too long). Negative for chest pain and palpitations. Gastrointestinal: Negative for abdominal pain, constipation, diarrhea, heartburn, melena, nausea and vomiting. Endocrine: Negative for polydipsia, polyphagia and polyuria. Genitourinary: Negative for difficulty urinating and dysuria. Musculoskeletal: Negative for gait problem, neck pain and neck stiffness. Skin: Negative for rash. Neurological: Negative for dizziness, tremors, syncope, weakness, light-headedness and headaches. Psychiatric/Behavioral: The patient is not nervous/anxious. Physical Exam:   Vitals:  /71 (Site: Left Upper Arm, Position: Sitting, Cuff Size: Large Adult)   Pulse 92   Temp 97.8 °F (36.6 °C) (Temporal)   Resp 18   Ht 5' 3\" (1.6 m)   Wt 210 lb 9.6 oz (95.5 kg)   SpO2 97%   BMI 37.31 kg/m²     Physical Exam  Vitals and nursing note reviewed. Constitutional:       General: She is not in acute distress. Appearance: Normal appearance. She is well-developed. She is obese. She is not toxic-appearing or diaphoretic. HENT:      Head: Normocephalic and atraumatic. Right Ear: Tympanic membrane normal.      Left Ear: Tympanic membrane normal.      Nose: No mucosal edema, congestion or rhinorrhea.       Right Turbinates: Not swollen or pale.      Left Turbinates: Not swollen or pale. Mouth/Throat:      Mouth: Mucous membranes are moist. Mucous membranes are not dry. Pharynx: No oropharyngeal exudate or posterior oropharyngeal erythema. Eyes:      General: No scleral icterus. Conjunctiva/sclera: Conjunctivae normal.   Cardiovascular:      Rate and Rhythm: Normal rate and regular rhythm. Heart sounds: No murmur heard. Pulmonary:      Effort: Pulmonary effort is normal. No respiratory distress. Breath sounds: Normal breath sounds. No wheezing, rhonchi or rales. Abdominal:      General: Bowel sounds are normal. There is no distension. Palpations: Abdomen is soft. Tenderness: There is no abdominal tenderness. There is no right CVA tenderness or left CVA tenderness. Musculoskeletal:      Cervical back: Normal range of motion and neck supple. Right lower leg: No edema. Left lower leg: No edema. Right foot: Normal range of motion. No deformity. Left foot: Normal range of motion. No deformity. Feet:      Right foot:      Protective Sensation: 6 sites tested. 6 sites sensed. Skin integrity: Skin integrity normal.      Toenail Condition: Right toenails are normal.      Left foot:      Protective Sensation: 6 sites tested. 6 sites sensed. Skin integrity: Skin integrity normal.      Toenail Condition: Left toenails are normal.   Lymphadenopathy:      Cervical: No cervical adenopathy. Skin:     General: Skin is warm and dry. Findings: No rash. Neurological:      General: No focal deficit present. Mental Status: She is alert and oriented to person, place, and time.    Psychiatric:         Mood and Affect: Mood normal.         Behavior: Behavior normal.         Data:     Lab Results   Component Value Date     05/14/2021    K 4.2 05/14/2021     05/14/2021    CO2 21 05/14/2021    BUN 11 05/14/2021    CREATININE 0.65 05/14/2021    GLUCOSE 103 05/14/2021    PROT 7.0 02/24/2020    LABALBU 3.9 02/24/2020    BILITOT 0.25 02/24/2020    ALKPHOS 77 02/24/2020    AST 17 05/14/2021    ALT 22 05/14/2021     Lab Results   Component Value Date    WBC 11.2 05/14/2021    RBC 4.54 05/14/2021    HGB 14.9 05/14/2021    HCT 44.3 05/14/2021    MCV 97.6 05/14/2021    MCH 32.8 05/14/2021    MCHC 33.6 05/14/2021    RDW 12.2 05/14/2021     05/14/2021    MPV 9.8 05/14/2021     Lab Results   Component Value Date    TSH 4.10 09/26/2019     Lab Results   Component Value Date    CHOL 182 09/28/2019    HDL 29 09/28/2019    LABA1C 7.8 08/12/2021       Assessment/Plan:      Diagnosis Orders   1. Type 2 diabetes mellitus without complication, without long-term current use of insulin ([facility])  POCT glycosylated hemoglobin (Hb A1C)     DIABETES FOOT EXAM    dapagliflozin (FARXIGA) 5 MG tablet    dapagliflozin (FARXIGA) 10 MG tablet    glimepiride (AMARYL) 4 MG tablet    lisinopril (PRINIVIL;ZESTRIL) 2.5 MG tablet    metFORMIN (GLUCOPHAGE) 1000 MG tablet    SITagliptin (JANUVIA) 100 MG tablet   2. Gastroesophageal reflux disease, unspecified whether esophagitis present     3. Dyslipidemia     4. Class 2 obesity with body mass index (BMI) of 37.0 to 37.9 in adult, unspecified obesity type, unspecified whether serious comorbidity present       Type 2 diabetes-A1c did improve from 8.2-7.8. She was only on the [place] x1 month. Will resend dose for 5 mg x 1 month and then increase to 10 mg daily. We will make no other medication adjustments. Discussed importance of diet and exercise. Diabetic foot exam completed in the office. She is going to have her diabetic eye exam completed in September. She has orders for blood work and microalbumin that need to be completed. She verbalized understanding and will have these completed within the next week. Discussed having her see diabetic education however she would like to hold off on this at this time. GERD-stable.   Continue Prilosec 40 mg daily.    Dyslipidemia-she has orders for lipid panel to be completed. Continue atorvastatin 40 mg daily. 1.  Viry Aguilera received counseling on the following healthy behaviors: nutrition, exercise, medication adherence and tobacco cessation  2. Patient given educational materials - see patient instructions  3. Was a self-tracking handout given in paper form or via [employer]? No  If yes, see orders or list here. 4.  Discussed use, benefit, and side effects of prescribed medications. Barriers to medication compliance addressed. All patient questions answered. Pt voiced understanding. 5.  Reviewed prior labs and health maintenance  6. Continue current medications, diet and exercise. Completed Refills   Requested Prescriptions     Signed Prescriptions Disp Refills    dapagliflozin (FARXIGA) 5 MG tablet 30 tablet 0     Sig: Take 1 tablet by mouth every morning    dapagliflozin (FARXIGA) 10 MG tablet 90 tablet 1     Sig: Take 1 tablet by mouth every morning    glimepiride (AMARYL) 4 MG tablet 90 tablet 3     Sig: Take 1 tablet by mouth every morning (before breakfast)    lisinopril (PRINIVIL;ZESTRIL) 2.5 MG tablet 90 tablet 1     Sig: TAKE 1 TABLET BY MOUTH EVERY DAY    metFORMIN (GLUCOPHAGE) 1000 MG tablet 180 tablet 1     Sig: TAKE 1 TABLET BY MOUTH TWICE A DAY WITH MEALS    metoprolol succinate (TOPROL XL) 25 MG extended release tablet 45 tablet 1     Sig: TAKE 1/2 TABLET BY MOUTH ONCE DAILY    SITagliptin (JANUVIA) 100 MG tablet 90 tablet 1     Sig: TAKE 1 TABLET BY MOUTH EVERY DAY         Return in about 3 months (around 11/12/2021) for Check up.

## 2021-08-12 NOTE — PATIENT INSTRUCTIONS
SURVEY:    You may be receiving a survey from [email or web address] regarding your visit today. Please complete the survey to enable us to provide the highest quality of care to you and your family. If you cannot score us a very good on any question, please call the office to discuss how we could have made your experience a very good one. Thank you. Evert Severe Might, APRN-NILESH Gamez, APRN-CNP  Brain Mosqueda, ROSALINE Umana, ROSALINE Smith, ALDA Epstein, PCA  Patient Education        Type 2 Diabetes: Care Instructions  Your Care Instructions     Type 2 diabetes is a disease that develops when the body's tissues cannot use insulin properly. Over time, the pancreas cannot make enough insulin. Insulin is a hormone that helps the body's cells use sugar (glucose) for energy. It also helps the body store extra sugar in muscle, fat, and liver cells. Without insulin, the sugar cannot get into the cells to do its work. It stays in the blood instead. This can cause high blood sugar levels. A person has diabetes when the blood sugar stays too high too much of the time. Over time, diabetes can lead to diseases of the heart, blood vessels, nerves, kidneys, and eyes. You may be able to control your blood sugar by losing weight, eating a healthy diet, and getting daily exercise. You may also have to take insulin or other diabetes medicine. Follow-up care is a key part of your treatment and safety. Be sure to make and go to all appointments. Call your doctor if you are having problems. It's also a good idea to know your test results and keep a list of the medicines you take. How can you care for yourself at home? · Keep your blood sugar at a target level (which you set with your doctor). ? Carbohydrate--the body's main source of fuel--affects blood sugar more than any other nutrient. Carbohydrate is in fruits, vegetables, milk, and yogurt.  It also is in breads, cereals, vegetables such as potatoes and corn, and sugary foods such as candy and cakes. Follow your meal plan to know how much carbohydrate to eat at each meal and snack. ? Aim for 30 minutes of exercise on most, preferably all, days of the week. Walking is a good choice. You also may want to do other activities, such as running, swimming, cycling, or playing tennis or team sports. Try to do muscle-strengthening exercises at least 2 times a week. ? Take your medicines exactly as prescribed. Call your doctor if you think you are having a problem with your medicine. You will get more details on the specific medicines your doctor prescribes. · Check your blood sugar as often as your doctor recommends. It is important to keep track of any symptoms you have, such as low blood sugar. Also tell your doctor if you have any changes in your activities, diet, or insulin use. · Talk to your doctor before you start taking aspirin every day. Aspirin can help certain people lower their risk of a heart attack or stroke. But taking aspirin isn't right for everyone, because it can cause serious bleeding. · Do not smoke. If you need help quitting, talk to your doctor about stop-smoking programs and medicines. These can increase your chances of quitting for good. · Keep your cholesterol and blood pressure at normal levels. You may need to take one or more medicines to reach your goals. Take them exactly as directed. Do not stop or change a medicine without talking to your doctor first.  When should you call for help? Call 911 anytime you think you may need emergency care. For example, call if:    · You passed out (lost consciousness), or you suddenly become very sleepy or confused. (You may have very low blood sugar.)   Call your doctor now or seek immediate medical care if:    · Your blood sugar is 300 mg/dL or is higher than the level your doctor has set for you.     · You have symptoms of low blood sugar, such as:  ? Sweating. ? Feeling nervous, shaky, and weak. ?  Extreme hunger and slight

## 2021-08-30 DIAGNOSIS — E11.9 TYPE 2 DIABETES MELLITUS WITHOUT COMPLICATION, WITHOUT LONG-TERM CURRENT USE OF INSULIN (HCC): ICD-10-CM

## 2021-08-30 RX ORDER — ASPIRIN 81 MG/1
TABLET ORAL
Qty: 100 TABLET | Refills: 1 | Status: SHIPPED | OUTPATIENT
Start: 2021-08-30 | End: 2022-05-25 | Stop reason: SDUPTHER

## 2021-08-30 NOTE — TELEPHONE ENCOUNTER
Health Maintenance   Topic Date Due    HIV screen  Never done    Hepatitis B vaccine (1 of 3 - Risk 3-dose series) Never done    DTaP/Tdap/Td vaccine (1 - Tdap) Never done    Cervical cancer screen  Never done    Diabetic microalbuminuria test  09/28/2020    Lipid screen  09/28/2020    Diabetic retinal exam  08/17/2021    Hepatitis C screen  02/17/2022 (Originally 1975)    Flu vaccine (1) 09/01/2021    Potassium monitoring  05/14/2022    Creatinine monitoring  05/14/2022    Diabetic foot exam  08/12/2022    A1C test (Diabetic or Prediabetic)  08/12/2022    Pneumococcal 0-64 years Vaccine (2 of 2 - PPSV23) 07/09/2042    COVID-19 Vaccine  Completed    Hepatitis A vaccine  Aged Out    Hib vaccine  Aged Out    Meningococcal (ACWY) vaccine  Aged Out             (applicable per patient's age: Cancer Screenings, Depression Screening, Fall Risk Screening, Immunizations)    Hemoglobin A1C (%)   Date Value   08/12/2021 7.8   05/14/2021 8.2 (H)   02/17/2021 8.2     Microalb/Crt.  Ratio (mcg/mg creat)   Date Value   09/28/2019 CANNOT BE CALCULATED     LDL Cholesterol (mg/dL)   Date Value   09/28/2019 85     AST (U/L)   Date Value   05/14/2021 17     ALT (U/L)   Date Value   05/14/2021 22     BUN (mg/dL)   Date Value   05/14/2021 11      (goal A1C is < 7)   (goal LDL is <100) need 30-50% reduction from baseline     BP Readings from Last 3 Encounters:   08/12/21 118/71   05/19/21 130/60   02/17/21 134/80    (goal /80)      All Future Testing planned in [facility]:  Lab Frequency Next Occurrence   Basic Metabolic Panel Once 73/54/6337   Microalbumin, Ur Once 08/19/2021   Lipid Panel Once 08/17/2021   TSH without Reflex Once 08/17/2021       Next Visit Date:  Future Appointments   Date Time Provider Daryl Guillaume   11/16/2021  9:00 AM Kye Camargo, APRN - C[facility]            Patient Active Problem List:     Type 2 diabetes mellitus without complication, without long-term current use of insulin (HCC)     Tobacco abuse     Single kidney     Hyperlipidemia     Syncope     PCOS (polycystic ovarian syndrome)     Gastroesophageal reflux disease     Class 2 obesity with body mass index (BMI) of 37.0 to 37.9 in adult

## 2021-09-14 DIAGNOSIS — J30.9 CHRONIC ALLERGIC RHINITIS: ICD-10-CM

## 2021-09-14 RX ORDER — MONTELUKAST SODIUM 10 MG/1
TABLET ORAL
Qty: 30 TABLET | Refills: 2 | Status: SHIPPED | OUTPATIENT
Start: 2021-09-14 | End: 2022-02-22

## 2021-09-14 NOTE — TELEPHONE ENCOUNTER
Health Maintenance   Topic Date Due    HIV screen  Never done    Hepatitis B vaccine (1 of 3 - Risk 3-dose series) Never done    DTaP/Tdap/Td vaccine (1 - Tdap) Never done    Cervical cancer screen  Never done    Diabetic microalbuminuria test  09/28/2020    Lipid screen  09/28/2020    Flu vaccine (1) 09/01/2021    Diabetic retinal exam  08/17/2021    Hepatitis C screen  02/17/2022 (Originally 1975)    Potassium monitoring  05/14/2022    Creatinine monitoring  05/14/2022    Diabetic foot exam  08/12/2022    A1C test (Diabetic or Prediabetic)  08/12/2022    Pneumococcal 0-64 years Vaccine (2 of 2 - PPSV23) 07/09/2042    COVID-19 Vaccine  Completed    Hepatitis A vaccine  Aged Out    Hib vaccine  Aged Out    Meningococcal (ACWY) vaccine  Aged Out             (applicable per patient's age: Cancer Screenings, Depression Screening, Fall Risk Screening, Immunizations)    Hemoglobin A1C (%)   Date Value   08/12/2021 7.8   05/14/2021 8.2 (H)   02/17/2021 8.2     Microalb/Crt.  Ratio (mcg/mg creat)   Date Value   09/28/2019 CANNOT BE CALCULATED     LDL Cholesterol (mg/dL)   Date Value   09/28/2019 85     AST (U/L)   Date Value   05/14/2021 17     ALT (U/L)   Date Value   05/14/2021 22     BUN (mg/dL)   Date Value   05/14/2021 11      (goal A1C is < 7)   (goal LDL is <100) need 30-50% reduction from baseline     BP Readings from Last 3 Encounters:   08/12/21 118/71   05/19/21 130/60   02/17/21 134/80    (goal /80)      All Future Testing planned in [facility]:  Lab Frequency Next Occurrence   Basic Metabolic Panel Once 45/56/4487   Microalbumin, Ur Once 08/19/2021   Lipid Panel Once 08/17/2021   TSH without Reflex Once 08/17/2021       Next Visit Date:  Future Appointments   Date Time Provider Daryl Guillaume   11/16/2021  9:00 AM Kye Kirk, APRN - CNP [facility]            Patient Active Problem List:     Type 2 diabetes mellitus without complication, without long-term current use of insulin (HCC)     Tobacco abuse     Single kidney     Hyperlipidemia     Syncope     PCOS (polycystic ovarian syndrome)     Gastroesophageal reflux disease     Class 2 obesity with body mass index (BMI) of 37.0 to 37.9 in adult

## 2022-02-03 ENCOUNTER — TELEPHONE (OUTPATIENT)
Dept: PRIMARY CARE CLINIC | Age: 47
End: 2022-02-03

## 2022-02-22 DIAGNOSIS — J30.9 CHRONIC ALLERGIC RHINITIS: ICD-10-CM

## 2022-02-22 RX ORDER — MONTELUKAST SODIUM 10 MG/1
TABLET ORAL
Qty: 90 TABLET | Refills: 1 | Status: SHIPPED | OUTPATIENT
Start: 2022-02-22 | End: 2022-05-25 | Stop reason: SDUPTHER

## 2022-02-22 NOTE — TELEPHONE ENCOUNTER
Health Maintenance   Topic Date Due    Hepatitis C screen  Never done    Depression Monitoring  Never done    HIV screen  Never done    Hepatitis B vaccine (1 of 3 - Risk 3-dose series) Never done    DTaP/Tdap/Td vaccine (1 - Tdap) Never done    Cervical cancer screen  Never done    Diabetic microalbuminuria test  09/28/2020    Lipid screen  09/28/2020    Diabetic retinal exam  08/17/2021    Flu vaccine (1) 09/01/2021    Potassium monitoring  05/14/2022    Creatinine monitoring  05/14/2022    Diabetic foot exam  08/12/2022    A1C test (Diabetic or Prediabetic)  08/12/2022    Pneumococcal 0-64 years Vaccine (2 of 2 - PPSV23) 07/09/2042    COVID-19 Vaccine  Completed    Hepatitis A vaccine  Aged Out    Hib vaccine  Aged Out    Meningococcal (ACWY) vaccine  Aged Out             (applicable per patient's age: Cancer Screenings, Depression Screening, Fall Risk Screening, Immunizations)    Hemoglobin A1C (%)   Date Value   08/12/2021 7.8   05/14/2021 8.2 (H)   02/17/2021 8.2     Microalb/Crt. Ratio (mcg/mg creat)   Date Value   09/28/2019 CANNOT BE CALCULATED     LDL Cholesterol (mg/dL)   Date Value   09/28/2019 85     AST (U/L)   Date Value   05/14/2021 17     ALT (U/L)   Date Value   05/14/2021 22     BUN (mg/dL)   Date Value   05/14/2021 11      (goal A1C is < 7)   (goal LDL is <100) need 30-50% reduction from baseline     BP Readings from Last 3 Encounters:   08/12/21 118/71   05/19/21 130/60   02/17/21 134/80    (goal /80)      All Future Testing planned in [facility]:  Lab Frequency Next Occurrence   Basic Metabolic Panel Once 83/34/1399   Microalbumin, Ur Once 02/17/2022   Lipid Panel Once 02/17/2022   TSH without Reflex Once 02/17/2022       Next Visit Date:  No future appointments.          Patient Active Problem List:     Type 2 diabetes mellitus without complication, without long-term current use of insulin (HCC)     Tobacco abuse     Single kidney     Hyperlipidemia     Syncope     PCOS (polycystic ovarian syndrome)     Gastroesophageal reflux disease     Class 2 obesity with body mass index (BMI) of 37.0 to 37.9 in adult

## 2022-05-25 ENCOUNTER — HOSPITAL ENCOUNTER (OUTPATIENT)
Age: 47
Setting detail: SPECIMEN
Discharge: HOME | End: 2022-05-25
Payer: COMMERCIAL

## 2022-05-25 ENCOUNTER — OFFICE VISIT (OUTPATIENT)
Dept: PRIMARY CARE CLINIC | Age: 47
End: 2022-05-25
Payer: COMMERCIAL

## 2022-05-25 VITALS
BODY MASS INDEX: 36.14 KG/M2 | SYSTOLIC BLOOD PRESSURE: 124 MMHG | HEART RATE: 105 BPM | TEMPERATURE: 98.3 F | DIASTOLIC BLOOD PRESSURE: 82 MMHG | OXYGEN SATURATION: 98 % | RESPIRATION RATE: 18 BRPM | WEIGHT: 204 LBS

## 2022-05-25 DIAGNOSIS — E11.9 TYPE 2 DIABETES MELLITUS WITHOUT COMPLICATION, WITHOUT LONG-TERM CURRENT USE OF INSULIN (HCC): ICD-10-CM

## 2022-05-25 DIAGNOSIS — K21.9 GASTROESOPHAGEAL REFLUX DISEASE, UNSPECIFIED WHETHER ESOPHAGITIS PRESENT: ICD-10-CM

## 2022-05-25 DIAGNOSIS — N30.01 ACUTE CYSTITIS WITH HEMATURIA: ICD-10-CM

## 2022-05-25 DIAGNOSIS — N30.01 ACUTE CYSTITIS WITH HEMATURIA: Primary | ICD-10-CM

## 2022-05-25 DIAGNOSIS — J30.9 CHRONIC ALLERGIC RHINITIS: ICD-10-CM

## 2022-05-25 LAB
BILIRUBIN, POC: ABNORMAL
BLOOD URINE, POC: ABNORMAL
CLARITY, POC: ABNORMAL
COLOR, POC: YELLOW
GLUCOSE URINE, POC: 1000
HBA1C MFR BLD: 8 %
KETONES, POC: ABNORMAL
LEUKOCYTE EST, POC: ABNORMAL
NITRITE, POC: ABNORMAL
PH, POC: 6.5
PROTEIN, POC: 100
SPECIFIC GRAVITY, POC: 1.02
UROBILINOGEN, POC: 1

## 2022-05-25 PROCEDURE — 83036 HEMOGLOBIN GLYCOSYLATED A1C: CPT | Performed by: NURSE PRACTITIONER

## 2022-05-25 PROCEDURE — 99214 OFFICE O/P EST MOD 30 MIN: CPT | Performed by: NURSE PRACTITIONER

## 2022-05-25 PROCEDURE — 3052F HG A1C>EQUAL 8.0%<EQUAL 9.0%: CPT | Performed by: NURSE PRACTITIONER

## 2022-05-25 PROCEDURE — 87186 SC STD MICRODIL/AGAR DIL: CPT

## 2022-05-25 PROCEDURE — 81003 URINALYSIS AUTO W/O SCOPE: CPT | Performed by: NURSE PRACTITIONER

## 2022-05-25 PROCEDURE — 87077 CULTURE AEROBIC IDENTIFY: CPT

## 2022-05-25 PROCEDURE — 87086 URINE CULTURE/COLONY COUNT: CPT

## 2022-05-25 RX ORDER — OMEPRAZOLE 40 MG/1
40 CAPSULE, DELAYED RELEASE ORAL
Qty: 90 CAPSULE | Refills: 1 | Status: SHIPPED | OUTPATIENT
Start: 2022-05-25

## 2022-05-25 RX ORDER — SULFAMETHOXAZOLE AND TRIMETHOPRIM 800; 160 MG/1; MG/1
1 TABLET ORAL 2 TIMES DAILY
Qty: 6 TABLET | Refills: 0 | Status: SHIPPED | OUTPATIENT
Start: 2022-05-25 | End: 2022-05-28

## 2022-05-25 RX ORDER — METOPROLOL SUCCINATE 25 MG/1
TABLET, EXTENDED RELEASE ORAL
Qty: 45 TABLET | Refills: 1 | Status: SHIPPED | OUTPATIENT
Start: 2022-05-25

## 2022-05-25 RX ORDER — ASPIRIN 81 MG/1
TABLET ORAL
Qty: 100 TABLET | Refills: 1 | Status: SHIPPED | OUTPATIENT
Start: 2022-05-25

## 2022-05-25 RX ORDER — MONTELUKAST SODIUM 10 MG/1
TABLET ORAL
Qty: 90 TABLET | Refills: 1 | Status: SHIPPED | OUTPATIENT
Start: 2022-05-25

## 2022-05-25 RX ORDER — LISINOPRIL 2.5 MG/1
TABLET ORAL
Qty: 90 TABLET | Refills: 1 | Status: SHIPPED | OUTPATIENT
Start: 2022-05-25

## 2022-05-25 RX ORDER — ATORVASTATIN CALCIUM 40 MG/1
40 TABLET, FILM COATED ORAL DAILY
Qty: 90 TABLET | Refills: 3 | Status: SHIPPED | OUTPATIENT
Start: 2022-05-25

## 2022-05-25 RX ORDER — PHENAZOPYRIDINE HYDROCHLORIDE 200 MG/1
200 TABLET, FILM COATED ORAL 3 TIMES DAILY PRN
Qty: 15 TABLET | Refills: 0 | Status: SHIPPED | OUTPATIENT
Start: 2022-05-25 | End: 2022-05-30

## 2022-05-25 SDOH — ECONOMIC STABILITY: FOOD INSECURITY: WITHIN THE PAST 12 MONTHS, THE FOOD YOU BOUGHT JUST DIDN'T LAST AND YOU DIDN'T HAVE MONEY TO GET MORE.: NEVER TRUE

## 2022-05-25 SDOH — ECONOMIC STABILITY: FOOD INSECURITY: WITHIN THE PAST 12 MONTHS, YOU WORRIED THAT YOUR FOOD WOULD RUN OUT BEFORE YOU GOT MONEY TO BUY MORE.: NEVER TRUE

## 2022-05-25 ASSESSMENT — PATIENT HEALTH QUESTIONNAIRE - PHQ9
5. POOR APPETITE OR OVEREATING: 0
7. TROUBLE CONCENTRATING ON THINGS, SUCH AS READING THE NEWSPAPER OR WATCHING TELEVISION: 0
4. FEELING TIRED OR HAVING LITTLE ENERGY: 0
SUM OF ALL RESPONSES TO PHQ QUESTIONS 1-9: 0
8. MOVING OR SPEAKING SO SLOWLY THAT OTHER PEOPLE COULD HAVE NOTICED. OR THE OPPOSITE, BEING SO FIGETY OR RESTLESS THAT YOU HAVE BEEN MOVING AROUND A LOT MORE THAN USUAL: 0
9. THOUGHTS THAT YOU WOULD BE BETTER OFF DEAD, OR OF HURTING YOURSELF: 0
SUM OF ALL RESPONSES TO PHQ QUESTIONS 1-9: 0
10. IF YOU CHECKED OFF ANY PROBLEMS, HOW DIFFICULT HAVE THESE PROBLEMS MADE IT FOR YOU TO DO YOUR WORK, TAKE CARE OF THINGS AT HOME, OR GET ALONG WITH OTHER PEOPLE: 0
2. FEELING DOWN, DEPRESSED OR HOPELESS: 0
SUM OF ALL RESPONSES TO PHQ9 QUESTIONS 1 & 2: 0
3. TROUBLE FALLING OR STAYING ASLEEP: 0
1. LITTLE INTEREST OR PLEASURE IN DOING THINGS: 0
6. FEELING BAD ABOUT YOURSELF - OR THAT YOU ARE A FAILURE OR HAVE LET YOURSELF OR YOUR FAMILY DOWN: 0
SUM OF ALL RESPONSES TO PHQ QUESTIONS 1-9: 0
SUM OF ALL RESPONSES TO PHQ QUESTIONS 1-9: 0

## 2022-05-25 ASSESSMENT — ENCOUNTER SYMPTOMS
RESPIRATORY NEGATIVE: 1
GASTROINTESTINAL NEGATIVE: 1
EYES NEGATIVE: 1

## 2022-05-25 ASSESSMENT — SOCIAL DETERMINANTS OF HEALTH (SDOH): HOW HARD IS IT FOR YOU TO PAY FOR THE VERY BASICS LIKE FOOD, HOUSING, MEDICAL CARE, AND HEATING?: NOT HARD AT ALL

## 2022-05-25 NOTE — PROGRESS NOTES
[facility] PHYSICIANS  [facility], [address] [facility] PRIMARY CARE  [address] [facility] [address] [place] [address]  Dept: [phone]  Dept Fax: [phone]      Name: Rebecca Swan  : 1975         Chief Complaint:     Chief Complaint   Patient presents with    Dysuria     x 5 days.  Urinary Frequency     x 5 days. History of Present Illness:      Rebecca Swan is a 42 y.o.  female who presents with Dysuria (x 5 days. ) and Urinary Frequency (x 5 days. )    Delia Paniagua is here today for dysuria for 4 days. She has had some cloudy urine. She has been having frequency and urgency. She states urine has been cloudy but has no hematuria. She has some mild back pain today. No fever. She has not taking any OTC medications. She describes pain as a burning at the end of her stream.      Has been off of her diabetic medications for a couple months due to not coming in or calling for refills. She requested her medication refills. She states she has been busy at work and was unable to get in for her appointment. She has been off of her GERD medications also and having increased symptoms. Past Medical History:     Past Medical History:   Diagnosis Date    Anxiety     Depression     GERD (gastroesophageal reflux disease)     H/O cardiovascular stress test 2018    Largerly normal myocardial perfusion imaging with soft tissue artifact but without evidence of signinfcant myocardial ischemia or infarction. EF 75%. The pt duke treadmill score is 6.    H/O echocardiogram 2018    EF >70%. The LV wallt thickness is mildly incrased.  H/O tilt table evaluation 2018    Borderline abnormal head upright tilt table study. Although the pt heart rate, blood pressure and symptoms were suggestive of orthostatic intolerance. Therefore, if clinically suspicion remains high, a trial of empiric trreatment and/or re-testing may be indicated.      Hyperlipidemia 2010    Hypertension 2009    Kidney dysfunction     pt only has one kidney    Obesity 1999    Type 2 [facility] mellitus without complication ([facility] 0355      Reviewed all health maintenance requirements and ordered appropriate tests  Health Maintenance Due   Topic Date Due    Cervical cancer screen  Never done    Diabetic microalbuminuria test  09/28/2020    Lipids  09/28/2020    Diabetic retinal exam  08/17/2021    Depression Monitoring  02/17/2022       Past Surgical History:     Past Surgical History:   Procedure Laterality Date    BLADDER SUSPENSION      CARPAL TUNNEL RELEASE      CHOLECYSTECTOMY      DILATION AND CURETTAGE OF UTERUS  2007    KIDNEY REMOVAL      left        Medications:       Prior to Admission medications    Medication Sig Start Date End Date Taking?  Authorizing Provider   omeprazole (PRILOSEC) 40 MG delayed release capsule Take 1 capsule by mouth every morning (before breakfast) 5/25/22  Yes RONN Cifuentes CNP   SITagliptin (JANUVIA) 100 MG tablet TAKE 1 TABLET BY MOUTH EVERY DAY 5/25/22  Yes RONN Cifuentes CNP   montelukast (SINGULAIR) 10 MG tablet TAKE 1 TABLET BY MOUTH EVERY DAY 5/25/22  Yes RONN Cifuentes CNP   metoprolol succinate (TOPROL XL) 25 MG extended release tablet TAKE 1/2 TABLET BY MOUTH ONCE DAILY 5/25/22  Yes RONN Cifuentes CNP   metFORMIN (GLUCOPHAGE) 1000 MG tablet TAKE 1 TABLET BY MOUTH TWICE A DAY WITH MEALS 5/25/22  Yes RONN Cifuentes CNP   lisinopril (PRINIVIL;ZESTRIL) 2.5 MG tablet TAKE 1 TABLET BY MOUTH EVERY DAY 5/25/22  Yes RONN Cifuentes CNP   aspirin ([facility]S ASPIRIN ADULT LOW STRENGTH) 81 MG EC tablet TAKE 1 TABLET BY MOUTH EVERY DAY 5/25/22  Yes RONN Cifuentes CNP   atorvastatin (LIPITOR) 40 MG tablet Take 1 tablet by mouth daily 5/25/22  Yes RONN Cifuentes CNP   sulfamethoxazole-trimethoprim (BACTRIM DS) 800-160 MG per tablet Take 1 tablet by mouth 2 times daily for 3 days 5/25/22 5/28/22 Yes RONN Edwards CNP   phenazopyridine (PYRIDIUM) 200 MG tablet Take 1 tablet by mouth 3 times daily as needed for Pain 5/25/22 5/30/22 Yes RONN Edwards CNP   dapagliflozin (FARXIGA) 5 MG tablet Take 1 tablet by mouth every morning 8/12/21  Yes RONN Hawkins CNP   dapagliflozin (FARXIGA) 10 MG tablet Take 1 tablet by mouth every morning 9/12/21  Yes RONN Hawkins CNP   glimepiride (AMARYL) 4 MG tablet Take 1 tablet by mouth every morning (before breakfast) 8/12/21  Yes RONN Hawkins CNP   fluticasone (FLONASE) 50 MCG/ACT nasal spray 1 spray by Each Nostril route daily 10/14/20  Yes RONN Hawkins CNP   albuterol sulfate  (90 Base) MCG/ACT inhaler Inhale 2 puffs into the lungs 4 times daily as needed for Wheezing or Shortness of Breath 12/31/19  Yes RONN Ivey CNP   ibuprofen (ADVIL;MOTRIN) 200 MG tablet Take 800 mg by mouth every 6 hours as needed for Pain   Yes Historical Provider, MD   CRANBERRY-VITAMIN C PO Take by mouth daily    Yes Historical Provider, MD        Allergies:       Pcn [penicillins]    Social History:     Tobacco:    reports that she has been smoking cigarettes. She has a 25.00 pack-year smoking history. She has never used smokeless tobacco.  Alcohol:      reports no history of alcohol use. Drug Use:  reports no history of drug use.     Family History:     Family History   Problem Relation Age of Onset    High Cholesterol Mother     Other Brother     Alcohol Abuse Maternal Grandmother     Cancer Maternal Grandmother     Depression Maternal Grandmother     Diabetes Maternal Grandmother     Heart Disease Maternal Grandmother     High Blood Pressure Maternal Grandmother     High Cholesterol Maternal Grandmother     Mental Illness Maternal Grandmother     Obesity Maternal Grandmother     Other Maternal Grandfather        Review of Systems:     Positive and Negative as described in HPI    Review of Systems   Constitutional: Negative. Negative for fever. HENT: Negative. Eyes: Negative. Respiratory: Negative. Cardiovascular: Negative. Gastrointestinal: Negative. Endocrine: Negative. Genitourinary: Positive for dysuria, flank pain, frequency and urgency. Skin: Negative. Allergic/Immunologic: Positive for environmental allergies. Neurological: Negative. Hematological: Negative. Psychiatric/Behavioral: Negative. Physical Exam:   Vitals:  /82   Pulse (!) 105   Temp 98.3 °F (36.8 °C) (Temporal)   Resp 18   Wt 204 lb (92.5 kg)   SpO2 98%   BMI 36.14 kg/m²     Physical Exam  Vitals and nursing note reviewed. Constitutional:       General: She is not in acute distress. Appearance: Normal appearance. She is obese. HENT:      Head: Normocephalic. Right Ear: External ear normal.      Left Ear: External ear normal.      Nose: Nose normal.      Mouth/Throat:      Mouth: Mucous membranes are moist.      Pharynx: Oropharynx is clear. Eyes:      Conjunctiva/sclera: Conjunctivae normal.      Pupils: Pupils are equal, round, and reactive to light. Cardiovascular:      Rate and Rhythm: Normal rate and regular rhythm. Heart sounds: Normal heart sounds. Pulmonary:      Effort: Pulmonary effort is normal.      Breath sounds: Normal breath sounds. Abdominal:      Tenderness: There is no right CVA tenderness or left CVA tenderness. Musculoskeletal:         General: Normal range of motion. Cervical back: Normal range of motion. Skin:     General: Skin is warm. Capillary Refill: Capillary refill takes less than 2 seconds. Neurological:      General: No focal deficit present. Mental Status: She is alert.    Psychiatric:         Mood and Affect: Mood normal.         Behavior: Behavior normal.         Data:     Lab Results   Component Value Date     05/14/2021    K 4.2 05/14/2021     05/14/2021    CO2 21 05/14/2021    BUN 11 05/14/2021    CREATININE 0.65 05/14/2021    GLUCOSE 103 05/14/2021    PROT 7.0 02/24/2020    LABALBU 3.9 02/24/2020    BILITOT 0.25 02/24/2020    ALKPHOS 77 02/24/2020    AST 17 05/14/2021    ALT 22 05/14/2021     Lab Results   Component Value Date    WBC 11.2 05/14/2021    RBC 4.54 05/14/2021    HGB 14.9 05/14/2021    HCT 44.3 05/14/2021    MCV 97.6 05/14/2021    MCH 32.8 05/14/2021    MCHC 33.6 05/14/2021    RDW 12.2 05/14/2021     05/14/2021    MPV 9.8 05/14/2021     Lab Results   Component Value Date    TSH 4.10 09/26/2019     Lab Results   Component Value Date    CHOL 182 09/28/2019    HDL 29 09/28/2019    LABA1C 8.0 05/25/2022       Assessment/Plan:      Diagnosis Orders   1. Acute cystitis with hematuria  POCT Urinalysis No Micro (Auto)    Culture, Urine    sulfamethoxazole-trimethoprim (BACTRIM DS) 800-160 MG per tablet    phenazopyridine (PYRIDIUM) 200 MG tablet   2. Gastroesophageal reflux disease, unspecified whether esophagitis present  omeprazole (PRILOSEC) 40 MG delayed release capsule   3. Type 2 diabetes mellitus without complication, without long-term current use of insulin (HCC)  SITagliptin (JANUVIA) 100 MG tablet    metFORMIN (GLUCOPHAGE) 1000 MG tablet    lisinopril (PRINIVIL;ZESTRIL) 2.5 MG tablet    aspirin (CVS ASPIRIN ADULT LOW STRENGTH) 81 MG EC tablet    atorvastatin (LIPITOR) 40 MG tablet    POCT glycosylated hemoglobin (Hb A1C)   4. Chronic allergic rhinitis  montelukast (SINGULAIR) 10 MG tablet     Acute Cystitis with hematuria-Start Bactrim as prescribed. Increase fluids. Will call with urine culture results. Pyridium as needed for dysuria. GERD-Prilosec refilled. Discussed lifestyle modifications. Type 2 Diabetes-Medications refilled and to be resumed. A1C 8.0 today. Pt. Informed she was spilling glucose in her urine. Lifestyle modifications encouraged. Will see in 3 months for close follow up and repeat A1C. Allergies-Singulair daily. Avoid allergens.     1.

## 2022-05-25 NOTE — PATIENT INSTRUCTIONS
SURVEY:     You may be receiving a survey from [employer] regarding your visit today. Please complete the survey to enable us to provide the highest quality of care to you and your family. If you cannot score us a very good on any question, please call the office to discuss how we could have made your experience a very good one. Thank you. Heidy Lindsay, APRN-NILESH Balderas, NILESH Chinchilla, ROSARION  Guillermo Clark, GABI Alonzo, CMA  Luly, CMA  Ankita, PCA    Patient Education        Urinary Tract Infection (UTI) in Women: Care Instructions  Overview     A urinary tract infection, or UTI, is a general term for an infection anywhere between the kidneys and the urethra (where urine comes out). Most UTIs arebladder infections. They often cause pain or burning when you urinate. UTIs are caused by bacteria and can be cured with antibiotics. Be sure tocomplete your treatment so that the infection does not get worse. Follow-up care is a key part of your treatment and safety. Be sure to make and go to all appointments, and call your doctor if you are having problems. It's also a good idea to know your test results and keep alist of the medicines you take. How can you care for yourself at home?  Take your antibiotics as directed. Do not stop taking them just because you feel better. You need to take the full course of antibiotics.  Drink extra water and other fluids for the next day or two. This will help make the urine less concentrated and help wash out the bacteria that are causing the infection. (If you have kidney, heart, or liver disease and have to limit fluids, talk with your doctor before you increase the amount of fluids you drink.)   Avoid drinks that are carbonated or have caffeine. They can irritate the bladder.  Urinate often. Try to empty your bladder each time.  To relieve pain, take a hot bath or lay a heating pad set on low over your lower belly or genital area.  Never go to sleep with a heating pad in place. To prevent UTIs   Drink plenty of water each day. This helps you urinate often, which clears bacteria from your system. (If you have kidney, heart, or liver disease and have to limit fluids, talk with your doctor before you increase the amount of fluids you drink.)   Urinate when you need to.  If you are sexually active, urinate right after you have sex.  Change sanitary pads often.  Avoid douches, bubble baths, feminine hygiene sprays, and other feminine hygiene products that have deodorants.  After going to the bathroom, wipe from front to back. When should you call for help? Call your doctor now or seek immediate medical care if:     Symptoms such as fever, chills, nausea, or vomiting get worse or appear for the first time.      You have new pain in your back just below your rib cage. This is called flank pain.      There is new blood or pus in your urine.      You have any problems with your antibiotic medicine. Watch closely for changes in your health, and be sure to contact your doctor if:     You are not getting better after taking an antibiotic for 2 days.      Your symptoms go away but then come back. Where can you learn more? Go to https://D[employer]Sparq [employer]. org and sign in to your [employer] account. Enter H725 in the [facility] box to learn more about \"Urinary Tract Infection (UTI) in Women: Care Instructions. \"     If you do not have an account, please click on the \"Sign Up Now\" link. Current as of: October 18, 2021               Content Version: 13.2  © 2006-2022 [employer]. Care instructions adapted under license by [facility] ([facility]). If you have questions about a medical condition or this instruction, always ask your healthcare professional. [place] [address] any warranty or liability for your use of this information.          Patient Education        Allergies: Care Instructions  Overview     Allergies occur when your body's defense system (immune system) overreacts to certain substances. The immune system treats a harmless substance as if it were a harmful germ or virus. Many things can make this happen. These includepollens, medicine, food, dust, animal dander, and mold. Allergies can be mild or severe. Mild allergies can be managed with hometreatment. But medicine may be needed to prevent problems. Managing your allergies is an important part of staying healthy. Your doctor may suggest that you have allergy testing to help find out what is causing yourallergies. Severe allergies can cause reactions that affect your whole body (anaphylactic reactions). Your doctor may prescribe a shot of epinephrine to carry with you in case you have a severe reaction. Learn how to give yourself the shot andkeep it with you at all times. Make sure it is not . Follow-up care is a key part of your treatment and safety. Be sure to make and go to all appointments, and call your doctor if you are having problems. It's also a good idea to know your test results and keep alist of the medicines you take. How can you care for yourself at home?  If you have been told by your doctor that dust or dust mites are causing your allergy, decrease the dust around your bed:  ? Wash sheets, pillowcases, and other bedding in hot water every week. ? Use dust-proof covers for pillows, duvets, and mattresses. Avoid plastic covers because they tear easily and do not \"breathe. \" Wash as instructed on the label. ? Do not use any blankets and pillows that you do not need. ? Use blankets that you can wash in your washing machine. ? Consider removing drapes and carpets, which attract and hold dust, from your bedroom.  If you are allergic to house dust and mites, do not use home humidifiers. Your doctor can suggest ways you can control dust and mites.  Look for signs of cockroaches. Cockroaches cause allergic reactions.  Use cockroach baits to get rid of them. Then, clean your home well. Cockroaches like areas where grocery bags, newspapers, empty bottles, or cardboard boxes are stored. Do not keep these inside your home, and keep trash and food containers sealed. Seal off any spots where cockroaches might enter your home.  If you are allergic to mold, get rid of furniture, rugs, and drapes that smell musty. Check for mold in the bathroom.  If you are allergic to outdoor pollen or mold spores, use air-conditioning. Change or clean all filters every month. Keep windows closed.  If you are allergic to pollen, stay inside when pollen counts are high. Use a vacuum  with a HEPA filter or a double-thickness filter at least two times each week.  Stay inside when air pollution is bad. Avoid paint fumes, perfumes, and other strong odors.  Avoid conditions that make your allergies worse. Stay away from smoke. Do not smoke or let anyone else smoke in your house. Do not use fireplaces or wood-burning stoves.  If you are allergic to your pets, change the air filter in your furnace every month. Use high-efficiency filters.  If you are allergic to pet dander, keep pets outside or out of your bedroom. Old carpet and cloth furniture can hold a lot of animal dander. You may need to replace them. When should you call for help? Give an epinephrine shot if:     You think you are having a severe allergic reaction.      You have symptoms in more than one body area, such as mild nausea and an itchy mouth. After giving an epinephrine shot call 911, even if you feel better. Call 911 if:     You have symptoms of a severe allergic reaction. These may include:  ? Sudden raised, red areas (hives) all over your body. ? Swelling of the throat, mouth, lips, or tongue. ? Trouble breathing. ? Passing out (losing consciousness).  Or you may feel very lightheaded or suddenly feel weak, confused, or restless.      You have been given an epinephrine shot, even if you feel better. Call your doctor now or seek immediate medical care if:     You have symptoms of an allergic reaction, such as:  ? A rash or hives (raised, red areas on the skin). ? Itching. ? Swelling. ? Belly pain, nausea, or vomiting. Watch closely for changes in your health, and be sure to contact your doctor if:     You do not get better as expected. Where can you learn more? Go to https://Consorte MediapeCertaliaeb.[employer]. org and sign in to your [employer] account. Enter Q518 in the [employer] box to learn more about \"Allergies: Care Instructions. \"     If you do not have an account, please click on the \"Sign Up Now\" link. Current as of: February 10, 2021               Content Version: 13.2  © 5739-6364 Healthwise, Incorporated. Care instructions adapted under license by [facility] ([facility]). If you have questions about a medical condition or this instruction, always ask your healthcare professional. [address] any warranty or liability for your use of this information.

## 2022-05-28 LAB
CULTURE: ABNORMAL
SPECIMEN DESCRIPTION: ABNORMAL

## 2022-05-31 ENCOUNTER — TELEPHONE (OUTPATIENT)
Dept: PRIMARY CARE CLINIC | Age: 47
End: 2022-05-31

## 2022-05-31 NOTE — TELEPHONE ENCOUNTER
----- Message from RONN Cifuentes CNP sent at 5/31/2022  8:13 AM EDT -----  Please notify patient of urine culture results. She was prescribed Bactrim which is sensitive to bacteria. Ask her if she is feeling better.   Thanks Carlton

## 2022-07-20 NOTE — PROGRESS NOTES
Patient received NPO instructions and pre-op medication instructions to be taken on the day of the procedure with a small sip of water. Pt was also given pre-op eye drop instructions from Dr. Jo Alvarez office.

## 2022-07-22 ENCOUNTER — ANESTHESIA EVENT (OUTPATIENT)
Dept: OPERATING ROOM | Age: 47
End: 2022-07-22
Payer: COMMERCIAL

## 2022-07-24 DIAGNOSIS — J45.901 ALLERGIC BRONCHITIS WITH ACUTE EXACERBATION: ICD-10-CM

## 2022-07-24 PROBLEM — H25.042 POSTERIOR SUBCAPSULAR AGE-RELATED CATARACT OF LEFT EYE: Chronic | Status: ACTIVE | Noted: 2022-07-24

## 2022-07-25 ENCOUNTER — HOSPITAL ENCOUNTER (OUTPATIENT)
Age: 47
Setting detail: OUTPATIENT SURGERY
Discharge: HOME | End: 2022-07-25
Attending: OPHTHALMOLOGY | Admitting: OPHTHALMOLOGY
Payer: COMMERCIAL

## 2022-07-25 ENCOUNTER — ANESTHESIA (OUTPATIENT)
Dept: OPERATING ROOM | Age: 47
End: 2022-07-25
Payer: COMMERCIAL

## 2022-07-25 VITALS
HEIGHT: 62 IN | HEART RATE: 76 BPM | DIASTOLIC BLOOD PRESSURE: 63 MMHG | RESPIRATION RATE: 18 BRPM | WEIGHT: 203 LBS | BODY MASS INDEX: 37.36 KG/M2 | TEMPERATURE: 97 F | SYSTOLIC BLOOD PRESSURE: 96 MMHG | OXYGEN SATURATION: 98 %

## 2022-07-25 PROBLEM — H25.042 POSTERIOR SUBCAPSULAR AGE-RELATED CATARACT OF LEFT EYE: Chronic | Status: RESOLVED | Noted: 2022-07-24 | Resolved: 2022-07-25

## 2022-07-25 LAB — GLUCOSE BLD-MCNC: 199 MG/DL (ref 74–100)

## 2022-07-25 PROCEDURE — 7100000011 HC PHASE II RECOVERY - ADDTL 15 MIN: Performed by: OPHTHALMOLOGY

## 2022-07-25 PROCEDURE — 3600000013 HC SURGERY LEVEL 3 ADDTL 15MIN: Performed by: OPHTHALMOLOGY

## 2022-07-25 PROCEDURE — 2500000003 HC RX 250 WO HCPCS: Performed by: OPHTHALMOLOGY

## 2022-07-25 PROCEDURE — 6370000000 HC RX 637 (ALT 250 FOR IP): Performed by: OPHTHALMOLOGY

## 2022-07-25 PROCEDURE — 7100000010 HC PHASE II RECOVERY - FIRST 15 MIN: Performed by: OPHTHALMOLOGY

## 2022-07-25 PROCEDURE — 3600000003 HC SURGERY LEVEL 3 BASE: Performed by: OPHTHALMOLOGY

## 2022-07-25 PROCEDURE — V2632 POST CHMBR INTRAOCULAR LENS: HCPCS | Performed by: OPHTHALMOLOGY

## 2022-07-25 PROCEDURE — 6360000002 HC RX W HCPCS: Performed by: NURSE ANESTHETIST, CERTIFIED REGISTERED

## 2022-07-25 PROCEDURE — 3700000001 HC ADD 15 MINUTES (ANESTHESIA): Performed by: OPHTHALMOLOGY

## 2022-07-25 PROCEDURE — 6360000002 HC RX W HCPCS: Performed by: OPHTHALMOLOGY

## 2022-07-25 PROCEDURE — 3700000000 HC ANESTHESIA ATTENDED CARE: Performed by: OPHTHALMOLOGY

## 2022-07-25 PROCEDURE — 82947 ASSAY GLUCOSE BLOOD QUANT: CPT

## 2022-07-25 PROCEDURE — 2709999900 HC NON-CHARGEABLE SUPPLY: Performed by: OPHTHALMOLOGY

## 2022-07-25 PROCEDURE — 2580000003 HC RX 258: Performed by: NURSE ANESTHETIST, CERTIFIED REGISTERED

## 2022-07-25 DEVICE — LENS INTOCU L12.5 MM OD6 MM +21.5 D HYDROPHOBIC ENVISTA: Type: IMPLANTABLE DEVICE | Site: EYE | Status: FUNCTIONAL

## 2022-07-25 RX ORDER — ALBUTEROL SULFATE 90 UG/1
2 AEROSOL, METERED RESPIRATORY (INHALATION) 4 TIMES DAILY PRN
Qty: 1 EACH | Refills: 3 | Status: SHIPPED | OUTPATIENT
Start: 2022-07-25

## 2022-07-25 RX ORDER — TETRACAINE HYDROCHLORIDE 5 MG/ML
1 SOLUTION OPHTHALMIC SEE ADMIN INSTRUCTIONS
Status: DISCONTINUED | OUTPATIENT
Start: 2022-07-25 | End: 2022-07-25 | Stop reason: HOSPADM

## 2022-07-25 RX ORDER — SODIUM CHLORIDE 0.9 % (FLUSH) 0.9 %
5-40 SYRINGE (ML) INJECTION PRN
Status: DISCONTINUED | OUTPATIENT
Start: 2022-07-25 | End: 2022-07-25 | Stop reason: HOSPADM

## 2022-07-25 RX ORDER — SODIUM CHLORIDE 9 MG/ML
INJECTION, SOLUTION INTRAVENOUS PRN
Status: DISCONTINUED | OUTPATIENT
Start: 2022-07-25 | End: 2022-07-25 | Stop reason: HOSPADM

## 2022-07-25 RX ORDER — LIDOCAINE HYDROCHLORIDE 10 MG/ML
INJECTION, SOLUTION EPIDURAL; INFILTRATION; INTRACAUDAL; PERINEURAL PRN
Status: DISCONTINUED | OUTPATIENT
Start: 2022-07-25 | End: 2022-07-25 | Stop reason: ALTCHOICE

## 2022-07-25 RX ORDER — FENTANYL CITRATE 50 UG/ML
INJECTION, SOLUTION INTRAMUSCULAR; INTRAVENOUS PRN
Status: DISCONTINUED | OUTPATIENT
Start: 2022-07-25 | End: 2022-07-25 | Stop reason: SDUPTHER

## 2022-07-25 RX ORDER — PHENYLEPHRINE HCL 2.5 %
1 DROPS OPHTHALMIC (EYE) SEE ADMIN INSTRUCTIONS
Status: DISCONTINUED | OUTPATIENT
Start: 2022-07-25 | End: 2022-07-25 | Stop reason: HOSPADM

## 2022-07-25 RX ORDER — TETRACAINE HYDROCHLORIDE 5 MG/ML
SOLUTION OPHTHALMIC PRN
Status: DISCONTINUED | OUTPATIENT
Start: 2022-07-25 | End: 2022-07-25 | Stop reason: ALTCHOICE

## 2022-07-25 RX ORDER — SODIUM CHLORIDE, SODIUM LACTATE, POTASSIUM CHLORIDE, CALCIUM CHLORIDE 600; 310; 30; 20 MG/100ML; MG/100ML; MG/100ML; MG/100ML
INJECTION, SOLUTION INTRAVENOUS CONTINUOUS
Status: DISCONTINUED | OUTPATIENT
Start: 2022-07-25 | End: 2022-07-25 | Stop reason: HOSPADM

## 2022-07-25 RX ORDER — SODIUM CHLORIDE 0.9 % (FLUSH) 0.9 %
5-40 SYRINGE (ML) INJECTION EVERY 12 HOURS SCHEDULED
Status: DISCONTINUED | OUTPATIENT
Start: 2022-07-25 | End: 2022-07-25 | Stop reason: HOSPADM

## 2022-07-25 RX ORDER — TROPICAMIDE 10 MG/ML
1 SOLUTION/ DROPS OPHTHALMIC SEE ADMIN INSTRUCTIONS
Status: DISCONTINUED | OUTPATIENT
Start: 2022-07-25 | End: 2022-07-25 | Stop reason: HOSPADM

## 2022-07-25 RX ORDER — SODIUM CHLORIDE 9 MG/ML
INJECTION, SOLUTION INTRAVENOUS CONTINUOUS
Status: DISCONTINUED | OUTPATIENT
Start: 2022-07-25 | End: 2022-07-25 | Stop reason: HOSPADM

## 2022-07-25 RX ORDER — PROPARACAINE HYDROCHLORIDE 5 MG/ML
1 SOLUTION/ DROPS OPHTHALMIC SEE ADMIN INSTRUCTIONS
Status: DISCONTINUED | OUTPATIENT
Start: 2022-07-25 | End: 2022-07-25 | Stop reason: HOSPADM

## 2022-07-25 RX ORDER — MIDAZOLAM HYDROCHLORIDE 1 MG/ML
INJECTION INTRAMUSCULAR; INTRAVENOUS PRN
Status: DISCONTINUED | OUTPATIENT
Start: 2022-07-25 | End: 2022-07-25 | Stop reason: SDUPTHER

## 2022-07-25 RX ADMIN — PHENYLEPHRINE HYDROCHLORIDE 1 DROP: 25 SOLUTION/ DROPS OPHTHALMIC at 10:51

## 2022-07-25 RX ADMIN — PROPARACAINE HYDROCHLORIDE 1 DROP: 5 SOLUTION/ DROPS OPHTHALMIC at 10:51

## 2022-07-25 RX ADMIN — PHENYLEPHRINE HYDROCHLORIDE 1 DROP: 25 SOLUTION/ DROPS OPHTHALMIC at 11:00

## 2022-07-25 RX ADMIN — TROPICAMIDE 1 DROP: 10 SOLUTION/ DROPS OPHTHALMIC at 11:00

## 2022-07-25 RX ADMIN — PHENYLEPHRINE HYDROCHLORIDE 1 DROP: 25 SOLUTION/ DROPS OPHTHALMIC at 11:14

## 2022-07-25 RX ADMIN — TETRACAINE HYDROCHLORIDE 1 DROP: 5 SOLUTION OPHTHALMIC at 11:23

## 2022-07-25 RX ADMIN — PROPARACAINE HYDROCHLORIDE 1 DROP: 5 SOLUTION/ DROPS OPHTHALMIC at 11:00

## 2022-07-25 RX ADMIN — MIDAZOLAM 2 MG: 1 INJECTION INTRAMUSCULAR; INTRAVENOUS at 11:27

## 2022-07-25 RX ADMIN — TROPICAMIDE 1 DROP: 10 SOLUTION/ DROPS OPHTHALMIC at 10:51

## 2022-07-25 RX ADMIN — PHENYLEPHRINE HYDROCHLORIDE 200 MCG: 10 INJECTION INTRAVENOUS at 11:43

## 2022-07-25 RX ADMIN — SODIUM CHLORIDE, POTASSIUM CHLORIDE, SODIUM LACTATE AND CALCIUM CHLORIDE: 600; 310; 30; 20 INJECTION, SOLUTION INTRAVENOUS at 11:02

## 2022-07-25 RX ADMIN — PHENYLEPHRINE HYDROCHLORIDE 100 MCG: 10 INJECTION INTRAVENOUS at 11:36

## 2022-07-25 RX ADMIN — TROPICAMIDE 1 DROP: 10 SOLUTION/ DROPS OPHTHALMIC at 11:14

## 2022-07-25 RX ADMIN — FENTANYL CITRATE 50 MCG: 50 INJECTION INTRAMUSCULAR; INTRAVENOUS at 11:27

## 2022-07-25 ASSESSMENT — PAIN - FUNCTIONAL ASSESSMENT: PAIN_FUNCTIONAL_ASSESSMENT: NONE - DENIES PAIN

## 2022-07-25 ASSESSMENT — LIFESTYLE VARIABLES: SMOKING_STATUS: 1

## 2022-07-25 NOTE — PROGRESS NOTES
Patient states ready to be discharged at this time, instructions given to pt and spouse. Both verbalize understanding,deny any questions and/or concerns. Patient denies need for wheelchair. Patient ambulates off unit w/ steady gait and all belongings. Discharge Criteria    Inpatients must meet Criteria 1 through 7. All other patients are either YES or N/A. If a NO is chosen then Anesthesia or Surgeon must be notified. 1.  Minimum 30 minutes after last dose of sedative medication, minimum 120 minutes after last dose of reversal agent. Yes      2. Systolic BP stable within 20 mmHg for 30 minutes & systolic BP between 90 & 453 or within 10 mmHg of baseline. Yes      3. Pulse between 60 and 100 or within 10 bpm of baseline. Yes      4. Spontaneous respiratory rate >/= 10 per minute. Yes      5. SaO2 >/= 95 or  >/= baseline. Yes      6. Able to cough and swallow or return to baseline function. Yes      7. Alert and oriented or return to baseline mental status. Yes      8. Demonstrates controlled, coordinated movements, ambulates with steady gait, or return to baseline activity function. Yes      9. Minimal or no pain or nausea, or at a level tolerable and acceptable to patient. Yes      10. Takes and retains oral fluids as allowed. Yes      11. Procedural / perioperative site stable. Minimal or no bleeding. Yes          12. If GI endoscopy procedure, minimal or no abdominal distention or passing flatus. N/A      13. Written discharge instructions and emergency telephone number provided. Yes      14. Accompanied by a responsible adult.     Yes

## 2022-07-25 NOTE — H&P
Edilberto Brown is a 41y.o. year old who presents for elective outpatient ophthalmic surgery with Kanu Priest DO. The patient complains of decreased vision, glare and halos around lights, and trouble with vision for activities of daily living. Past Medical History:   Diagnosis Date    Anxiety 2007    Depression 1993    GERD (gastroesophageal reflux disease) 2010    H/O cardiovascular stress test 07/02/2018    Largerly normal myocardial perfusion imaging with soft tissue artifact but without evidence of signinfcant myocardial ischemia or infarction. EF 75%. The pt duke treadmill score is 6. H/O echocardiogram 07/02/2018    EF >70%. The LV wallt thickness is mildly incrased. H/O tilt table evaluation 05/24/2018    Borderline abnormal head upright tilt table study. Although the pt heart rate, blood pressure and symptoms were suggestive of orthostatic intolerance. Therefore, if clinically suspicion remains high, a trial of empiric trreatment and/or re-testing may be indicated. Hyperlipidemia 2010    Hypertension 2009    Kidney dysfunction     pt only has one kidney    Obesity 1999    Type 2 diabetes mellitus without complication ([facility] [address] 4480       Past Surgical History:   Procedure Laterality Date    BLADDER SUSPENSION      CARPAL TUNNEL RELEASE      CHOLECYSTECTOMY      DILATION AND CURETTAGE OF UTERUS  2007    KIDNEY REMOVAL      left       Home meds:   Prior to Admission medications    Medication Sig Start Date End Date Taking?  Authorizing Provider   omeprazole (PRILOSEC) 40 MG delayed release capsule Take 1 capsule by mouth every morning (before breakfast) 5/25/22   RONN Qureshi CNP   SITagliptin (JANUVIA) 100 MG tablet TAKE 1 TABLET BY MOUTH EVERY DAY 5/25/22   RONN Qureshi CNP   montelukast (SINGULAIR) 10 MG tablet TAKE 1 TABLET BY MOUTH EVERY DAY 5/25/22   RONN Qureshi CNP   metoprolol succinate (TOPROL XL) 25 MG extended release tablet TAKE 1/2 TABLET BY MOUTH ONCE DAILY 5/25/22   RONN Byrd CNP   metFORMIN (GLUCOPHAGE) 1000 MG tablet TAKE 1 TABLET BY MOUTH TWICE A DAY WITH MEALS 5/25/22   RONN Byrd CNP   lisinopril (PRINIVIL;ZESTRIL) 2.5 MG tablet TAKE 1 TABLET BY MOUTH EVERY DAY 5/25/22   RONN Byrd CNP   aspirin ([employer]S ASPIRIN ADULT LOW STRENGTH) 81 MG EC tablet TAKE 1 TABLET BY MOUTH EVERY DAY 5/25/22   RONN Byrd CNP   atorvastatin (LIPITOR) 40 MG tablet Take 1 tablet by mouth daily 5/25/22   RONN Byrd CNP   dapagliflozin (FARXIGA) 5 MG tablet Take 1 tablet by mouth every morning 8/12/21   RONN Mendez CNP   dapagliflozin (FARXIGA) 10 MG tablet Take 1 tablet by mouth every morning 9/12/21   RONN Hawkins CNP   glimepiride (AMARYL) 4 MG tablet Take 1 tablet by mouth every morning (before breakfast) 8/12/21   RONN Hawkins CNP   fluticasone (FLONASE) 50 MCG/ACT nasal spray 1 spray by Each Nostril route daily  Patient not taking: Reported on 7/20/2022 10/14/20   RONN Hawkins CNP   albuterol sulfate  (90 Base) MCG/ACT inhaler Inhale 2 puffs into the lungs 4 times daily as needed for Wheezing or Shortness of Breath 12/31/19   Teola Pump Might, APRN - CNP   ibuprofen (ADVIL;MOTRIN) 200 MG tablet Take 800 mg by mouth every 6 hours as needed for Pain    Historical Provider, MD   CRANBERRY-VITAMIN C PO Take by mouth daily     Historical Provider, MD     Scheduled Meds:  Continuous Infusions:  PRN Meds:. Allergies   Allergen Reactions    Pcn [Penicillins] Rash       There were no vitals filed for this visit. PHYSICAL EXAMINATION    Gen: NAD  HEENT: BCVA= 20/30, Glare testing= 20/400, Cataract severity/type-2+ Posterior Subcapsular Age-Related Cataract-left eye, Other significant ocular findings= none  Pulm: CTA   Heart: RRR, no C/M/R/G  Abd: S/NT/ND  Neuro: no focal defecits    Assessment:   1.   Posterior Subcapsular Age-Related Cataract-left eye  2. ASA Score-2      Plan:   1. Risks, benefits, alternatives to surgery discussed with the patient and family. 2. All questions were answered to their satisfaction. 3. Ok to proceed with surgery as planned.     Delroy Reyes, , DO

## 2022-07-25 NOTE — ANESTHESIA POSTPROCEDURE EVALUATION
Department of Anesthesiology  Postprocedure Note    Patient: Mele Mena  MRN: [id number]  YOB: 1975  Date of evaluation: 7/25/2022      Procedure Summary     Date: 07/25/22 Room / Location: [place] [address] / [facility]    Anesthesia Start: 1125 Anesthesia Stop: 8697    Procedure: EYE CATARACT EMULSIFICATION IOL IMPLANT (Left) Diagnosis:       Posterior subcapsular polar age-related cataract of both eyes      (POSTERIOR SUBCAPSULAR POLAR AGE RELATED CAT 2+NS, 2+PSC,)    Surgeons: Kwan Connolly DO Responsible Provider: RONN Soliz CRNA    Anesthesia Type: MAC ASA Status: 3          Anesthesia Type: No value filed.     Tino Phase I:      Tino Phase II:        Anesthesia Post Evaluation    Patient location during evaluation: PACU  Patient participation: complete - patient participated  Level of consciousness: awake and alert  Airway patency: patent  Nausea & Vomiting: no nausea and no vomiting  Complications: no  Cardiovascular status: blood pressure returned to baseline and hemodynamically stable  Respiratory status: acceptable and room air  Hydration status: euvolemic

## 2022-07-25 NOTE — DISCHARGE INSTRUCTIONS
may resume your normal diet.     Start your eye drops tomorrow after your post-op appointment:    Ofloxacin/Polytrim one drop to the operated eye 4 times daily    Prednisolone one drop to the operated eye 4 times daily

## 2022-07-25 NOTE — TELEPHONE ENCOUNTER
Health Maintenance   Topic Date Due    DTaP/Tdap/Td vaccine (1 - Tdap) Never done    Cervical cancer screen  Never done    Diabetic microalbuminuria test  09/28/2020    Lipids  09/28/2020    Diabetic retinal exam  08/17/2021    Colorectal Cancer Screen  07/09/2022    Hepatitis B vaccine (1 of 3 - Risk 3-dose series) 05/25/2023 (Originally 7/9/1996)    Pneumococcal 0-64 years Vaccine (2 - PCV) 05/25/2023 (Originally 2/15/2019)    Hepatitis C screen  05/25/2023 (Originally 7/9/1995)    HIV screen  05/25/2023 (Originally 7/9/1992)    Diabetic foot exam  08/12/2022    Flu vaccine (1) 09/01/2022    A1C test (Diabetic or Prediabetic)  05/25/2023    Depression Monitoring  05/25/2023    COVID-19 Vaccine  Completed    Hepatitis A vaccine  Aged Out    Hib vaccine  Aged Out    Meningococcal (ACWY) vaccine  Aged Out             (applicable per patient's age: Cancer Screenings, Depression Screening, Fall Risk Screening, Immunizations)    Hemoglobin A1C (%)   Date Value   05/25/2022 8.0   08/12/2021 7.8   05/14/2021 8.2 (H)     Microalb/Crt.  Ratio (mcg/mg creat)   Date Value   09/28/2019 CANNOT BE CALCULATED     LDL Cholesterol (mg/dL)   Date Value   09/28/2019 85     AST (U/L)   Date Value   05/14/2021 17     ALT (U/L)   Date Value   05/14/2021 22     BUN (mg/dL)   Date Value   05/14/2021 11      (goal A1C is < 7)   (goal LDL is <100) need 30-50% reduction from baseline     BP Readings from Last 3 Encounters:   05/25/22 124/82   08/12/21 118/71   05/19/21 130/60    (goal /80)      All Future Testing planned in [facility]:  Lab Frequency Next Occurrence       Next Visit Date:  Future Appointments   Date Time Provider Daryl Guillaume   8/25/2022  3:00 PM Krystal Lindsay, APRN - CNP [facility]            Patient Active Problem List:     Type 2 diabetes mellitus without complication, without long-term current use of insulin ([address]     Tobacco abuse     Single kidney     Hyperlipidemia     Syncope     PCOS (polycystic ovarian syndrome)     Gastroesophageal reflux disease     Class 2 obesity with body mass index (BMI) of 37.0 to 37.9 in adult     Posterior subcapsular age-related cataract of left eye

## 2022-07-25 NOTE — OP NOTE
OPERATIVE NOTE      Patient:  Garcia Yi    YOB: 1975    Account #:  [de-identified]    Date:  7/25/2022    Surgeon:  Geeta Park. Freda Mancera DO    Primary Care Physician:RONN Parr - NILESH      Preoperative Diagnosis: Posterior Subcapsular Age-Related Cataract- left eye    Postoperative Diagnosis: Same    Procedure: Phacoemulsification with intraocular lens implantation, left eye    Anesthesia: Topical and intracameral anesthesia with intravenous sedation    Complications: none    Specimens: none    Indications for procedure: The patient is a 41y.o. year old with decreased vision, glare and halos around lights, and trouble with activities of daily living. Examination revealed a visually significant cataract in the left eye. Other eye diseases have been ruled out as the primary cause of decreased visual function. Significant improvement is expected in the patient's visual acuity and/or visual function from the removal of the cataract. Risks, benefits, and alternatives to surgery were discussed with the patient and the patient elected to proceed with phacoemulsification with lens implantation. Details of the procedure: Following informed consent, the patient was identified by name and identification tag in the holding area,taken to the operating room and placed in the supine position. A time out was performed by all present in the room to identify the patient, the eye to be operated on, the correct operative procedure, and the correct intraocular lens. Monitoring leads were placed. The patient was positioned. An eyelid prep of half-strength Betadine was performed. The patient was administered intravenous sedation if desired and topical anesthetic was placed in the operative eye. The eye prepped a second time and draped in the usual sterile fashion using aseptic technique for cataract surgery. A lid speculum was then inserted. [place]oat was placed onto the corneal surface.   A stab incision was made using a disposable blade into the anterior chamber. Intracameral preservative free xylocaine was placed into the anterior chamber. Amvisc was then used to replace the aqueous of the anterior chamber. A 2.2 mm keratome blade was used to make a 3-plane clear corneal incision into cornea depending on the patient's astigmatism. The cystitome was used to make a tear in the anterior capsule. Fine forceps were used to make a complete curvilinear capsulorrhexis. The lens was hydrodissected and freely rotated using a balanced salt filled syringe. The ultrasound handpiece was then used to emulsify the nucleus and epi nucleus. The residual cortex was then aspirated using the IA handpiece. The posterior capsule was polished. The eye was filled with viscoelastic and a foldable posterior chamber intraocular lens was injected into the capsular bag unless otherwise stated in the addendum. Irrigation/aspiration was used to remove all excess viscoelastic. The eye was pressurized and the wounds were check for leaks and none were found. A collagen shield, which had been soaked in antibiotic drops, was then placed onto the cornea. The lid speculum was removed. The eye was covered with a clear plastic shield. The patient was taken to the recovery area in excellent condition, having tolerated the procedure well, and will follow up with me tomorrow for postop care. ADDENDUM:  The phacoemulsification time 21.16 seconds @ 15% average ultrasound power for an effective phacoemulsification time of 3.27 seconds. The lens inserted was a model MX60 made by [employer] that was +21.5 diopters in power. The lens was inserted into the capsular bag utilizing the BLIS-X1 injector made by [employer].  The serial number on this lens was [id number]. There was no stitch placed to close this temporal posterior corneal incision. EBL was less than 1.0 ml. Tigist Jauregui.  Colton Pierce DO

## 2022-07-25 NOTE — H&P
I have examined the patient and reviewed the history and physical completed on 7/24/22 and find no relevant changes. I have reviewed with the patient and/or family the risks, benefits, and alternatives to the procedure and they have agreed to proceed.     Damion Darden, DO  7/25/2022  11:17 AM

## 2022-07-25 NOTE — ANESTHESIA PRE PROCEDURE
Department of Anesthesiology  Preprocedure Note       Name:  Cherise Estes   Age:  41 y.o.  :  1975                                          MRN:  [id number]         Date:  2022      Surgeon: Noemi Schultz):  Artur Cunha DO    Procedure: Procedure(s):  EYE CATARACT EMULSIFICATION IOL IMPLANT    Medications prior to admission:   Prior to Admission medications    Medication Sig Start Date End Date Taking?  Authorizing Provider   albuterol sulfate HFA (PROVENTIL;VENTOLIN;PROAIR) 108 (90 Base) MCG/ACT inhaler Inhale 2 puffs into the lungs 4 times daily as needed for Wheezing or Shortness of Breath 22   Regina Lindsay, APRN - CNP   omeprazole (PRILOSEC) 40 MG delayed release capsule Take 1 capsule by mouth every morning (before breakfast) 22, APRN - CNP   SITagliptin (JANUVIA) 100 MG tablet TAKE 1 TABLET BY MOUTH EVERY DAY 22, APRN - CNP   montelukast (SINGULAIR) 10 MG tablet TAKE 1 TABLET BY MOUTH EVERY DAY 22, APRN - CNP   metoprolol succinate (TOPROL XL) 25 MG extended release tablet TAKE 1/2 TABLET BY MOUTH ONCE DAILY 22, APRN - CNP   metFORMIN (GLUCOPHAGE) 1000 MG tablet TAKE 1 TABLET BY MOUTH TWICE A DAY WITH MEALS 22, APRN - CNP   lisinopril (PRINIVIL;ZESTRIL) 2.5 MG tablet TAKE 1 TABLET BY MOUTH EVERY DAY 22, APRN - CNP   aspirin ([employer]S ASPIRIN ADULT LOW STRENGTH) 81 MG EC tablet TAKE 1 TABLET BY MOUTH EVERY DAY 22, APRN - CNP   atorvastatin (LIPITOR) 40 MG tablet Take 1 tablet by mouth daily 22, APRN - CNP   dapagliflozin (FARXIGA) 5 MG tablet Take 1 tablet by mouth every morning 21   Jh Gamez, APRN - CNP   dapagliflozin (FARXIGA) 10 MG tablet Take 1 tablet by mouth every morning 21   Kye Gamez, APRN - CNP   glimepiride (AMARYL) 4 MG tablet Take 1 tablet by mouth every morning (before breakfast) 8/12/21   RONN Hawkins CNP   fluticasone (FLONASE) 50 MCG/ACT nasal spray 1 spray by Each Nostril route daily  Patient not taking: Reported on 7/20/2022 10/14/20   RONN Hawkins CNP   ibuprofen (ADVIL;MOTRIN) 200 MG tablet Take 800 mg by mouth every 6 hours as needed for Pain    Historical Provider, MD   CRANBERRY-VITAMIN C PO Take by mouth daily     Historical Provider, MD       Current medications:    Current Facility-Administered Medications   Medication Dose Route Frequency Provider Last Rate Last Admin    lactated ringers infusion   IntraVENous Continuous Daphine Loop, APRN - CRNA 100 mL/hr at 07/25/22 1102 New Bag at 07/25/22 1102    0.9 % sodium chloride infusion   IntraVENous Continuous Aldona Rued, DO        sodium chloride flush 0.9 % injection 5-40 mL  5-40 mL IntraVENous 2 times per day Aldona Rued, DO        sodium chloride flush 0.9 % injection 5-40 mL  5-40 mL IntraVENous PRN Aldona Rued, DO        0.9 % sodium chloride infusion   IntraVENous PRN Aldona Rued, DO        proparacaine (ALCAINE) 0.5 % ophthalmic solution 1 drop  1 drop Left Eye See Admin Instructions Aldona Rued, DO   1 drop at 07/25/22 1100    tetracaine (TETRAVISC) 0.5 % ophthalmic solution 1 drop  1 drop Left Eye See Admin Instructions Aldona Rued, DO        tropicamide (MYDRIACYL) 1 % ophthalmic solution 1 drop  1 drop Left Eye See Admin Instructions Aldona Rued, DO   1 drop at 07/25/22 1100    phenylephrine (MYDFRIN) 2.5 % ophthalmic solution 1 drop  1 drop Left Eye See Admin Instructions Aldona Rued, DO   1 drop at 07/25/22 1100       Allergies:     Allergies   Allergen Reactions    Pcn [Penicillins] Rash       Problem List:    Patient Active Problem List   Diagnosis Code    Type 2 diabetes mellitus without complication, without long-term current use of insulin (HCC) E11.9    Tobacco abuse Z72.0    Single kidney Z90.5    Hyperlipidemia E78.5    Syncope R55    PCOS (polycystic ovarian syndrome) E28.2    Gastroesophageal reflux disease K21.9    Class 2 obesity with body mass index (BMI) of 37.0 to 37.9 in adult E66.9, Z68.37    Posterior subcapsular age-related cataract of left eye H25.042       Past Medical History:        Diagnosis Date    Anxiety 2007    Depression 1993    GERD (gastroesophageal reflux disease) 2010    H/O cardiovascular stress test 07/02/2018    Largerly normal myocardial perfusion imaging with soft tissue artifact but without evidence of signinfcant myocardial ischemia or infarction. EF 75%. The pt duke treadmill score is 6.    H/O echocardiogram 07/02/2018    EF >70%. The LV wallt thickness is mildly incrased.  H/O tilt table evaluation 05/24/2018    Borderline abnormal head upright tilt table study. Although the pt heart rate, blood pressure and symptoms were suggestive of orthostatic intolerance. Therefore, if clinically suspicion remains high, a trial of empiric trreatment and/or re-testing may be indicated.      Hyperlipidemia 2010    Hypertension 2009    Kidney dysfunction     pt only has one kidney    Obesity 1999    Type 2 diabetes mellitus without complication ([facility] [address] 3711       Past Surgical History:        Procedure Laterality Date    BLADDER SUSPENSION      CARPAL TUNNEL RELEASE      CHOLECYSTECTOMY      DILATION AND CURETTAGE OF UTERUS  2007    KIDNEY REMOVAL      left       Social History:    Social History     Tobacco Use    Smoking status: Every Day     Packs/day: 1.00     Years: 25.00     Pack years: 25.00     Types: Cigarettes    Smokeless tobacco: Never   Substance Use Topics    Alcohol use: No     Alcohol/week: 6.0 standard drinks     Types: 3 Glasses of wine, 1 Cans of beer, 2 Shots of liquor per week     Comment: biweekly or monthly                                Ready to quit: Not Answered  Counseling given: Not Answered      Vital Signs (Current):   Vitals:    07/20/22 1016 07/25/22 1054   BP:  100/66   Pulse: 80   Resp:  16   Temp:  36.4 °C (97.5 °F)   TempSrc:  Temporal   SpO2:  95%   Weight: 200 lb (90.7 kg) 203 lb (92.1 kg)   Height: 5' 2\" (1.575 m) 5' 2\" (1.575 m)                                              BP Readings from Last 3 Encounters:   07/25/22 100/66   05/25/22 124/82   08/12/21 118/71       NPO Status: Time of last liquid consumption: 2330                        Time of last solid consumption: 2200                        Date of last liquid consumption: 07/24/22                        Date of last solid food consumption: 07/24/22    BMI:   Wt Readings from Last 3 Encounters:   07/25/22 203 lb (92.1 kg)   05/25/22 204 lb (92.5 kg)   08/12/21 210 lb 9.6 oz (95.5 kg)     Body mass index is 37.13 kg/m². CBC:   Lab Results   Component Value Date/Time    WBC 11.2 05/14/2021 07:41 AM    RBC 4.54 05/14/2021 07:41 AM    HGB 14.9 05/14/2021 07:41 AM    HCT 44.3 05/14/2021 07:41 AM    MCV 97.6 05/14/2021 07:41 AM    RDW 12.2 05/14/2021 07:41 AM     05/14/2021 07:41 AM       CMP:   Lab Results   Component Value Date/Time     05/14/2021 07:41 AM    K 4.2 05/14/2021 07:41 AM     05/14/2021 07:41 AM    CO2 21 05/14/2021 07:41 AM    BUN 11 05/14/2021 07:41 AM    CREATININE 0.65 05/14/2021 07:41 AM    GFRAA >60 05/14/2021 07:41 AM    LABGLOM >60 05/14/2021 07:41 AM    GLUCOSE 103 05/14/2021 07:41 AM    PROT 7.0 02/24/2020 09:30 AM    CALCIUM 9.8 05/14/2021 07:41 AM    BILITOT 0.25 02/24/2020 09:30 AM    ALKPHOS 77 02/24/2020 09:30 AM    AST 17 05/14/2021 07:41 AM    ALT 22 05/14/2021 07:41 AM       POC Tests:   Recent Labs     07/25/22  1102   POCGLU 199*       Coags: No results found for: PROTIME, INR, APTT    HCG (If Applicable):   Lab Results   Component Value Date    PREGTESTUR NEGATIVE 02/21/2020        ABGs: No results found for: PHART, PO2ART, MPB0QCN, VLD5IYA, BEART, [id number]     Type & Screen (If Applicable):  No results found for: LABABO, LABRH    Drug/Infectious Status (If Applicable):   No results found for: HIV, HEPCAB    COVID-19 Screening (If Applicable): No results found for: COVID19        Anesthesia Evaluation  Patient summary reviewed and Nursing notes reviewed no history of anesthetic complications:   Airway: Mallampati: II  TM distance: >3 FB   Neck ROM: full  Mouth opening: > = 3 FB   Dental: normal exam         Pulmonary:normal exam    (+) current smoker          Patient smoked on day of surgery. Cardiovascular:    (+) hypertension:, hyperlipidemia      ECG reviewed      Echocardiogram reviewed               ROS comment: Summary  Global left ventricular systolic function appears hyperdynamic with an  estimated ejection fraction of >70%. The left ventricular cavity size is within normal limits and the left  ventricular wall thickness is mildly increased. No definite specific wall motion abnormalities were identified. No significant valvular abnormalities. No clear evidence of diastolic dysfunction was seen. Neuro/Psych:   (+) depression/anxiety             GI/Hepatic/Renal:   (+) GERD: well controlled, morbid obesity          Endo/Other:    (+) Diabetes, . Abdominal:   (+) obese,           Vascular: negative vascular ROS. Other Findings:           Anesthesia Plan      MAC     ASA 3             Anesthetic plan and risks discussed with patient. Plan discussed with CRNA.                     RONN Forrest - BRIAN   7/25/2022

## 2022-08-03 ENCOUNTER — TELEPHONE (OUTPATIENT)
Dept: PRIMARY CARE CLINIC | Age: 47
End: 2022-08-03

## 2022-08-03 DIAGNOSIS — E11.9 TYPE 2 DIABETES MELLITUS WITHOUT COMPLICATION, WITHOUT LONG-TERM CURRENT USE OF INSULIN (HCC): ICD-10-CM

## 2022-08-03 NOTE — TELEPHONE ENCOUNTER
Ellen Black has not been taking [place] since February. She did not realize she didn't have it. Can she just start taking the 10 mg dosage or does she need to start with 5 mg again? Please send refill of dosage she should be taking.

## 2022-08-25 ENCOUNTER — OFFICE VISIT (OUTPATIENT)
Dept: PRIMARY CARE CLINIC | Age: 47
End: 2022-08-25
Payer: COMMERCIAL

## 2022-08-25 VITALS
OXYGEN SATURATION: 99 % | DIASTOLIC BLOOD PRESSURE: 78 MMHG | BODY MASS INDEX: 37.09 KG/M2 | WEIGHT: 202.8 LBS | SYSTOLIC BLOOD PRESSURE: 130 MMHG | HEART RATE: 70 BPM | RESPIRATION RATE: 20 BRPM | TEMPERATURE: 97.7 F

## 2022-08-25 DIAGNOSIS — E11.9 TYPE 2 DIABETES MELLITUS WITHOUT COMPLICATION, WITHOUT LONG-TERM CURRENT USE OF INSULIN (HCC): Primary | ICD-10-CM

## 2022-08-25 DIAGNOSIS — K21.9 GASTROESOPHAGEAL REFLUX DISEASE WITHOUT ESOPHAGITIS: ICD-10-CM

## 2022-08-25 DIAGNOSIS — E78.5 DYSLIPIDEMIA: ICD-10-CM

## 2022-08-25 LAB — HBA1C MFR BLD: 9.2 %

## 2022-08-25 PROCEDURE — 83036 HEMOGLOBIN GLYCOSYLATED A1C: CPT | Performed by: NURSE PRACTITIONER

## 2022-08-25 PROCEDURE — 99214 OFFICE O/P EST MOD 30 MIN: CPT | Performed by: NURSE PRACTITIONER

## 2022-08-25 PROCEDURE — 3046F HEMOGLOBIN A1C LEVEL >9.0%: CPT | Performed by: NURSE PRACTITIONER

## 2022-08-25 RX ORDER — GLIMEPIRIDE 4 MG/1
4 TABLET ORAL
Qty: 90 TABLET | Refills: 3 | Status: SHIPPED | OUTPATIENT
Start: 2022-08-25

## 2022-08-25 ASSESSMENT — ENCOUNTER SYMPTOMS
RHINORRHEA: 0
SORE THROAT: 0
CONSTIPATION: 0
VOMITING: 0
VISUAL CHANGE: 0
COUGH: 0
SHORTNESS OF BREATH: 0
ABDOMINAL PAIN: 0
HEARTBURN: 1
DIARRHEA: 0
BELCHING: 0
WHEEZING: 0
NAUSEA: 0
WATER BRASH: 0
GLOBUS SENSATION: 0

## 2022-08-25 ASSESSMENT — PATIENT HEALTH QUESTIONNAIRE - PHQ9
SUM OF ALL RESPONSES TO PHQ QUESTIONS 1-9: 0
SUM OF ALL RESPONSES TO PHQ QUESTIONS 1-9: 0
2. FEELING DOWN, DEPRESSED OR HOPELESS: 0
1. LITTLE INTEREST OR PLEASURE IN DOING THINGS: 0
SUM OF ALL RESPONSES TO PHQ9 QUESTIONS 1 & 2: 0
SUM OF ALL RESPONSES TO PHQ QUESTIONS 1-9: 0
SUM OF ALL RESPONSES TO PHQ QUESTIONS 1-9: 0

## 2022-08-25 NOTE — PROGRESS NOTES
inhibitor/angiotensin II receptor blocker is being taken. She does not see a podiatrist.Eye exam is current. Hyperlipidemia  This is a chronic problem. The current episode started more than 1 year ago. The problem is controlled. Recent lipid tests were reviewed and are normal. Exacerbating diseases include diabetes and obesity. She has no history of hypothyroidism. Factors aggravating her hyperlipidemia include fatty foods. Pertinent negatives include no chest pain, leg pain, myalgias or shortness of breath. Current antihyperlipidemic treatment includes statins. The current treatment provides moderate improvement of lipids. Compliance problems include adherence to diet. Risk factors for coronary artery disease include diabetes mellitus, dyslipidemia, family history, obesity, stress and a sedentary lifestyle. Gastroesophageal Reflux  She complains of heartburn. She reports no abdominal pain, no belching, no chest pain, no coughing, no early satiety, no globus sensation, no nausea, no sore throat, no water brash or no wheezing. This is a chronic problem. The current episode started more than 1 year ago. The problem occurs occasionally. The problem has been unchanged. The heartburn duration is less than a minute. The heartburn is located in the substernum. The heartburn is of mild intensity. The heartburn does not wake her from sleep. The heartburn does not limit her activity. The heartburn doesn't change with position. The symptoms are aggravated by caffeine, stress and certain foods. Pertinent negatives include no fatigue, melena or weight loss. Risk factors include obesity, lack of exercise and caffeine use. She has tried a PPI for the symptoms. The treatment provided significant relief. Past procedures do not include an EGD or a UGI. Past invasive treatments do not include gastroplasty, gastroplication or reflux surgery.        Past Medical History:     Past Medical History:   Diagnosis Date    Anxiety 2007 Depression 1993    GERD (gastroesophageal reflux disease) 2010    H/O cardiovascular stress test 07/02/2018    Largerly normal myocardial perfusion imaging with soft tissue artifact but without evidence of signinfcant myocardial ischemia or infarction. EF 75%. The pt duke treadmill score is 6. H/O echocardiogram 07/02/2018    EF >70%. The LV wallt thickness is mildly incrased. H/O tilt table evaluation 05/24/2018    Borderline abnormal head upright tilt table study. Although the pt heart rate, blood pressure and symptoms were suggestive of orthostatic intolerance. Therefore, if clinically suspicion remains high, a trial of empiric trreatment and/or re-testing may be indicated. Hyperlipidemia 2010    Hypertension 2009    Kidney dysfunction     pt only has one kidney    Obesity 1999    Type 2 diabetes mellitus without complication ([facility] [address] 0167      Reviewed all health maintenance requirements and ordered appropriate tests  Health Maintenance Due   Topic Date Due    Diabetic microalbuminuria test  09/28/2020    Lipids  09/28/2020    Colorectal Cancer Screen  Never done       Past Surgical History:     Past Surgical History:   Procedure Laterality Date    BLADDER SUSPENSION      CARPAL TUNNEL RELEASE      CHOLECYSTECTOMY      DILATION AND CURETTAGE OF UTERUS  2007    INTRACAPSULAR CATARACT EXTRACTION Left 7/25/2022    EYE CATARACT EMULSIFICATION IOL IMPLANT performed by Carmen Paz DO at [facility]      left        Medications:       Prior to Admission medications    Medication Sig Start Date End Date Taking?  Authorizing Provider   glimepiride (AMARYL) 4 MG tablet Take 1 tablet by mouth every morning (before breakfast) 8/25/22  Yes RONN Tucker CNP   dapagliflozin (FARXIGA) 5 MG tablet Take 1 tablet by mouth every morning 8/3/22  Yes Deborah Baring, APRN - CNP   albuterol sulfate HFA (PROVENTIL;VENTOLIN;PROAIR) 108 (90 Base) MCG/ACT inhaler Inhale 2 puffs into the lungs 4 times Depression Maternal Grandmother     Diabetes Maternal Grandmother     Heart Disease Maternal Grandmother     High Blood Pressure Maternal Grandmother     High Cholesterol Maternal Grandmother     Mental Illness Maternal Grandmother     Obesity Maternal Grandmother     Other Maternal Grandfather        Review of Systems:     Positive and Negative as described in HPI    Review of Systems   Constitutional:  Negative for chills, fatigue, fever and weight loss. HENT:  Negative for congestion, rhinorrhea and sore throat. Eyes:  Negative for visual disturbance. Respiratory:  Negative for cough, shortness of breath and wheezing. Cardiovascular:  Negative for chest pain and palpitations. Gastrointestinal:  Positive for heartburn. Negative for abdominal pain, constipation, diarrhea, melena, nausea and vomiting. Endocrine: Negative for polydipsia, polyphagia and polyuria. Genitourinary:  Negative for difficulty urinating and dysuria. Musculoskeletal:  Negative for gait problem, myalgias, neck pain and neck stiffness. Skin:  Negative for pallor and rash. Neurological:  Negative for dizziness, syncope, speech difficulty, light-headedness and headaches. Psychiatric/Behavioral:  The patient is not nervous/anxious. Physical Exam:   Vitals:  /78 (Position: Sitting)   Pulse 70   Temp 97.7 °F (36.5 °C) (Temporal)   Resp 20   Wt 202 lb 12.8 oz (92 kg)   SpO2 99%   BMI 37.09 kg/m²     Physical Exam  Constitutional:       General: She is not in acute distress. Appearance: Normal appearance. She is obese. She is not ill-appearing. HENT:      Mouth/Throat:      Mouth: Mucous membranes are moist.      Pharynx: Oropharynx is clear. Eyes:      General: No scleral icterus. Conjunctiva/sclera: Conjunctivae normal.   Neck:      Vascular: No carotid bruit. Cardiovascular:      Rate and Rhythm: Normal rate and regular rhythm. Heart sounds: No murmur heard.   Pulmonary:      Effort: Pulmonary effort is normal.      Breath sounds: Normal breath sounds. Abdominal:      General: Bowel sounds are normal. There is no distension. Palpations: Abdomen is soft. Tenderness: There is no abdominal tenderness. Musculoskeletal:      Cervical back: Normal range of motion and neck supple. Right lower leg: No edema. Left lower leg: No edema. Skin:     General: Skin is warm and dry. Findings: No rash. Neurological:      Mental Status: She is alert and oriented to person, place, and time. Psychiatric:         Mood and Affect: Mood normal.         Behavior: Behavior normal.       Data:     Lab Results   Component Value Date/Time     05/14/2021 07:41 AM    K 4.2 05/14/2021 07:41 AM     05/14/2021 07:41 AM    CO2 21 05/14/2021 07:41 AM    BUN 11 05/14/2021 07:41 AM    CREATININE 0.65 05/14/2021 07:41 AM    GLUCOSE 103 05/14/2021 07:41 AM    PROT 7.0 02/24/2020 09:30 AM    LABALBU 3.9 02/24/2020 09:30 AM    BILITOT 0.25 02/24/2020 09:30 AM    ALKPHOS 77 02/24/2020 09:30 AM    AST 17 05/14/2021 07:41 AM    ALT 22 05/14/2021 07:41 AM     Lab Results   Component Value Date/Time    WBC 11.2 05/14/2021 07:41 AM    RBC 4.54 05/14/2021 07:41 AM    HGB 14.9 05/14/2021 07:41 AM    HCT 44.3 05/14/2021 07:41 AM    MCV 97.6 05/14/2021 07:41 AM    MCH 32.8 05/14/2021 07:41 AM    MCHC 33.6 05/14/2021 07:41 AM    RDW 12.2 05/14/2021 07:41 AM     05/14/2021 07:41 AM    MPV 9.8 05/14/2021 07:41 AM     Lab Results   Component Value Date/Time    TSH 4.10 09/26/2019 10:40 PM     Lab Results   Component Value Date/Time    CHOL 182 09/28/2019 07:33 AM    HDL 29 09/28/2019 07:33 AM    LABA1C 9.2 08/25/2022 03:42 PM       Assessment/Plan:      Diagnosis Orders   1.  Type 2 diabetes mellitus without complication, without long-term current use of insulin (HCC)  POCT glycosylated hemoglobin (Hb A1C)    CBC with Auto Differential    ALT    AST    Basic Metabolic Panel    Lipid Panel Hemoglobin A1C    Microalbumin, Ur    glimepiride (AMARYL) 4 MG tablet      2. Dyslipidemia        3. Gastroesophageal reflux disease without esophagitis          Continue all current medications. Restart Larry Walters. Encouraged healthy lifestyle modification. We will see her back in 3 months with labs, sooner if any issues. 1.  Semaj Guevara received counseling on the following healthy behaviors: nutrition, exercise, and medication adherence  2. Patient given educational materials - see patient instructions  3. Was a self-tracking handout given in paper form or via [employer]? No  If yes, see orders or list here. 4.  Discussed use, benefit, and side effects of prescribed medications. Barriers to medication compliance addressed. All patient questions answered. Pt voiced understanding. 5.  Reviewed prior labs and health maintenance  6. Continue current medications, diet and exercise. Completed Refills   Requested Prescriptions     Signed Prescriptions Disp Refills    glimepiride (AMARYL) 4 MG tablet 90 tablet 3     Sig: Take 1 tablet by mouth every morning (before breakfast)         Return in about 3 months (around 11/25/2022) for Check up.

## 2022-08-25 NOTE — PATIENT INSTRUCTIONS
SURVEY:     You may be receiving a survey from [employer] regarding your visit today. Please complete the survey to enable us to provide the highest quality of care to you and your family. If you cannot score us a very good on any question, please call the office to discuss how we could have made your experience a very good one.      Thank you,    Daren Lindsay, APRN-INLESH Mcdonnell, APRN-CNP  Alix Shrestha, ROSALINE Smith, CMA Jennet Hatchet, GABI Mauricio, CMA  Ankita, PCA  Tamiko, PM

## 2022-10-17 NOTE — PROGRESS NOTES
Patient received; from Dr. Winter Larsen office, NPO instructions and pre-op medication instructions to be taken on the day of the procedure with a small sip of water. Pt was also given pre-op eye drop instructions from Dr. Winter Larsen office. Reminded to bring eye bag with drops and eye shield, verbalizes understanding.

## 2022-10-20 DIAGNOSIS — E11.9 TYPE 2 DIABETES MELLITUS WITHOUT COMPLICATION, WITHOUT LONG-TERM CURRENT USE OF INSULIN (HCC): ICD-10-CM

## 2022-10-20 RX ORDER — DAPAGLIFLOZIN 5 MG/1
TABLET, FILM COATED ORAL
Qty: 90 TABLET | Refills: 1 | Status: SHIPPED | OUTPATIENT
Start: 2022-10-20

## 2022-10-20 NOTE — TELEPHONE ENCOUNTER
Health Maintenance   Topic Date Due    Diabetic microalbuminuria test  09/28/2020    Lipids  09/28/2020    Colorectal Cancer Screen  Never done    Flu vaccine (1) 08/01/2022    Hepatitis B vaccine (1 of 3 - Risk 3-dose series) 05/25/2023 (Originally 7/9/1996)    Hepatitis C screen  05/25/2023 (Originally 7/9/1995)    HIV screen  05/25/2023 (Originally 7/9/1992)    Diabetic foot exam  08/25/2023 (Originally 8/12/2022)    DTaP/Tdap/Td vaccine (1 - Tdap) 08/25/2023 (Originally 7/9/1996)    Cervical cancer screen  08/25/2023 (Originally 7/9/1998)    Diabetic retinal exam  09/04/2023 (Originally 8/17/2021)    A1C test (Diabetic or Prediabetic)  11/25/2022    Depression Screen  08/25/2023    Pneumococcal 0-64 years Vaccine  Completed    COVID-19 Vaccine  Completed    Hepatitis A vaccine  Aged Out    Hib vaccine  Aged Out    Meningococcal (ACWY) vaccine  Aged Out             (applicable per patient's age: Cancer Screenings, Depression Screening, Fall Risk Screening, Immunizations)    Hemoglobin A1C (%)   Date Value   08/25/2022 9.2   05/25/2022 8.0   08/12/2021 7.8     Microalb/Crt.  Ratio (mcg/mg creat)   Date Value   09/28/2019 CANNOT BE CALCULATED     LDL Cholesterol (mg/dL)   Date Value   09/28/2019 85     AST (U/L)   Date Value   05/14/2021 17     ALT (U/L)   Date Value   05/14/2021 22     BUN (mg/dL)   Date Value   05/14/2021 11      (goal A1C is < 7)   (goal LDL is <100) need 30-50% reduction from baseline     BP Readings from Last 3 Encounters:   08/25/22 130/78   07/25/22 96/63   05/25/22 124/82    (goal /80)      All Future Testing planned in [facility]:  Lab Frequency Next Occurrence   CBC with Auto Differential Once 11/23/2022   ALT Once 11/25/2022   AST Once 90/65/0271   Basic Metabolic Panel Once 17/89/4577   Lipid Panel Once 11/23/2022   Hemoglobin A1C Once 11/23/2022   Microalbumin, Ur Once 11/23/2022       Next Visit Date:  Future Appointments   Date Time Provider Daryl Guillaume   11/22/2022  3:20 PM Teressa Lindsay, APRN - CNP [facility]            Patient Active Problem List:     Type 2 diabetes mellitus without complication, without long-term current use of insulin (HCC)     Tobacco abuse     Single kidney     Hyperlipidemia     Syncope     PCOS (polycystic ovarian syndrome)     Gastroesophageal reflux disease     Class 2 obesity with body mass index (BMI) of 37.0 to 37.9 in adult

## 2022-10-21 ENCOUNTER — ANESTHESIA EVENT (OUTPATIENT)
Dept: OPERATING ROOM | Age: 47
End: 2022-10-21
Payer: COMMERCIAL

## 2022-10-23 PROBLEM — H25.041 POSTERIOR SUBCAPSULAR AGE-RELATED CATARACT OF RIGHT EYE: Chronic | Status: ACTIVE | Noted: 2022-10-23

## 2022-10-24 ENCOUNTER — HOSPITAL ENCOUNTER (OUTPATIENT)
Age: 47
Setting detail: OUTPATIENT SURGERY
Discharge: HOME | End: 2022-10-24
Attending: OPHTHALMOLOGY | Admitting: OPHTHALMOLOGY
Payer: COMMERCIAL

## 2022-10-24 ENCOUNTER — ANESTHESIA (OUTPATIENT)
Dept: OPERATING ROOM | Age: 47
End: 2022-10-24
Payer: COMMERCIAL

## 2022-10-24 VITALS
WEIGHT: 203.4 LBS | BODY MASS INDEX: 37.2 KG/M2 | SYSTOLIC BLOOD PRESSURE: 90 MMHG | RESPIRATION RATE: 16 BRPM | OXYGEN SATURATION: 97 % | DIASTOLIC BLOOD PRESSURE: 54 MMHG | HEART RATE: 75 BPM | TEMPERATURE: 97.2 F

## 2022-10-24 PROBLEM — H25.041 POSTERIOR SUBCAPSULAR AGE-RELATED CATARACT OF RIGHT EYE: Chronic | Status: RESOLVED | Noted: 2022-10-23 | Resolved: 2022-10-24

## 2022-10-24 PROCEDURE — 7100000011 HC PHASE II RECOVERY - ADDTL 15 MIN: Performed by: OPHTHALMOLOGY

## 2022-10-24 PROCEDURE — 6360000002 HC RX W HCPCS: Performed by: NURSE ANESTHETIST, CERTIFIED REGISTERED

## 2022-10-24 PROCEDURE — 2500000003 HC RX 250 WO HCPCS: Performed by: OPHTHALMOLOGY

## 2022-10-24 PROCEDURE — 3600000003 HC SURGERY LEVEL 3 BASE: Performed by: OPHTHALMOLOGY

## 2022-10-24 PROCEDURE — 6370000000 HC RX 637 (ALT 250 FOR IP): Performed by: OPHTHALMOLOGY

## 2022-10-24 PROCEDURE — 3600000013 HC SURGERY LEVEL 3 ADDTL 15MIN: Performed by: OPHTHALMOLOGY

## 2022-10-24 PROCEDURE — 7100000010 HC PHASE II RECOVERY - FIRST 15 MIN: Performed by: OPHTHALMOLOGY

## 2022-10-24 PROCEDURE — V2632 POST CHMBR INTRAOCULAR LENS: HCPCS | Performed by: OPHTHALMOLOGY

## 2022-10-24 PROCEDURE — 6360000002 HC RX W HCPCS: Performed by: OPHTHALMOLOGY

## 2022-10-24 PROCEDURE — 3700000000 HC ANESTHESIA ATTENDED CARE: Performed by: OPHTHALMOLOGY

## 2022-10-24 PROCEDURE — 2580000003 HC RX 258: Performed by: NURSE ANESTHETIST, CERTIFIED REGISTERED

## 2022-10-24 PROCEDURE — 3700000001 HC ADD 15 MINUTES (ANESTHESIA): Performed by: OPHTHALMOLOGY

## 2022-10-24 PROCEDURE — 2709999900 HC NON-CHARGEABLE SUPPLY: Performed by: OPHTHALMOLOGY

## 2022-10-24 DEVICE — LENS INTRAOCULAR BCNVX 22+ DIOPT 6X12.5 MM ACRYL ENVISTA: Type: IMPLANTABLE DEVICE | Site: EYE | Status: FUNCTIONAL

## 2022-10-24 RX ORDER — SODIUM CHLORIDE 0.9 % (FLUSH) 0.9 %
5-40 SYRINGE (ML) INJECTION EVERY 12 HOURS SCHEDULED
Status: DISCONTINUED | OUTPATIENT
Start: 2022-10-24 | End: 2022-10-24 | Stop reason: HOSPADM

## 2022-10-24 RX ORDER — SODIUM CHLORIDE 0.9 % (FLUSH) 0.9 %
5-40 SYRINGE (ML) INJECTION PRN
Status: DISCONTINUED | OUTPATIENT
Start: 2022-10-24 | End: 2022-10-24 | Stop reason: HOSPADM

## 2022-10-24 RX ORDER — TETRACAINE HYDROCHLORIDE 5 MG/ML
SOLUTION OPHTHALMIC PRN
Status: DISCONTINUED | OUTPATIENT
Start: 2022-10-24 | End: 2022-10-24 | Stop reason: ALTCHOICE

## 2022-10-24 RX ORDER — FENTANYL CITRATE 50 UG/ML
INJECTION, SOLUTION INTRAMUSCULAR; INTRAVENOUS PRN
Status: DISCONTINUED | OUTPATIENT
Start: 2022-10-24 | End: 2022-10-24 | Stop reason: SDUPTHER

## 2022-10-24 RX ORDER — SODIUM CHLORIDE 9 MG/ML
INJECTION, SOLUTION INTRAVENOUS PRN
Status: DISCONTINUED | OUTPATIENT
Start: 2022-10-24 | End: 2022-10-24 | Stop reason: HOSPADM

## 2022-10-24 RX ORDER — TETRACAINE HYDROCHLORIDE 5 MG/ML
1 SOLUTION OPHTHALMIC SEE ADMIN INSTRUCTIONS
Status: DISCONTINUED | OUTPATIENT
Start: 2022-10-24 | End: 2022-10-24 | Stop reason: HOSPADM

## 2022-10-24 RX ORDER — MIDAZOLAM HYDROCHLORIDE 1 MG/ML
INJECTION INTRAMUSCULAR; INTRAVENOUS PRN
Status: DISCONTINUED | OUTPATIENT
Start: 2022-10-24 | End: 2022-10-24 | Stop reason: SDUPTHER

## 2022-10-24 RX ORDER — SODIUM CHLORIDE 9 MG/ML
INJECTION, SOLUTION INTRAVENOUS CONTINUOUS
Status: DISCONTINUED | OUTPATIENT
Start: 2022-10-24 | End: 2022-10-24 | Stop reason: HOSPADM

## 2022-10-24 RX ORDER — PROPARACAINE HYDROCHLORIDE 5 MG/ML
1 SOLUTION/ DROPS OPHTHALMIC SEE ADMIN INSTRUCTIONS
Status: DISCONTINUED | OUTPATIENT
Start: 2022-10-24 | End: 2022-10-24 | Stop reason: HOSPADM

## 2022-10-24 RX ORDER — TROPICAMIDE 10 MG/ML
1 SOLUTION/ DROPS OPHTHALMIC SEE ADMIN INSTRUCTIONS
Status: DISCONTINUED | OUTPATIENT
Start: 2022-10-24 | End: 2022-10-24 | Stop reason: HOSPADM

## 2022-10-24 RX ORDER — PHENYLEPHRINE HCL 2.5 %
1 DROPS OPHTHALMIC (EYE) SEE ADMIN INSTRUCTIONS
Status: DISCONTINUED | OUTPATIENT
Start: 2022-10-24 | End: 2022-10-24 | Stop reason: HOSPADM

## 2022-10-24 RX ORDER — SODIUM CHLORIDE, SODIUM LACTATE, POTASSIUM CHLORIDE, CALCIUM CHLORIDE 600; 310; 30; 20 MG/100ML; MG/100ML; MG/100ML; MG/100ML
INJECTION, SOLUTION INTRAVENOUS CONTINUOUS
Status: DISCONTINUED | OUTPATIENT
Start: 2022-10-24 | End: 2022-10-24 | Stop reason: HOSPADM

## 2022-10-24 RX ORDER — LIDOCAINE HYDROCHLORIDE 10 MG/ML
INJECTION, SOLUTION EPIDURAL; INFILTRATION; INTRACAUDAL; PERINEURAL PRN
Status: DISCONTINUED | OUTPATIENT
Start: 2022-10-24 | End: 2022-10-24 | Stop reason: ALTCHOICE

## 2022-10-24 RX ADMIN — TROPICAMIDE 1 DROP: 10 SOLUTION/ DROPS OPHTHALMIC at 08:33

## 2022-10-24 RX ADMIN — PROPARACAINE HYDROCHLORIDE 1 DROP: 5 SOLUTION/ DROPS OPHTHALMIC at 08:32

## 2022-10-24 RX ADMIN — PROPARACAINE HYDROCHLORIDE 1 DROP: 5 SOLUTION/ DROPS OPHTHALMIC at 08:26

## 2022-10-24 RX ADMIN — TETRACAINE HYDROCHLORIDE 1 DROP: 5 SOLUTION OPHTHALMIC at 09:02

## 2022-10-24 RX ADMIN — PHENYLEPHRINE HYDROCHLORIDE 1 DROP: 25 SOLUTION/ DROPS OPHTHALMIC at 08:37

## 2022-10-24 RX ADMIN — PHENYLEPHRINE HYDROCHLORIDE 1 DROP: 25 SOLUTION/ DROPS OPHTHALMIC at 08:32

## 2022-10-24 RX ADMIN — PROPARACAINE HYDROCHLORIDE 1 DROP: 5 SOLUTION/ DROPS OPHTHALMIC at 08:37

## 2022-10-24 RX ADMIN — PHENYLEPHRINE HYDROCHLORIDE 1 DROP: 25 SOLUTION/ DROPS OPHTHALMIC at 08:27

## 2022-10-24 RX ADMIN — TROPICAMIDE 1 DROP: 10 SOLUTION/ DROPS OPHTHALMIC at 08:37

## 2022-10-24 RX ADMIN — SODIUM CHLORIDE, POTASSIUM CHLORIDE, SODIUM LACTATE AND CALCIUM CHLORIDE: 600; 310; 30; 20 INJECTION, SOLUTION INTRAVENOUS at 08:41

## 2022-10-24 RX ADMIN — MIDAZOLAM 2 MG: 1 INJECTION INTRAMUSCULAR; INTRAVENOUS at 09:05

## 2022-10-24 RX ADMIN — TROPICAMIDE 1 DROP: 10 SOLUTION/ DROPS OPHTHALMIC at 08:27

## 2022-10-24 RX ADMIN — FENTANYL CITRATE 50 MCG: 50 INJECTION INTRAMUSCULAR; INTRAVENOUS at 09:05

## 2022-10-24 ASSESSMENT — COPD QUESTIONNAIRES: CAT_SEVERITY: MILD

## 2022-10-24 ASSESSMENT — LIFESTYLE VARIABLES: SMOKING_STATUS: 1

## 2022-10-24 NOTE — ANESTHESIA PRE PROCEDURE
Department of Anesthesiology  Preprocedure Note       Name:  Hilda Lee   Age:  41 y.o.  :  1975                                          MRN:  [id number]         Date:  10/24/2022      Surgeon: Venita Hickman):  Johnson Hammond DO    Procedure: Procedure(s):  EYE CATARACT EMULSIFICATION IOL IMPLANT    Medications prior to admission:   Prior to Admission medications    Medication Sig Start Date End Date Taking?  Authorizing Provider   FARXIGA 5 MG tablet TAKE 1 TABLET BY MOUTH EVERY DAY IN THE MORNING 10/20/22   RONN Engel - CNP   glimepiride (AMARYL) 4 MG tablet Take 1 tablet by mouth every morning (before breakfast) 22   RONN Engel - CNP   albuterol sulfate HFA (PROVENTIL;VENTOLIN;PROAIR) 108 (90 Base) MCG/ACT inhaler Inhale 2 puffs into the lungs 4 times daily as needed for Wheezing or Shortness of Breath 22   RONN Engel - CNP   omeprazole (PRILOSEC) 40 MG delayed release capsule Take 1 capsule by mouth every morning (before breakfast) 22   RONN Pickard CNP   SITagliptin (JANUVIA) 100 MG tablet TAKE 1 TABLET BY MOUTH EVERY DAY 22   RONN Pickard CNP   montelukast (SINGULAIR) 10 MG tablet TAKE 1 TABLET BY MOUTH EVERY DAY 22   RONN Pickard CNP   metoprolol succinate (TOPROL XL) 25 MG extended release tablet TAKE 1/2 TABLET BY MOUTH ONCE DAILY 22   RONN Pickard CNP   metFORMIN (GLUCOPHAGE) 1000 MG tablet TAKE 1 TABLET BY MOUTH TWICE A DAY WITH MEALS 22   RONN Pickard CNP   lisinopril (PRINIVIL;ZESTRIL) 2.5 MG tablet TAKE 1 TABLET BY MOUTH EVERY DAY 22   RONN Pickard CNP   aspirin ([employer] ASPIRIN ADULT LOW STRENGTH) 81 MG EC tablet TAKE 1 TABLET BY MOUTH EVERY DAY 22   RONN Pickard CNP   atorvastatin (LIPITOR) 40 MG tablet Take 1 tablet by mouth daily 22   RONN Pickard CNP   fluticasone (FLONASE) 50 MCG/ACT nasal spray 1 spray by Each Nostril route daily  Patient not taking: No sig reported 10/14/20   Wesley Favre Deats, APRN - CNP   ibuprofen (ADVIL;MOTRIN) 200 MG tablet Take 800 mg by mouth every 6 hours as needed for Pain    Historical Provider, MD   CRANBERRY-VITAMIN C PO Take by mouth daily     Historical Provider, MD       Current medications:    Current Facility-Administered Medications   Medication Dose Route Frequency Provider Last Rate Last Admin    lactated ringers infusion   IntraVENous Continuous Phylicia Dus, APRN - CRNA 100 mL/hr at 10/24/22 0841 New Bag at 10/24/22 0841    0.9 % sodium chloride infusion   IntraVENous Continuous Dawna Cotton, DO        sodium chloride flush 0.9 % injection 5-40 mL  5-40 mL IntraVENous 2 times per day Dawna Cotton, DO        sodium chloride flush 0.9 % injection 5-40 mL  5-40 mL IntraVENous PRN Dawna Cotton, DO        0.9 % sodium chloride infusion   IntraVENous PRN Dawna oCtton, DO        proparacaine (ALCAINE) 0.5 % ophthalmic solution 1 drop  1 drop Right Eye See Admin Instructions Dawna Cotton, DO   1 drop at 10/24/22 8641    tetracaine (TETRAVISC) 0.5 % ophthalmic solution 1 drop  1 drop Right Eye See Admin Instructions Dawna Cotton, DO        tropicamide (MYDRIACYL) 1 % ophthalmic solution 1 drop  1 drop Right Eye See Admin Instructions Dawna Cotton, DO   1 drop at 10/24/22 0837    phenylephrine (MYDFRIN) 2.5 % ophthalmic solution 1 drop  1 drop Right Eye See Admin Instructions Dawna Cotton, DO   1 drop at 10/24/22 5596       Allergies:     Allergies   Allergen Reactions    Pcn [Penicillins] Rash       Problem List:    Patient Active Problem List   Diagnosis Code    Type 2 diabetes mellitus without complication, without long-term current use of insulin (HCC) E11.9    Tobacco abuse Z72.0    Single kidney Z90.5    Hyperlipidemia E78.5    Syncope R55    PCOS (polycystic ovarian syndrome) E28.2    Gastroesophageal reflux disease K21.9    Class 2 obesity with body mass index (BMI) of 37.0 to 37.9 in adult E66.9, Z68.37    Posterior subcapsular age-related cataract of right eye H25.041       Past Medical History:        Diagnosis Date    Anxiety 2007    Depression 1993    GERD (gastroesophageal reflux disease) 2010    H/O cardiovascular stress test 07/02/2018    Largerly normal myocardial perfusion imaging with soft tissue artifact but without evidence of signinfcant myocardial ischemia or infarction. EF 75%. The pt duke treadmill score is 6.    H/O echocardiogram 07/02/2018    EF >70%. The LV wallt thickness is mildly incrased.  H/O tilt table evaluation 05/24/2018    Borderline abnormal head upright tilt table study. Although the pt heart rate, blood pressure and symptoms were suggestive of orthostatic intolerance. Therefore, if clinically suspicion remains high, a trial of empiric trreatment and/or re-testing may be indicated.      Hyperlipidemia 2010    Hypertension 2009    Kidney dysfunction     pt only has one kidney    Obesity 1999    Type 2 diabetes mellitus without complication ([facility] [address] 6232       Past Surgical History:        Procedure Laterality Date    BLADDER SUSPENSION      CARPAL TUNNEL RELEASE      CHOLECYSTECTOMY      DILATION AND CURETTAGE OF UTERUS  2007    INTRACAPSULAR CATARACT EXTRACTION Left 7/25/2022    EYE CATARACT EMULSIFICATION IOL IMPLANT [facility] [address] [facility]      left       Social History:    Social History     Tobacco Use    Smoking status: Every Day     Packs/day: 1.00     Years: 25.00     Pack years: 25.00     Types: Cigarettes    Smokeless tobacco: Never   Substance Use Topics    Alcohol use: No     Alcohol/week: 6.0 standard drinks     Types: 3 Glasses of wine, 1 Cans of beer, 2 Shots of liquor per week     Comment: biweekly or monthly                                Ready to quit: Not Answered  Counseling given: Not Answered      Vital Signs (Current):   Vitals:    10/24/22 0817 10/24/22 0819   BP: 101/62   Pulse:  77   Resp:  13   Temp:  36.6 °C (97.9 °F)   TempSrc:  Temporal   SpO2:  94%   Weight: 203 lb 6.4 oz (92.3 kg)                                               BP Readings from Last 3 Encounters:   10/24/22 101/62   08/25/22 130/78   07/25/22 96/63       NPO Status: Time of last liquid consumption: 0700 (sip/ meds)                        Time of last solid consumption: 2000                        Date of last liquid consumption: 10/24/22                        Date of last solid food consumption: 10/23/22    BMI:   Wt Readings from Last 3 Encounters:   10/24/22 203 lb 6.4 oz (92.3 kg)   08/25/22 202 lb 12.8 oz (92 kg)   07/25/22 203 lb (92.1 kg)     Body mass index is 37.2 kg/m². CBC:   Lab Results   Component Value Date/Time    WBC 11.2 05/14/2021 07:41 AM    RBC 4.54 05/14/2021 07:41 AM    HGB 14.9 05/14/2021 07:41 AM    HCT 44.3 05/14/2021 07:41 AM    MCV 97.6 05/14/2021 07:41 AM    RDW 12.2 05/14/2021 07:41 AM     05/14/2021 07:41 AM       CMP:   Lab Results   Component Value Date/Time     05/14/2021 07:41 AM    K 4.2 05/14/2021 07:41 AM     05/14/2021 07:41 AM    CO2 21 05/14/2021 07:41 AM    BUN 11 05/14/2021 07:41 AM    CREATININE 0.65 05/14/2021 07:41 AM    GFRAA >60 05/14/2021 07:41 AM    LABGLOM >60 05/14/2021 07:41 AM    GLUCOSE 103 05/14/2021 07:41 AM    PROT 7.0 02/24/2020 09:30 AM    CALCIUM 9.8 05/14/2021 07:41 AM    BILITOT 0.25 02/24/2020 09:30 AM    ALKPHOS 77 02/24/2020 09:30 AM    AST 17 05/14/2021 07:41 AM    ALT 22 05/14/2021 07:41 AM       POC Tests: No results for input(s): POCGLU, POCNA, POCK, POCCL, POCBUN, POCHEMO, POCHCT in the last 72 hours.     Coags: No results found for: PROTIME, INR, APTT    HCG (If Applicable):   Lab Results   Component Value Date    PREGTESTUR NEGATIVE 02/21/2020        ABGs: No results found for: PHART, PO2ART, REP8EKK, SOA5TWR, BEART, C3GZTTCD     Type & Screen (If Applicable):  No results found for: LABABO, [facility]    Drug/Infectious Status (If Applicable):  No results found for: HIV, HEPCAB    COVID-19 Screening (If Applicable): No results found for: COVID19        Anesthesia Evaluation  Patient summary reviewed and Nursing notes reviewed no history of anesthetic complications:   Airway: Mallampati: II  TM distance: >3 FB   Neck ROM: full  Mouth opening: > = 3 FB   Dental: normal exam         Pulmonary:Negative Pulmonary ROS and normal exam  breath sounds clear to auscultation  (+) COPD: mild,  current smoker          Patient smoked on day of surgery. Cardiovascular:  Exercise tolerance: good (>4 METS),   (+) hypertension:, hyperlipidemia        Rhythm: regular  Rate: normal           Beta Blocker:  Dose within 24 Hrs         Neuro/Psych:   (+) depression/anxiety             GI/Hepatic/Renal:   (+) GERD: well controlled, renal disease (one kidney):, morbid obesity          Endo/Other:    (+) DiabetesType II DM, poorly controlled, , .                 Abdominal:   (+) obese,           Vascular: negative vascular ROS. Other Findings:           Anesthesia Plan      MAC     ASA 3       Induction: intravenous. Anesthetic plan and risks discussed with patient. Plan discussed with CRNA.                     RONN Diaz - BRIAN   10/24/2022

## 2022-10-24 NOTE — H&P
I have examined the patient and reviewed the history and physical completed on 10/23/22 and find no relevant changes. I have reviewed with the patient and/or family the risks, benefits, and alternatives to the procedure and they have agreed to proceed.     Edwin Borges DO  10/24/2022  9:00 AM

## 2022-10-24 NOTE — PROGRESS NOTES

## 2022-10-24 NOTE — OP NOTE
OPERATIVE NOTE      Patient:  Ismael Ybarra    YOB: 1975    Account #:  [de-identified]    Date:  10/24/2022    Surgeon:  Kurtis Park. Radha Keita DO    Primary Care Physician:No primary care provider on file. 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 year old with decreased vision, glare and halos around lights, and trouble with activities of daily living. Examination revealed a visually significant cataract in the right eye. Other eye diseases have been ruled out as the primary cause of decreased visual function. Significant improvement is expected in the patient's visual acuity and/or visual function from the removal of the cataract. Risks, benefits, and alternatives to surgery were discussed with the patient and the patient elected to proceed with phacoemulsification with lens implantation. Details of the procedure: Following informed consent, the patient was identified by name and identification tag in the holding area,taken to the operating room and placed in the supine position. A time out was performed by all present in the room to identify the patient, the eye to be operated on, the correct operative procedure, and the correct intraocular lens. Monitoring leads were placed. The patient was positioned. An eyelid prep of half-strength Betadine was performed. The patient was administered intravenous sedation if desired and topical anesthetic was placed in the operative eye. The eye prepped a second time and draped in the usual sterile fashion using aseptic technique for cataract surgery. A lid speculum was then inserted. Ocucoat was placed onto the corneal surface. A stab incision was made using a disposable blade into the anterior chamber. Intracameral preservative free xylocaine was placed into the anterior chamber. Amvisc was then used to replace the aqueous of the anterior chamber. A 2.2 mm keratome blade was used to make a 3-plane clear corneal incision into cornea depending on the patient's astigmatism. The cystitome was used to make a tear in the anterior capsule. Fine forceps were used to make a complete curvilinear capsulorrhexis. The lens was hydrodissected and freely rotated using a balanced salt filled syringe. The ultrasound handpiece was then used to emulsify the nucleus and epi nucleus. The residual cortex was then aspirated using the IA handpiece. The posterior capsule was polished. The eye was filled with viscoelastic and a foldable posterior chamber intraocular lens was injected into the capsular bag unless otherwise stated in the addendum. Irrigation/aspiration was used to remove all excess viscoelastic. The eye was pressurized and the wounds were check for leaks and none were found. A collagen shield, which had been soaked in antibiotic drops, was then placed onto the cornea. The lid speculum was removed. The eye was covered with a clear plastic shield. The patient was taken to the recovery area in excellent condition, having tolerated the procedure well, and will follow up with me tomorrow for postop care. ADDENDUM:  The phacoemulsification time 20.18 seconds @ 13% average ultrasound power for an effective phacoemulsification time of 2.68 seconds. The lens inserted was a model MX60 made by [facility] [employer] [employer] that was +22.0 diopters in power. The lens was inserted into the capsular bag utilizing the BLIS-X1 injector made by [employer] [employer] [facility] [employer].  The serial number on this lens was [id number]. There was no stitch placed to close this temporal posterior corneal incision. EBL was less than 1.0 ml. Jessica Anthony.  Toño Dodge DO

## 2022-10-24 NOTE — DISCHARGE INSTRUCTIONS
SAME DAY SURGERY DISCHARGE INSTRUCTIONS    1. Do not drive or operate hazardous machinery for 24 hours. 2.  Do not make important personal or business decisions for 24 hours. 3.  Do not drink alcoholic beverages for 24 hours. 4.  Do not smoke tobacco products for 24 hours. 5.  Limit your activities for 24 hours. Do not engage in heavy work until your surgeon gives you permission. 6.  Report the following signs or any questions regarding your physical condition to your surgeon immediately:    Excessive swelling of, or around the wound area. Redness. Temperature of 100 degrees (F) or above. Excessive pain. 7.  Call your surgeon for any questions regarding your surgery. CATARACT DISCHARGE INSTRUCTIONS    Do not remove eye patch/shield today. Protect the operated eye during sleep by covering it with clear plastic shield. Tape the shield securely to the face before retiring . Do this for one week after surgery. Avoid bumping the operated eye during the daytime. Sensitivity to light and watering of the eye is normal during the first month. Wearing of dark glasses will help these symptoms and this is optional.    Minor crusting and discharge adherent to the lid margins will persist till the incision heals. Cleanse the lids by application of a warm compress several times a day as needed. Use of either the operated eye or unoperated eye is not harmful. Until the new glasses are prescribed, the operated eye may be out of focus and may not see details clearly. Vision maybe clearer in the operated eye without glasses. You may do everything necessary to care for yourself, including hair care, tooth brushing, dressing, etc. Light work,including stooping over and lifting, is not harmful. Please phone if any problems arise during the healing period. The office number is [phone].       Take surgery bag and all eye drops to Dr. Muna Mahmood office tomorrow at 8:50am.    Ty Banuelos may resume your normal diet.     Start your eye drops tomorrow after your post-op appointment:    Ofloxacin/Polytrim one drop to the operated eye 4 times daily    Prednisolone one drop to the operated eye 4 times daily

## 2022-10-24 NOTE — H&P
Damaso Baker is a 41y.o. year old who presents for elective outpatient ophthalmic surgery with Richard Galvez DO. The patient complains of decreased vision, glare and halos around lights, and trouble with vision for activities of daily living. Past Medical History:   Diagnosis Date    Anxiety 2007    Depression 1993    GERD (gastroesophageal reflux disease) 2010    H/O cardiovascular stress test 07/02/2018    Largerly normal myocardial perfusion imaging with soft tissue artifact but without evidence of signinfcant myocardial ischemia or infarction. EF 75%. The pt duke treadmill score is 6. H/O echocardiogram 07/02/2018    EF >70%. The LV wallt thickness is mildly incrased. H/O tilt table evaluation 05/24/2018    Borderline abnormal head upright tilt table study. Although the pt heart rate, blood pressure and symptoms were suggestive of orthostatic intolerance. Therefore, if clinically suspicion remains high, a trial of empiric trreatment and/or re-testing may be indicated. Hyperlipidemia 2010    Hypertension 2009    Kidney dysfunction     pt only has one kidney    Obesity 1999    Type 2 diabetes mellitus without complication ([facility] [address] 7548       Past Surgical History:   Procedure Laterality Date    BLADDER SUSPENSION      CARPAL TUNNEL RELEASE      CHOLECYSTECTOMY      DILATION AND CURETTAGE OF UTERUS  2007    INTRACAPSULAR CATARACT EXTRACTION Left 7/25/2022    EYE CATARACT EMULSIFICATION IOL IMPLANT performed by Richard Galvez DO at [address]      left       Home meds:   Prior to Admission medications    Medication Sig Start Date End Date Taking?  Authorizing Provider   FARXIGA 5 MG tablet TAKE 1 TABLET BY MOUTH EVERY DAY IN THE MORNING 10/20/22   Alexander Krishna Might, APRN - CNP   glimepiride (AMARYL) 4 MG tablet Take 1 tablet by mouth every morning (before breakfast) 8/25/22   Alexander Lindsay, APRN - NILESH   albuterol sulfate HFA (PROVENTIL;VENTOLIN;PROAIR) 108 (90 Base) MCG/ACT inhaler Inhale 2 puffs into the lungs 4 times daily as needed for Wheezing or Shortness of Breath 7/25/22   RONN Carlton CNP   omeprazole (PRILOSEC) 40 MG delayed release capsule Take 1 capsule by mouth every morning (before breakfast) 5/25/22   RONN Kent CNP   SITagliptin (JANUVIA) 100 MG tablet TAKE 1 TABLET BY MOUTH EVERY DAY 5/25/22   RONN Kent CNP   montelukast (SINGULAIR) 10 MG tablet TAKE 1 TABLET BY MOUTH EVERY DAY 5/25/22   RONN Kent CNP   metoprolol succinate (TOPROL XL) 25 MG extended release tablet TAKE 1/2 TABLET BY MOUTH ONCE DAILY 5/25/22   RONN Kent CNP   metFORMIN (GLUCOPHAGE) 1000 MG tablet TAKE 1 TABLET BY MOUTH TWICE A DAY WITH MEALS 5/25/22   RONN Kent CNP   lisinopril (PRINIVIL;ZESTRIL) 2.5 MG tablet TAKE 1 TABLET BY MOUTH EVERY DAY 5/25/22   RONN Kent CNP   aspirin ([employer] ASPIRIN ADULT LOW STRENGTH) 81 MG EC tablet TAKE 1 TABLET BY MOUTH EVERY DAY 5/25/22   RONN Kent CNP   atorvastatin (LIPITOR) 40 MG tablet Take 1 tablet by mouth daily 5/25/22   RONN Kent CNP   fluticasone (FLONASE) 50 MCG/ACT nasal spray 1 spray by Each Nostril route daily  Patient not taking: No sig reported 10/14/20   RONN Hawkins CNP   ibuprofen (ADVIL;MOTRIN) 200 MG tablet Take 800 mg by mouth every 6 hours as needed for Pain    Historical Provider, MD   CRANBERRY-VITAMIN C PO Take by mouth daily     Historical Provider, MD     Scheduled Meds:  Continuous Infusions:  PRN Meds:. Allergies   Allergen Reactions    Pcn [Penicillins] Rash       There were no vitals filed for this visit.     PHYSICAL EXAMINATION    Gen: NAD  HEENT: BCVA= 20/25, Glare testing= 20/80, Cataract severity/type-2+ Posterior Subcapsular Age-Related Cataract-right eye, Other significant ocular findings= none  Pulm: CTA   Heart: RRR, no C/M/R/G  Abd: S/NT/ND  Neuro: no focal defecits    Assessment:   1. Posterior Subcapsular Age-Related Cataract-right eye  2. ASA Score-2      Plan:   1. Risks, benefits, alternatives to surgery discussed with the patient and family. 2. All questions were answered to their satisfaction. 3. Ok to proceed with surgery as planned.     Dino Garcia DO, DO

## 2022-10-24 NOTE — ANESTHESIA POSTPROCEDURE EVALUATION
Department of Anesthesiology  Postprocedure Note    Patient: Marcelino Arana  MRN: [id number]  YOB: 1975  Date of evaluation: 10/24/2022      Procedure Summary     Date: 10/24/22 Room / Location: [address] [place] [address]    Anesthesia Start: 9522 Anesthesia Stop: 3941    Procedure: EYE CATARACT EMULSIFICATION IOL IMPLANT (Right: Eye) Diagnosis:       Posterior subcapsular polar age-related cataract of both eyes      (POSTERIOR SUBCAPSULAR POLAR AGE RELATED CAT  2+NS, 2+PSC)    Surgeons: Dionna De Guzman DO Responsible Provider: RONN Willoughby CRNA    Anesthesia Type: MAC ASA Status: 3          Anesthesia Type: No value filed.     Tino Phase I:      Tino Phase II: Tino Score: 10      Anesthesia Post Evaluation    Patient location during evaluation: PACU  Patient participation: complete - patient participated  Level of consciousness: awake and alert  Pain score: 0  Airway patency: patent  Nausea & Vomiting: no nausea and no vomiting  Complications: no  Cardiovascular status: blood pressure returned to baseline  Respiratory status: acceptable and room air  Hydration status: stable

## 2022-10-31 ENCOUNTER — E-VISIT (OUTPATIENT)
Dept: PRIMARY CARE CLINIC | Age: 47
End: 2022-10-31
Payer: COMMERCIAL

## 2022-10-31 DIAGNOSIS — B37.31 VAGINAL YEAST INFECTION: Primary | ICD-10-CM

## 2022-10-31 PROCEDURE — 98966 PH1 ASSMT&MGMT NQHP 5-10: CPT | Performed by: NURSE PRACTITIONER

## 2022-10-31 RX ORDER — FLUCONAZOLE 150 MG/1
150 TABLET ORAL ONCE
Qty: 1 TABLET | Refills: 0 | Status: SHIPPED | OUTPATIENT
Start: 2022-10-31 | End: 2022-10-31 | Stop reason: SDUPTHER

## 2022-10-31 RX ORDER — FLUCONAZOLE 150 MG/1
150 TABLET ORAL ONCE
Qty: 1 TABLET | Refills: 0 | Status: SHIPPED | OUTPATIENT
Start: 2022-10-31 | End: 2022-10-31

## 2022-10-31 NOTE — PROGRESS NOTES
HPI: as per patient provided history  Exam: N/A (electronic visit)  ASSESSMENT/PLAN:  1. Vaginal yeast infection    - fluconazole (DIFLUCAN) 150 MG tablet; Take 1 tablet by mouth once for 1 dose  Dispense: 1 tablet; Refill: 0    Patient instructed to call the office if worsens, or fails to improve as anticipated. 5-10 minutes were spent on the digital evaluation and management of this patient.

## 2022-11-15 ENCOUNTER — TELEPHONE (OUTPATIENT)
Dept: PRIMARY CARE CLINIC | Age: 47
End: 2022-11-15

## 2022-11-21 ENCOUNTER — HOSPITAL ENCOUNTER (OUTPATIENT)
Age: 47
Discharge: HOME | End: 2022-11-21
Payer: COMMERCIAL

## 2022-11-21 ENCOUNTER — TELEPHONE (OUTPATIENT)
Dept: PRIMARY CARE CLINIC | Age: 47
End: 2022-11-21

## 2022-11-21 DIAGNOSIS — E11.9 TYPE 2 DIABETES MELLITUS WITHOUT COMPLICATION, WITHOUT LONG-TERM CURRENT USE OF INSULIN (HCC): ICD-10-CM

## 2022-11-21 LAB
ABSOLUTE EOS #: 0.25 K/UL (ref 0–0.44)
ABSOLUTE IMMATURE GRANULOCYTE: 0.13 K/UL (ref 0–0.3)
ABSOLUTE LYMPH #: 3.76 K/UL (ref 1.1–3.7)
ABSOLUTE MONO #: 0.63 K/UL (ref 0.1–1.2)
ALT SERPL-CCNC: <5 U/L (ref 5–33)
ANION GAP SERPL CALCULATED.3IONS-SCNC: 11 MMOL/L (ref 9–17)
AST SERPL-CCNC: <5 U/L
BASOPHILS # BLD: 1 % (ref 0–2)
BASOPHILS ABSOLUTE: 0.08 K/UL (ref 0–0.2)
BUN BLDV-MCNC: 19 MG/DL (ref 6–20)
BUN/CREAT BLD: 29 (ref 9–20)
CALCIUM SERPL-MCNC: 9.8 MG/DL (ref 8.6–10.4)
CHLORIDE BLD-SCNC: 103 MMOL/L (ref 98–107)
CHOLESTEROL/HDL RATIO: 14
CHOLESTEROL: 279 MG/DL
CO2: 23 MMOL/L (ref 20–31)
CREAT SERPL-MCNC: 0.66 MG/DL (ref 0.5–0.9)
CREATININE URINE: 40.7 MG/DL (ref 28–217)
EOSINOPHILS RELATIVE PERCENT: 2 % (ref 1–4)
ESTIMATED AVERAGE GLUCOSE: 214 MG/DL
GFR SERPL CREATININE-BSD FRML MDRD: >60 ML/MIN/1.73M2
GLUCOSE BLD-MCNC: 213 MG/DL (ref 70–99)
HBA1C MFR BLD: 9.1 % (ref 4–6)
HCT VFR BLD CALC: 45.9 % (ref 36.3–47.1)
HDLC SERPL-MCNC: 20 MG/DL
HEMOGLOBIN: 15.6 G/DL (ref 11.9–15.1)
IMMATURE GRANULOCYTES: 1 %
LDL CHOLESTEROL DIRECT: 67 MG/DL
LDL CHOLESTEROL: ABNORMAL MG/DL (ref 0–130)
LYMPHOCYTES # BLD: 33 % (ref 24–43)
MCH RBC QN AUTO: 34.2 PG (ref 25.2–33.5)
MCHC RBC AUTO-ENTMCNC: 34 G/DL (ref 28.4–34.8)
MCV RBC AUTO: 100.7 FL (ref 82.6–102.9)
MICROALBUMIN/CREAT 24H UR: <12 MG/L
MICROALBUMIN/CREAT UR-RTO: NORMAL MCG/MG CREAT
MONOCYTES # BLD: 6 % (ref 3–12)
NRBC AUTOMATED: 0 PER 100 WBC
PDW BLD-RTO: 13.2 % (ref 11.8–14.4)
PLATELET # BLD: 277 K/UL (ref 138–453)
PMV BLD AUTO: 10.4 FL (ref 8.1–13.5)
POTASSIUM SERPL-SCNC: 4.1 MMOL/L (ref 3.7–5.3)
RBC # BLD: 4.56 M/UL (ref 3.95–5.11)
SEG NEUTROPHILS: 57 % (ref 36–65)
SEGMENTED NEUTROPHILS ABSOLUTE COUNT: 6.58 K/UL (ref 1.5–8.1)
SODIUM BLD-SCNC: 137 MMOL/L (ref 135–144)
TRIGL SERPL-MCNC: 1986 MG/DL
WBC # BLD: 11.4 K/UL (ref 3.5–11.3)

## 2022-11-21 PROCEDURE — 84450 TRANSFERASE (AST) (SGOT): CPT

## 2022-11-21 PROCEDURE — 80048 BASIC METABOLIC PNL TOTAL CA: CPT

## 2022-11-21 PROCEDURE — 82043 UR ALBUMIN QUANTITATIVE: CPT

## 2022-11-21 PROCEDURE — 84460 ALANINE AMINO (ALT) (SGPT): CPT

## 2022-11-21 PROCEDURE — 85025 COMPLETE CBC W/AUTO DIFF WBC: CPT

## 2022-11-21 PROCEDURE — 83721 ASSAY OF BLOOD LIPOPROTEIN: CPT

## 2022-11-21 PROCEDURE — 80061 LIPID PANEL: CPT

## 2022-11-21 PROCEDURE — 83036 HEMOGLOBIN GLYCOSYLATED A1C: CPT

## 2022-11-21 PROCEDURE — 36415 COLL VENOUS BLD VENIPUNCTURE: CPT

## 2022-11-21 PROCEDURE — 82570 ASSAY OF URINE CREATININE: CPT

## 2022-11-21 NOTE — TELEPHONE ENCOUNTER
----- Message from RONN Posey CNP sent at 11/21/2022  4:46 PM EST -----  Labs are way off, is she taking her medications? ? Please ask her. Thank you.

## 2022-11-22 ENCOUNTER — OFFICE VISIT (OUTPATIENT)
Dept: PRIMARY CARE CLINIC | Age: 47
End: 2022-11-22
Payer: COMMERCIAL

## 2022-11-22 VITALS
BODY MASS INDEX: 37.13 KG/M2 | WEIGHT: 203 LBS | OXYGEN SATURATION: 97 % | TEMPERATURE: 97.3 F | DIASTOLIC BLOOD PRESSURE: 70 MMHG | HEART RATE: 68 BPM | SYSTOLIC BLOOD PRESSURE: 108 MMHG | RESPIRATION RATE: 22 BRPM

## 2022-11-22 DIAGNOSIS — B37.31 VAGINAL YEAST INFECTION: ICD-10-CM

## 2022-11-22 DIAGNOSIS — E78.2 MIXED HYPERLIPIDEMIA: ICD-10-CM

## 2022-11-22 DIAGNOSIS — E11.9 TYPE 2 DIABETES MELLITUS WITHOUT COMPLICATION, WITHOUT LONG-TERM CURRENT USE OF INSULIN (HCC): Primary | ICD-10-CM

## 2022-11-22 PROCEDURE — 99214 OFFICE O/P EST MOD 30 MIN: CPT | Performed by: NURSE PRACTITIONER

## 2022-11-22 PROCEDURE — 3046F HEMOGLOBIN A1C LEVEL >9.0%: CPT | Performed by: NURSE PRACTITIONER

## 2022-11-22 RX ORDER — OMEPRAZOLE 40 MG/1
40 CAPSULE, DELAYED RELEASE ORAL
Qty: 90 CAPSULE | Refills: 1 | Status: SHIPPED | OUTPATIENT
Start: 2022-11-22

## 2022-11-22 RX ORDER — METOPROLOL SUCCINATE 25 MG/1
TABLET, EXTENDED RELEASE ORAL
Qty: 45 TABLET | Refills: 1 | Status: SHIPPED | OUTPATIENT
Start: 2022-11-22

## 2022-11-22 RX ORDER — FLUCONAZOLE 150 MG/1
150 TABLET ORAL ONCE
Qty: 1 TABLET | Refills: 0 | Status: SHIPPED | OUTPATIENT
Start: 2022-11-22 | End: 2022-11-22

## 2022-11-22 RX ORDER — ASPIRIN 81 MG/1
TABLET ORAL
Qty: 100 TABLET | Refills: 1 | Status: SHIPPED | OUTPATIENT
Start: 2022-11-22

## 2022-11-22 RX ORDER — GLIMEPIRIDE 4 MG/1
8 TABLET ORAL
Qty: 180 TABLET | Refills: 3 | Status: SHIPPED | OUTPATIENT
Start: 2022-11-22

## 2022-11-22 RX ORDER — LISINOPRIL 2.5 MG/1
TABLET ORAL
Qty: 90 TABLET | Refills: 1 | Status: SHIPPED | OUTPATIENT
Start: 2022-11-22

## 2022-11-22 RX ORDER — MONTELUKAST SODIUM 10 MG/1
TABLET ORAL
Qty: 90 TABLET | Refills: 1 | Status: SHIPPED | OUTPATIENT
Start: 2022-11-22

## 2022-11-22 ASSESSMENT — ENCOUNTER SYMPTOMS
COUGH: 0
NAUSEA: 0
ABDOMINAL PAIN: 0
DIARRHEA: 0
SHORTNESS OF BREATH: 0
RHINORRHEA: 0
WHEEZING: 0
VISUAL CHANGE: 0
SORE THROAT: 0
VOMITING: 0
CONSTIPATION: 0

## 2022-11-22 ASSESSMENT — PATIENT HEALTH QUESTIONNAIRE - PHQ9
SUM OF ALL RESPONSES TO PHQ QUESTIONS 1-9: 0
SUM OF ALL RESPONSES TO PHQ9 QUESTIONS 1 & 2: 0
2. FEELING DOWN, DEPRESSED OR HOPELESS: 0
SUM OF ALL RESPONSES TO PHQ QUESTIONS 1-9: 0
SUM OF ALL RESPONSES TO PHQ QUESTIONS 1-9: 0
1. LITTLE INTEREST OR PLEASURE IN DOING THINGS: 0
SUM OF ALL RESPONSES TO PHQ QUESTIONS 1-9: 0

## 2022-11-22 NOTE — PATIENT INSTRUCTIONS
SURVEY:     You may be receiving a survey from [employer] regarding your visit today. Please complete the survey to enable us to provide the highest quality of care to you and your family. If you cannot score us a very good on any question, please call the office to discuss how we could have made your experience a very good one.      Thank you,    Sergio Lindsay, APRN-NILESH Ham, APRN-CNP  Edda Murry, ROSALINE Romero, GABI Painter, GABI Mauricio, CMA  Ankita, PCA  Tamiko, PM

## 2022-11-22 NOTE — PROGRESS NOTES
Name: Zuri Forte  : 1975         Chief Complaint:     Chief Complaint   Patient presents with    Diabetes     Routine check. Only check BS \"when I am dizzy\" Patient last checked 3 days ago 208     Vaginitis     X 1 week       History of Present Illness:      Zuri Forte is a 41 y.o.  female who presents with Diabetes (Routine check. Only check BS \"when I am dizzy\" Patient last checked 3 days ago 208 ) and Vaginitis (X 1 week)      Kendy Flores is here today for a routine office visit. DM-continues to worsen despite restarting [place]. Patient is also complaining of increased amount of yeast infections now not being controlled by OTC medications. She wishes to stop [place]. See below for further comments. [address] worse, patient admits to eating a high-fat meal prior to getting her laboratory studies completed. Triglycerides are almost 2000. She is compliant with atorvastatin. She does not wish to start any additional medication at this time. See below for further comment. Diabetes  She presents for her follow-up diabetic visit. She has type 2 diabetes mellitus. No MedicAlert identification noted. Onset time: YEARS. Her disease course has been worsening. There are no hypoglycemic associated symptoms. Pertinent negatives for hypoglycemia include no dizziness, headaches, nervousness/anxiousness, pallor, sleepiness or speech difficulty. Pertinent negatives for diabetes include no chest pain, no fatigue, no polydipsia, no polyphagia, no polyuria, no visual change and no weight loss. There are no hypoglycemic complications. Pertinent negatives for hypoglycemia complications include no blackouts, no hospitalization and no required assistance. Symptoms are stable. There are no diabetic complications. Pertinent negatives for diabetic complications include no CVA, heart disease, nephropathy or peripheral neuropathy.  Risk factors for coronary artery disease include dyslipidemia, diabetes mellitus, obesity, family history and stress. Current diabetic treatment includes oral agent (triple therapy). She is compliant with treatment most of the time. Her weight is stable. She is following a generally healthy diet. She has not had a previous visit with a dietitian. She rarely participates in exercise. There is no change in her home blood glucose trend. Her breakfast blood glucose range is generally >200 mg/dl. An ACE inhibitor/angiotensin II receptor blocker is being taken. She does not see a podiatrist.Eye exam is current. Hyperlipidemia  This is a chronic problem. The current episode started more than 1 year ago. The problem is controlled. Recent lipid tests were reviewed and are normal. Exacerbating diseases include diabetes and obesity. She has no history of hypothyroidism. Factors aggravating her hyperlipidemia include fatty foods. Pertinent negatives include no chest pain, leg pain, myalgias or shortness of breath. Current antihyperlipidemic treatment includes statins. The current treatment provides moderate improvement of lipids. Compliance problems include adherence to diet. Risk factors for coronary artery disease include diabetes mellitus, dyslipidemia, family history, obesity, stress and a sedentary lifestyle. Vaginal Itching  The patient's primary symptoms include genital itching, a genital rash and vaginal discharge (ITCHING). The patient's pertinent negatives include no genital lesions, genital odor, missed menses, pelvic pain or vaginal bleeding. This is a recurrent problem. The current episode started more than 1 month ago. The problem occurs intermittently. The problem has been unchanged. The pain is moderate. The problem affects both sides. She is not pregnant. Pertinent negatives include no abdominal pain, chills, constipation, diarrhea, dysuria, fever, headaches, nausea, rash, sore throat or vomiting. There has been no bleeding.  She has not been passing clots. She has not been passing tissue. Nothing aggravates the symptoms. Treatments tried: OTC ANTIFUNGAL. The treatment provided no relief. Past Medical History:     Past Medical History:   Diagnosis Date    Anxiety 2007    Depression 1993    GERD (gastroesophageal reflux disease) 2010    H/O cardiovascular stress test 07/02/2018    Largerly normal myocardial perfusion imaging with soft tissue artifact but without evidence of signinfcant myocardial ischemia or infarction. EF 75%. The pt duke treadmill score is 6. H/O echocardiogram 07/02/2018    EF >70%. The LV wallt thickness is mildly incrased. H/O tilt table evaluation 05/24/2018    Borderline abnormal head upright tilt table study. Although the pt heart rate, blood pressure and symptoms were suggestive of orthostatic intolerance. Therefore, if clinically suspicion remains high, a trial of empiric trreatment and/or re-testing may be indicated. Hyperlipidemia 2010    Hypertension 2009    Kidney dysfunction     pt only has one kidney    Obesity 1999    Type 2 diabetes mellitus without complication ([facility] [address] 1818      Reviewed all health maintenance requirements and ordered appropriate tests  Health Maintenance Due   Topic Date Due    Colorectal Cancer Screen  Never done       Past Surgical History:     Past Surgical History:   Procedure Laterality Date    BLADDER SUSPENSION      CARPAL TUNNEL RELEASE      CHOLECYSTECTOMY      DILATION AND CURETTAGE OF UTERUS  2007    EYE SURGERY  2022    cataracts    INTRACAPSULAR CATARACT EXTRACTION Left 07/25/2022    EYE CATARACT EMULSIFICATION IOL IMPLANT performed by Richard Galvez DO at [facility] Right 10/24/2022    EYE CATARACT EMULSIFICATION IOL IMPLANT performed by Richard Galvez DO at [address] [place] [address]        Medications:       Prior to Admission medications    Medication Sig Start Date End Date Taking?  Authorizing Provider   fluconazole (DIFLUCAN) 150 MG tablet Take 1 tablet by mouth once for 1 dose 11/22/22 11/22/22 Yes RONN Parr CNP   omeprazole (PRILOSEC) 40 MG delayed release capsule Take 1 capsule by mouth every morning (before breakfast) 11/22/22  Yes RONN Shelton CNP   SITagliptin (JANUVIA) 100 MG tablet TAKE 1 TABLET BY MOUTH EVERY DAY 11/22/22  Yes RONN Shelton CNP   montelukast (SINGULAIR) 10 MG tablet TAKE 1 TABLET BY MOUTH EVERY DAY 11/22/22  Yes RONN Shelton CNP   metoprolol succinate (TOPROL XL) 25 MG extended release tablet TAKE 1/2 TABLET BY MOUTH ONCE DAILY 11/22/22  Yes RONN Shelton CNP   metFORMIN (GLUCOPHAGE) 1000 MG tablet TAKE 1 TABLET BY MOUTH TWICE A DAY WITH MEALS 11/22/22  Yes RONN Shelton CNP   lisinopril (PRINIVIL;ZESTRIL) 2.5 MG tablet TAKE 1 TABLET BY MOUTH EVERY DAY 11/22/22  Yes RONN Shelton CNP   aspirin ([facility] ASPIRIN ADULT LOW STRENGTH) 81 MG EC tablet TAKE 1 TABLET BY MOUTH EVERY DAY 11/22/22  Yes RONN Shelton CNP   glimepiride (AMARYL) 4 MG tablet Take 2 tablets by mouth every morning (before breakfast) 11/22/22  Yes RONN Shelton CNP   albuterol sulfate HFA (PROVENTIL;VENTOLIN;PROAIR) 108 (90 Base) MCG/ACT inhaler Inhale 2 puffs into the lungs 4 times daily as needed for Wheezing or Shortness of Breath 7/25/22  Yes RONN Shelton CNP   atorvastatin (LIPITOR) 40 MG tablet Take 1 tablet by mouth daily 5/25/22  Yes RONN Tpaia CNP   ibuprofen (ADVIL;MOTRIN) 200 MG tablet Take 800 mg by mouth every 6 hours as needed for Pain   Yes Historical Provider, MD   CRANBERRY-VITAMIN C PO Take by mouth daily    Yes Historical Provider, MD        Allergies:       Pcn [penicillins]    Social History:     Tobacco:    reports that she has been smoking cigarettes. She started smoking about 10 months ago. She has a 25.00 pack-year smoking history. She has never used smokeless tobacco.  Alcohol:      reports no history of alcohol use.   Drug Use: reports no history of drug use. Family History:     Family History   Problem Relation Age of Onset    High Cholesterol Mother     Other Brother     Alcohol Abuse Maternal Grandmother     Cancer Maternal Grandmother     Depression Maternal Grandmother     Diabetes Maternal Grandmother     Heart Disease Maternal Grandmother     High Blood Pressure Maternal Grandmother     High Cholesterol Maternal Grandmother     Mental Illness Maternal Grandmother     Obesity Maternal Grandmother     Other Maternal Grandfather        Review of Systems:     Positive and Negative as described in HPI    Review of Systems   Constitutional:  Negative for chills, fatigue, fever and weight loss. HENT:  Negative for congestion, rhinorrhea and sore throat. Eyes:  Negative for visual disturbance. Respiratory:  Negative for cough, shortness of breath and wheezing. Cardiovascular:  Negative for chest pain and palpitations. Gastrointestinal:  Negative for abdominal pain, constipation, diarrhea, nausea and vomiting. Endocrine: Negative for polydipsia, polyphagia and polyuria. Genitourinary:  Positive for vaginal discharge (ITCHING). Negative for difficulty urinating, dysuria, missed menses and pelvic pain. Musculoskeletal:  Negative for gait problem, myalgias, neck pain and neck stiffness. Skin:  Negative for pallor and rash. Neurological:  Negative for dizziness, syncope, speech difficulty, light-headedness and headaches. Psychiatric/Behavioral:  The patient is not nervous/anxious. Physical Exam:   Vitals:  /70 (Position: Sitting)   Pulse 68   Temp 97.3 °F (36.3 °C)   Resp 22   Wt 203 lb (92.1 kg)   SpO2 97%   BMI 37.13 kg/m²     Physical Exam  Constitutional:       General: She is not in acute distress. Appearance: Normal appearance. She is obese. She is not ill-appearing. HENT:      Mouth/Throat:      Mouth: Mucous membranes are moist.      Pharynx: Oropharynx is clear.    Eyes:      General: No scleral icterus. Conjunctiva/sclera: Conjunctivae normal.   Neck:      Vascular: No carotid bruit. Cardiovascular:      Rate and Rhythm: Normal rate and regular rhythm. Heart sounds: No murmur heard. Pulmonary:      Effort: Pulmonary effort is normal.      Breath sounds: Normal breath sounds. Abdominal:      General: Bowel sounds are normal. There is no distension. Palpations: Abdomen is soft. Tenderness: There is no abdominal tenderness. Musculoskeletal:      Cervical back: Normal range of motion and neck supple. Right lower leg: No edema. Left lower leg: No edema. Skin:     General: Skin is warm and dry. Findings: No rash. Neurological:      Mental Status: She is alert and oriented to person, place, and time.    Psychiatric:         Mood and Affect: Mood normal.         Behavior: Behavior normal.       Data:     Lab Results   Component Value Date/Time     11/21/2022 06:59 AM    K 4.1 11/21/2022 06:59 AM     11/21/2022 06:59 AM    CO2 23 11/21/2022 06:59 AM    BUN 19 11/21/2022 06:59 AM    CREATININE 0.66 11/21/2022 06:59 AM    GLUCOSE 213 11/21/2022 06:59 AM    PROT 7.0 02/24/2020 09:30 AM    LABALBU 3.9 02/24/2020 09:30 AM    BILITOT 0.25 02/24/2020 09:30 AM    ALKPHOS 77 02/24/2020 09:30 AM    AST <5 11/21/2022 06:59 AM    ALT <5 11/21/2022 06:59 AM     Lab Results   Component Value Date/Time    WBC 11.4 11/21/2022 06:59 AM    RBC 4.56 11/21/2022 06:59 AM    HGB 15.6 11/21/2022 06:59 AM    HCT 45.9 11/21/2022 06:59 AM    .7 11/21/2022 06:59 AM    MCH 34.2 11/21/2022 06:59 AM    MCHC 34.0 11/21/2022 06:59 AM    RDW 13.2 11/21/2022 06:59 AM     11/21/2022 06:59 AM    MPV 10.4 11/21/2022 06:59 AM     Lab Results   Component Value Date/Time    TSH 4.10 09/26/2019 10:40 PM     Lab Results   Component Value Date/Time    CHOL 279 11/21/2022 06:59 AM    HDL 20 11/21/2022 06:59 AM    LABA1C 9.1 11/21/2022 06:59 AM       Assessment/Plan:      Diagnosis Orders   1. Type 2 diabetes mellitus without complication, without long-term current use of insulin (HCC)  SITagliptin (JANUVIA) 100 MG tablet    metFORMIN (GLUCOPHAGE) 1000 MG tablet    lisinopril (PRINIVIL;ZESTRIL) 2.5 MG tablet    aspirin ([employer] ASPIRIN ADULT LOW STRENGTH) 81 MG EC tablet    glimepiride (AMARYL) 4 MG tablet      2. Mixed hyperlipidemia  Lipid Panel      3. Vaginal yeast infection          We will increase glimepiride to 8 mg daily. Continue all other medications except for Farxiga. Diflucan for yeast infection. Repeat lipids in 1 month. We will see her back in 3 months with A1c check, sooner if any issues. 1.  Vera Cao received counseling on the following healthy behaviors: nutrition, exercise, medication adherence, and tobacco cessation  2. Patient given educational materials - see patient instructions  3. Was a self-tracking handout given in paper form or via [employer]? No  If yes, see orders or list here. 4.  Discussed use, benefit, and side effects of prescribed medications. Barriers to medication compliance addressed. All patient questions answered. Pt voiced understanding. 5.  Reviewed prior labs and health maintenance  6. Continue current medications, diet and exercise.     Completed Refills   Requested Prescriptions     Signed Prescriptions Disp Refills    fluconazole (DIFLUCAN) 150 MG tablet 1 tablet 0     Sig: Take 1 tablet by mouth once for 1 dose    omeprazole (PRILOSEC) 40 MG delayed release capsule 90 capsule 1     Sig: Take 1 capsule by mouth every morning (before breakfast)    SITagliptin (JANUVIA) 100 MG tablet 90 tablet 1     Sig: TAKE 1 TABLET BY MOUTH EVERY DAY    montelukast (SINGULAIR) 10 MG tablet 90 tablet 1     Sig: TAKE 1 TABLET BY MOUTH EVERY DAY    metoprolol succinate (TOPROL XL) 25 MG extended release tablet 45 tablet 1     Sig: TAKE 1/2 TABLET BY MOUTH ONCE DAILY    metFORMIN (GLUCOPHAGE) 1000 MG tablet 180 tablet 1     Sig: TAKE 1 TABLET BY MOUTH TWICE A DAY WITH MEALS    lisinopril (PRINIVIL;ZESTRIL) 2.5 MG tablet 90 tablet 1     Sig: TAKE 1 TABLET BY MOUTH EVERY DAY    aspirin ([employer] ASPIRIN ADULT LOW STRENGTH) 81 MG EC tablet 100 tablet 1     Sig: TAKE 1 TABLET BY MOUTH EVERY DAY    glimepiride (AMARYL) 4 MG tablet 180 tablet 3     Sig: Take 2 tablets by mouth every morning (before breakfast)         Return in about 3 months (around 2/22/2023) for Check up- A1c in office.

## 2022-11-30 ENCOUNTER — OFFICE VISIT (OUTPATIENT)
Dept: PRIMARY CARE CLINIC | Age: 47
End: 2022-11-30
Payer: COMMERCIAL

## 2022-11-30 VITALS
DIASTOLIC BLOOD PRESSURE: 74 MMHG | BODY MASS INDEX: 37.53 KG/M2 | TEMPERATURE: 98.1 F | RESPIRATION RATE: 18 BRPM | OXYGEN SATURATION: 97 % | HEART RATE: 88 BPM | WEIGHT: 205.2 LBS | SYSTOLIC BLOOD PRESSURE: 112 MMHG

## 2022-11-30 DIAGNOSIS — J06.9 UPPER RESPIRATORY TRACT INFECTION, UNSPECIFIED TYPE: ICD-10-CM

## 2022-11-30 DIAGNOSIS — R50.9 FEVER, UNSPECIFIED FEVER CAUSE: ICD-10-CM

## 2022-11-30 DIAGNOSIS — R52 BODY ACHES: ICD-10-CM

## 2022-11-30 DIAGNOSIS — H66.002 NON-RECURRENT ACUTE SUPPURATIVE OTITIS MEDIA OF LEFT EAR WITHOUT SPONTANEOUS RUPTURE OF TYMPANIC MEMBRANE: Primary | ICD-10-CM

## 2022-11-30 LAB
INFLUENZA A ANTIBODY: NEGATIVE
INFLUENZA B ANTIBODY: NEGATIVE

## 2022-11-30 PROCEDURE — 87804 INFLUENZA ASSAY W/OPTIC: CPT | Performed by: NURSE PRACTITIONER

## 2022-11-30 PROCEDURE — 99214 OFFICE O/P EST MOD 30 MIN: CPT | Performed by: NURSE PRACTITIONER

## 2022-11-30 RX ORDER — DEXTROMETHORPHAN HYDROBROMIDE, GUAIFENESIN AND PSEUDOEPHEDRINE HYDROCHLORIDE 15; 400; 60 MG/1; MG/1; MG/1
1 TABLET ORAL EVERY 6 HOURS PRN
Qty: 28 TABLET | Refills: 0 | Status: SHIPPED | OUTPATIENT
Start: 2022-11-30 | End: 2022-11-30 | Stop reason: SDUPTHER

## 2022-11-30 RX ORDER — CEFDINIR 300 MG/1
300 CAPSULE ORAL 2 TIMES DAILY
Qty: 20 CAPSULE | Refills: 0 | Status: SHIPPED | OUTPATIENT
Start: 2022-11-30 | End: 2022-12-10

## 2022-11-30 RX ORDER — DEXTROMETHORPHAN HYDROBROMIDE, GUAIFENESIN AND PSEUDOEPHEDRINE HYDROCHLORIDE 15; 400; 60 MG/1; MG/1; MG/1
1 TABLET ORAL EVERY 6 HOURS PRN
Qty: 28 TABLET | Refills: 0 | Status: SHIPPED | OUTPATIENT
Start: 2022-11-30 | End: 2022-12-07

## 2022-11-30 ASSESSMENT — PATIENT HEALTH QUESTIONNAIRE - PHQ9
SUM OF ALL RESPONSES TO PHQ QUESTIONS 1-9: 0
SUM OF ALL RESPONSES TO PHQ QUESTIONS 1-9: 0
SUM OF ALL RESPONSES TO PHQ9 QUESTIONS 1 & 2: 0
SUM OF ALL RESPONSES TO PHQ QUESTIONS 1-9: 0
2. FEELING DOWN, DEPRESSED OR HOPELESS: 0
1. LITTLE INTEREST OR PLEASURE IN DOING THINGS: 0
SUM OF ALL RESPONSES TO PHQ QUESTIONS 1-9: 0

## 2022-11-30 ASSESSMENT — ENCOUNTER SYMPTOMS
SORE THROAT: 1
RHINORRHEA: 1
ALLERGIC/IMMUNOLOGIC NEGATIVE: 1
EYES NEGATIVE: 1
SINUS PRESSURE: 1
COUGH: 1

## 2022-11-30 NOTE — PROGRESS NOTES
[facility] PHYSICIANS  [facility], [address] [facility] PRIMARY CARE  [address] [facility] [address] [place] [address]  Dept: [phone]  Dept Fax: [phone]      Name: Jael Yousif  : 1975         Chief Complaint:     Chief Complaint   Patient presents with    Cough     X 1 week. Pharyngitis     X 1 week. Congestion     X 1 week. Generalized Body Aches     X 1 week. Fever     X 1 week. History of Present Illness:      Jael Yousif is a 41 y.o.  female who presents with Cough (X 1 week. ), Pharyngitis (X 1 week. ), Congestion (X 1 week. ), Generalized Body Aches (X 1 week.), and Fever (X 1 week. )    Joel Damian is here today for a sick visit. She has not been feeling well for 8 days and states she feels worse today. She has a productive cough. She has body aches and fatigue. She has sore throat and headache. She has congestion that is constantly draining. She had a fever of 101.1 at home yesterday and had chills and sweats. She has bilateral ear fullness. She has taken Theraflu and Ibuprofen. She was exposed to Influenza yesterday at work. Last Thursday had a negative covid test.    Past Medical History:     Past Medical History:   Diagnosis Date    Anxiety     Depression     GERD (gastroesophageal reflux disease)     H/O cardiovascular stress test 2018    Largerly normal myocardial perfusion imaging with soft tissue artifact but without evidence of signinfcant myocardial ischemia or infarction. EF 75%. The pt duke treadmill score is 6. H/O echocardiogram 2018    EF >70%. The LV wallt thickness is mildly incrased. H/O tilt table evaluation 2018    Borderline abnormal head upright tilt table study. Although the pt heart rate, blood pressure and symptoms were suggestive of orthostatic intolerance. Therefore, if clinically suspicion remains high, a trial of empiric trreatment and/or re-testing may be indicated. Hyperlipidemia 2010    Hypertension 2009    Kidney dysfunction     pt only has one kidney    Obesity 1999    Type 2 diabetes mellitus without complication ([facility] [address] 6136      Reviewed all health maintenance requirements and ordered appropriate tests  Health Maintenance Due   Topic Date Due    Colorectal Cancer Screen  Never done       Past Surgical History:     Past Surgical History:   Procedure Laterality Date    BLADDER SUSPENSION      CARPAL TUNNEL RELEASE      CHOLECYSTECTOMY      DILATION AND CURETTAGE OF UTERUS  2007    EYE SURGERY  2022    cataracts    INTRACAPSULAR CATARACT EXTRACTION Left 07/25/2022    EYE CATARACT EMULSIFICATION IOL IMPLANT performed by Dino Garcia DO at [address] [facility] Right 10/24/2022    EYE CATARACT EMULSIFICATION IOL IMPLANT performed by Dino Garcia DO at [address] [facility]        Medications:       Prior to Admission medications    Medication Sig Start Date End Date Taking?  Authorizing Provider   cefdinir (OMNICEF) 300 MG capsule Take 1 capsule by mouth 2 times daily for 10 days 11/30/22 12/10/22 Yes Ashanti Phlegm, RONN - CNP   omeprazole (PRILOSEC) 40 MG delayed release capsule Take 1 capsule by mouth every morning (before breakfast) 11/22/22  Yes RONN Meneses CNP   SITagliptin (JANUVIA) 100 MG tablet TAKE 1 TABLET BY MOUTH EVERY DAY 11/22/22  Yes RONN Meneses CNP   montelukast (SINGULAIR) 10 MG tablet TAKE 1 TABLET BY MOUTH EVERY DAY 11/22/22  Yes RONN Meneses - CNP   metoprolol succinate (TOPROL XL) 25 MG extended release tablet TAKE 1/2 TABLET BY MOUTH ONCE DAILY 11/22/22  Yes RONN Meneses CNP   metFORMIN (GLUCOPHAGE) 1000 MG tablet TAKE 1 TABLET BY MOUTH TWICE A DAY WITH MEALS 11/22/22  Yes Chris Lindsay APRN - CNP   lisinopril (PRINIVIL;ZESTRIL) 2.5 MG tablet TAKE 1 TABLET BY MOUTH EVERY DAY 11/22/22  Yes RONN Meneses - CNP   aspirin ([employer] ASPIRIN ADULT LOW STRENGTH) 81 MG EC tablet TAKE 1 TABLET BY MOUTH EVERY DAY 11/22/22  Yes RONN Mullen CNP   glimepiride (AMARYL) 4 MG tablet Take 2 tablets by mouth every morning (before breakfast) 11/22/22  Yes RONN Mullen CNP   albuterol sulfate HFA (PROVENTIL;VENTOLIN;PROAIR) 108 (90 Base) MCG/ACT inhaler Inhale 2 puffs into the lungs 4 times daily as needed for Wheezing or Shortness of Breath 7/25/22  Yes RONN Mullen CNP   atorvastatin (LIPITOR) 40 MG tablet Take 1 tablet by mouth daily 5/25/22  Yes Sydell Mcardle, APRN - CNP   ibuprofen (ADVIL;MOTRIN) 200 MG tablet Take 800 mg by mouth every 6 hours as needed for Pain   Yes Historical Provider, MD   CRANBERRY-VITAMIN C PO Take by mouth daily    Yes Historical Provider, MD   Pseudoephedrine-DM-GG (CAPMIST DM) 60- MG TABS Take 1 tablet by mouth every 6 hours as needed (Sinus pressure) 11/30/22 12/7/22  Sydell Mcardle, APRN - CNP        Allergies:       Pcn [penicillins]    Social History:     Tobacco:    reports that she has been smoking cigarettes. She started smoking about 10 months ago. She has a 25.00 pack-year smoking history. She has never used smokeless tobacco.  Alcohol:      reports no history of alcohol use. Drug Use:  reports no history of drug use. Family History:     Family History   Problem Relation Age of Onset    High Cholesterol Mother     Other Brother     Alcohol Abuse Maternal Grandmother     Cancer Maternal Grandmother     Depression Maternal Grandmother     Diabetes Maternal Grandmother     Heart Disease Maternal Grandmother     High Blood Pressure Maternal Grandmother     High Cholesterol Maternal Grandmother     Mental Illness Maternal Grandmother     Obesity Maternal Grandmother     Other Maternal Grandfather        Review of Systems:     Positive and Negative as described in HPI    Review of Systems   Constitutional:  Positive for chills, diaphoresis, fatigue and fever.    HENT:  Positive for congestion, ear pain, rhinorrhea, sinus pressure and sore throat. Eyes: Negative. Respiratory:  Positive for cough. Endocrine: Negative. Genitourinary: Negative. Musculoskeletal:  Positive for arthralgias. Allergic/Immunologic: Negative. Neurological:  Positive for headaches. Hematological: Negative. Psychiatric/Behavioral: Negative. Physical Exam:   Vitals:  /74   Pulse 88   Temp 98.1 °F (36.7 °C) (Temporal)   Resp 18   Wt 205 lb 3.2 oz (93.1 kg)   SpO2 97%   BMI 37.53 kg/m²     Physical Exam  Vitals and nursing note reviewed. Constitutional:       Appearance: Normal appearance. She is ill-appearing. HENT:      Head: Normocephalic. Right Ear: Tympanic membrane is erythematous. Left Ear: Tympanic membrane is injected and erythematous. Nose: Mucosal edema, congestion and rhinorrhea present. Rhinorrhea is clear. Right Sinus: Frontal sinus tenderness present. Left Sinus: Frontal sinus tenderness present. Mouth/Throat:      Lips: Pink. Mouth: Mucous membranes are moist.      Pharynx: Posterior oropharyngeal erythema present. Tonsils: No tonsillar exudate. Eyes:      Pupils: Pupils are equal, round, and reactive to light. Cardiovascular:      Rate and Rhythm: Normal rate and regular rhythm. Heart sounds: Normal heart sounds. Pulmonary:      Effort: Pulmonary effort is normal.      Breath sounds: Normal breath sounds. Musculoskeletal:         General: Normal range of motion. Cervical back: Normal range of motion and neck supple. Lymphadenopathy:      Cervical: Cervical adenopathy (anterior) present. Skin:     General: Skin is warm. Capillary Refill: Capillary refill takes less than 2 seconds. Neurological:      General: No focal deficit present. Mental Status: She is alert and oriented to person, place, and time. Psychiatric:         Mood and Affect: Mood normal.         Behavior: Behavior normal.         Thought Content:  Thought content normal. Judgment: Judgment normal.       Data:     Lab Results   Component Value Date/Time     11/21/2022 06:59 AM    K 4.1 11/21/2022 06:59 AM     11/21/2022 06:59 AM    CO2 23 11/21/2022 06:59 AM    BUN 19 11/21/2022 06:59 AM    CREATININE 0.66 11/21/2022 06:59 AM    GLUCOSE 213 11/21/2022 06:59 AM    PROT 7.0 02/24/2020 09:30 AM    LABALBU 3.9 02/24/2020 09:30 AM    BILITOT 0.25 02/24/2020 09:30 AM    ALKPHOS 77 02/24/2020 09:30 AM    AST <5 11/21/2022 06:59 AM    ALT <5 11/21/2022 06:59 AM     Lab Results   Component Value Date/Time    WBC 11.4 11/21/2022 06:59 AM    RBC 4.56 11/21/2022 06:59 AM    HGB 15.6 11/21/2022 06:59 AM    HCT 45.9 11/21/2022 06:59 AM    .7 11/21/2022 06:59 AM    MCH 34.2 11/21/2022 06:59 AM    MCHC 34.0 11/21/2022 06:59 AM    RDW 13.2 11/21/2022 06:59 AM     11/21/2022 06:59 AM    MPV 10.4 11/21/2022 06:59 AM     Lab Results   Component Value Date/Time    TSH 4.10 09/26/2019 10:40 PM     Lab Results   Component Value Date/Time    CHOL 279 11/21/2022 06:59 AM    HDL 20 11/21/2022 06:59 AM    LABA1C 9.1 11/21/2022 06:59 AM       Assessment/Plan:      Diagnosis Orders   1. Non-recurrent acute suppurative otitis media of left ear without spontaneous rupture of tympanic membrane  cefdinir (OMNICEF) 300 MG capsule      2. Upper respiratory tract infection, unspecified type  DISCONTINUED: Pseudoephedrine-DM-GG (CAPMIST DM) 60- MG TABS      3. Body aches  POCT Influenza A/B      4. Fever, unspecified fever cause  POCT Influenza A/B        Practice meticulous handwashing and cover cough to prevent spread of infection  Encouraged to increase fluids and rest  Tylenol/Ibuprofen OTC PRN for pain, discomfort or fever as directed on package  Warm salt water gargles for sore throat  Cool mist humidifier  Hot tea with honey and lemon for cough and sore throat PRN  Start Omnicef as directed. Capmist as prescribed.     1.  Elida received counseling on the following healthy behaviors: nutrition, exercise, and medication adherence  2. Patient given educational materials - see patient instructions  3. Was a self-tracking handout given in paper form or via [employer]? No  If yes, see orders or list here. 4.  Discussed use, benefit, and side effects of prescribed medications. Barriers to medication compliance addressed. All patient questions answered. Pt voiced understanding. 5.  Reviewed prior labs and health maintenance  6. Continue current medications, diet and exercise. Completed Refills   Requested Prescriptions     Signed Prescriptions Disp Refills    cefdinir (OMNICEF) 300 MG capsule 20 capsule 0     Sig: Take 1 capsule by mouth 2 times daily for 10 days         No follow-ups on file.

## 2022-11-30 NOTE — PATIENT INSTRUCTIONS
SURVEY:     You may be receiving a survey from [employer] regarding your visit today. Please complete the survey to enable us to provide the highest quality of care to you and your family. If you cannot score us a very good on any question, please call the office to discuss how we could have made your experience a very good one.      Thank you,    Andreas Lindsay, APRN-CNP  Shaylee Perez, APRN-CNP  Salvador Jeff, ROSALINE Peoples, GABI Rubin, GABI Mauricio, CMA  Ankita, PCA  Tamiko, PM

## 2022-11-30 NOTE — LETTER
[facility] Primary Care [place]  [address] [facility]  [place] [address] [facility]  Phone: [phone]  Fax: RONN Pulido CNP        November 30, 2022     Patient: Francia Ruiz   YOB: 1975   Date of Visit: 11/30/2022       To Whom it May Concern:    Francia Ruiz was seen in my clinic on 11/30/2022. She may return to work on 12/5/2022. If you have any questions or concerns, please don't hesitate to call.     Sincerely,         RONN Viera CNP

## 2022-12-19 ENCOUNTER — HOSPITAL ENCOUNTER (OUTPATIENT)
Age: 47
Discharge: HOME | End: 2022-12-19
Payer: COMMERCIAL

## 2022-12-19 DIAGNOSIS — E78.2 MIXED HYPERLIPIDEMIA: ICD-10-CM

## 2022-12-19 LAB
CHOLESTEROL/HDL RATIO: 9.1
CHOLESTEROL: 218 MG/DL
HDLC SERPL-MCNC: 24 MG/DL
LDL CHOLESTEROL DIRECT: 83 MG/DL
LDL CHOLESTEROL: ABNORMAL MG/DL (ref 0–130)
TRIGL SERPL-MCNC: 1058 MG/DL

## 2022-12-19 PROCEDURE — 83721 ASSAY OF BLOOD LIPOPROTEIN: CPT

## 2022-12-19 PROCEDURE — 36415 COLL VENOUS BLD VENIPUNCTURE: CPT

## 2022-12-19 PROCEDURE — 80061 LIPID PANEL: CPT

## 2022-12-20 ENCOUNTER — TELEPHONE (OUTPATIENT)
Dept: PRIMARY CARE CLINIC | Age: 47
End: 2022-12-20

## 2022-12-20 NOTE — TELEPHONE ENCOUNTER
----- Message from [address] [place] [address], RONN - CNP sent at 12/20/2022  7:52 AM EST -----  Triglycerides still remain very high. Would recommend eliminating most sugar and fats from the diet. Also recommend starting fish oil supplement.

## 2023-01-29 ENCOUNTER — HOSPITAL ENCOUNTER (OUTPATIENT)
Age: 48
Discharge: HOME | End: 2023-01-29

## 2023-03-20 ENCOUNTER — OFFICE VISIT (OUTPATIENT)
Dept: PRIMARY CARE CLINIC | Age: 48
End: 2023-03-20
Payer: COMMERCIAL

## 2023-03-20 VITALS
BODY MASS INDEX: 38.43 KG/M2 | OXYGEN SATURATION: 97 % | DIASTOLIC BLOOD PRESSURE: 74 MMHG | SYSTOLIC BLOOD PRESSURE: 128 MMHG | TEMPERATURE: 97.7 F | WEIGHT: 210.1 LBS | RESPIRATION RATE: 20 BRPM | HEART RATE: 78 BPM

## 2023-03-20 DIAGNOSIS — E78.2 MIXED HYPERLIPIDEMIA: ICD-10-CM

## 2023-03-20 DIAGNOSIS — E11.65 UNCONTROLLED TYPE 2 DIABETES MELLITUS WITH HYPERGLYCEMIA (HCC): Primary | ICD-10-CM

## 2023-03-20 DIAGNOSIS — Z12.31 OTHER SCREENING MAMMOGRAM: ICD-10-CM

## 2023-03-20 DIAGNOSIS — Z12.11 COLON CANCER SCREENING: ICD-10-CM

## 2023-03-20 PROBLEM — E11.9 TYPE 2 DIABETES MELLITUS WITHOUT COMPLICATION, WITHOUT LONG-TERM CURRENT USE OF INSULIN (HCC): Status: RESOLVED | Noted: 2018-02-15 | Resolved: 2023-03-20

## 2023-03-20 LAB — HBA1C MFR BLD: 9.8 %

## 2023-03-20 PROCEDURE — 83036 HEMOGLOBIN GLYCOSYLATED A1C: CPT | Performed by: NURSE PRACTITIONER

## 2023-03-20 PROCEDURE — 99214 OFFICE O/P EST MOD 30 MIN: CPT | Performed by: NURSE PRACTITIONER

## 2023-03-20 PROCEDURE — 3046F HEMOGLOBIN A1C LEVEL >9.0%: CPT | Performed by: NURSE PRACTITIONER

## 2023-03-20 RX ORDER — BLOOD-GLUCOSE SENSOR
1 EACH MISCELLANEOUS
Qty: 6 EACH | Refills: 0 | Status: SHIPPED | OUTPATIENT
Start: 2023-03-20

## 2023-03-20 RX ORDER — PEN NEEDLE, DIABETIC 31 G X1/4"
1 NEEDLE, DISPOSABLE MISCELLANEOUS DAILY
Qty: 100 EACH | Refills: 3 | Status: SHIPPED | OUTPATIENT
Start: 2023-03-20

## 2023-03-20 RX ORDER — GLIMEPIRIDE 4 MG/1
4 TABLET ORAL
Qty: 180 TABLET | Refills: 3 | Status: SHIPPED
Start: 2023-03-20

## 2023-03-20 SDOH — ECONOMIC STABILITY: FOOD INSECURITY: WITHIN THE PAST 12 MONTHS, THE FOOD YOU BOUGHT JUST DIDN'T LAST AND YOU DIDN'T HAVE MONEY TO GET MORE.: PATIENT DECLINED

## 2023-03-20 SDOH — ECONOMIC STABILITY: INCOME INSECURITY: HOW HARD IS IT FOR YOU TO PAY FOR THE VERY BASICS LIKE FOOD, HOUSING, MEDICAL CARE, AND HEATING?: PATIENT DECLINED

## 2023-03-20 SDOH — ECONOMIC STABILITY: FOOD INSECURITY: WITHIN THE PAST 12 MONTHS, YOU WORRIED THAT YOUR FOOD WOULD RUN OUT BEFORE YOU GOT MONEY TO BUY MORE.: PATIENT DECLINED

## 2023-03-20 SDOH — ECONOMIC STABILITY: HOUSING INSECURITY
IN THE LAST 12 MONTHS, WAS THERE A TIME WHEN YOU DID NOT HAVE A STEADY PLACE TO SLEEP OR SLEPT IN A SHELTER (INCLUDING NOW)?: PATIENT REFUSED

## 2023-03-20 ASSESSMENT — ENCOUNTER SYMPTOMS
WHEEZING: 0
VOMITING: 0
ABDOMINAL PAIN: 0
RHINORRHEA: 0
VISUAL CHANGE: 0
NAUSEA: 0
SHORTNESS OF BREATH: 0
DIARRHEA: 0
CONSTIPATION: 0
COUGH: 0
SORE THROAT: 0

## 2023-03-20 ASSESSMENT — PATIENT HEALTH QUESTIONNAIRE - PHQ9
SUM OF ALL RESPONSES TO PHQ QUESTIONS 1-9: 0
SUM OF ALL RESPONSES TO PHQ QUESTIONS 1-9: 0
SUM OF ALL RESPONSES TO PHQ9 QUESTIONS 1 & 2: 0
2. FEELING DOWN, DEPRESSED OR HOPELESS: 0
SUM OF ALL RESPONSES TO PHQ QUESTIONS 1-9: 0
1. LITTLE INTEREST OR PLEASURE IN DOING THINGS: 0
SUM OF ALL RESPONSES TO PHQ QUESTIONS 1-9: 0

## 2023-03-20 NOTE — PROGRESS NOTES
Date/Time     11/21/2022 06:59 AM    K 4.1 11/21/2022 06:59 AM     11/21/2022 06:59 AM    CO2 23 11/21/2022 06:59 AM    BUN 19 11/21/2022 06:59 AM    CREATININE 0.66 11/21/2022 06:59 AM    GLUCOSE 213 11/21/2022 06:59 AM    PROT 7.0 02/24/2020 09:30 AM    LABALBU 3.9 02/24/2020 09:30 AM    BILITOT 0.25 02/24/2020 09:30 AM    ALKPHOS 77 02/24/2020 09:30 AM    AST <5 11/21/2022 06:59 AM    ALT <5 11/21/2022 06:59 AM     Lab Results   Component Value Date/Time    WBC 11.4 11/21/2022 06:59 AM    RBC 4.56 11/21/2022 06:59 AM    HGB 15.6 11/21/2022 06:59 AM    HCT 45.9 11/21/2022 06:59 AM    .7 11/21/2022 06:59 AM    MCH 34.2 11/21/2022 06:59 AM    MCHC 34.0 11/21/2022 06:59 AM    RDW 13.2 11/21/2022 06:59 AM     11/21/2022 06:59 AM    MPV 10.4 11/21/2022 06:59 AM     Lab Results   Component Value Date/Time    TSH 4.10 09/26/2019 10:40 PM     Lab Results   Component Value Date/Time    CHOL 218 12/19/2022 07:57 AM    HDL 24 12/19/2022 07:57 AM    LABA1C 9.8 03/20/2023 03:30 PM       Assessment/Plan:      Diagnosis Orders   1. Uncontrolled type 2 diabetes mellitus with hyperglycemia (HCC)  POCT glycosylated hemoglobin (Hb A1C)    Insulin Degludec 200 UNIT/ML SOPN    Insulin Pen Needle ([employer] PEN NEEDLES) 31G X 6 MM MISC    Continuous Blood Gluc Sensor (FREESTYLE KEVIN 3 SENSOR) MISC    External Referral To Endocrinology    glimepiride (AMARYL) 4 MG tablet      2. Mixed hyperlipidemia        3. Colon cancer screening  Fecal DNA Colorectal cancer screening (Cologuard)      4. Other screening mammogram  SARA BREANNE DIGITAL SCREEN BILATERAL        We will continue all medications and decrease glimepiride to 4 mg daily. Start Tresiba 20 units daily. She will come to the office for instruction. Referral to endocrinology. We will see her back in 3 months for routine check, sooner if any issues.       1.  Elida received counseling on the following healthy behaviors: nutrition, exercise, and

## 2023-03-20 NOTE — PATIENT INSTRUCTIONS
SURVEY:     You may be receiving a survey from [employer] regarding your visit today. Please complete the survey to enable us to provide the highest quality of care to you and your family. If you cannot score us a very good on any question, please call the office to discuss how we could have made your experience a very good one.      Thank you,    Rosalva Lindsay, APRN-CNP  Meliza Pearl, APRN-CNP  ROSALINE Delacruz, CMA  Rommel Werner, CMA  Luly, CMA  Ankita, PCA  Tamiko, PM

## 2023-04-02 ENCOUNTER — APPOINTMENT (OUTPATIENT)
Dept: CT IMAGING | Age: 48
DRG: 690 | End: 2023-04-02
Payer: COMMERCIAL

## 2023-04-02 ENCOUNTER — HOSPITAL ENCOUNTER (INPATIENT)
Age: 48
LOS: 1 days | Discharge: HOME | DRG: 690 | End: 2023-04-03
Attending: EMERGENCY MEDICINE | Admitting: STUDENT IN AN ORGANIZED HEALTH CARE EDUCATION/TRAINING PROGRAM
Payer: COMMERCIAL

## 2023-04-02 DIAGNOSIS — R10.9 FLANK PAIN: Primary | ICD-10-CM

## 2023-04-02 DIAGNOSIS — N13.1 HYDRONEPHROSIS WITH URETERAL STRICTURE, NOT ELSEWHERE CLASSIFIED: ICD-10-CM

## 2023-04-02 DIAGNOSIS — D72.829 LEUKOCYTOSIS, UNSPECIFIED TYPE: ICD-10-CM

## 2023-04-02 DIAGNOSIS — N30.01 ACUTE CYSTITIS WITH HEMATURIA: ICD-10-CM

## 2023-04-02 PROBLEM — N39.0 UTI (URINARY TRACT INFECTION): Status: ACTIVE | Noted: 2023-04-02

## 2023-04-02 LAB
ABSOLUTE EOS #: 0.16 K/UL (ref 0–0.44)
ABSOLUTE IMMATURE GRANULOCYTE: 0.1 K/UL (ref 0–0.3)
ABSOLUTE LYMPH #: 3.6 K/UL (ref 1.1–3.7)
ABSOLUTE MONO #: 0.93 K/UL (ref 0.1–1.2)
ALBUMIN SERPL-MCNC: 4.6 G/DL (ref 3.5–5.2)
ALBUMIN/GLOBULIN RATIO: 1.5 (ref 1–2.5)
ALP SERPL-CCNC: 75 U/L (ref 35–104)
ALT SERPL-CCNC: 35 U/L (ref 5–33)
ANION GAP SERPL CALCULATED.3IONS-SCNC: 11 MMOL/L (ref 9–17)
AST SERPL-CCNC: 22 U/L
BACTERIA: ABNORMAL
BASOPHILS # BLD: 0 % (ref 0–2)
BASOPHILS ABSOLUTE: 0.05 K/UL (ref 0–0.2)
BILIRUB SERPL-MCNC: 0.4 MG/DL (ref 0.3–1.2)
BILIRUBIN URINE: NEGATIVE
BUN SERPL-MCNC: 12 MG/DL (ref 6–20)
BUN/CREAT BLD: 18 (ref 9–20)
CALCIUM SERPL-MCNC: 9.6 MG/DL (ref 8.6–10.4)
CHLORIDE SERPL-SCNC: 100 MMOL/L (ref 98–107)
CO2 SERPL-SCNC: 27 MMOL/L (ref 20–31)
COLOR: YELLOW
CREAT SERPL-MCNC: 0.68 MG/DL (ref 0.5–0.9)
EOSINOPHILS RELATIVE PERCENT: 1 % (ref 1–4)
EPITHELIAL CELLS UA: ABNORMAL /HPF (ref 0–25)
GFR SERPL CREATININE-BSD FRML MDRD: >60 ML/MIN/1.73M2
GLUCOSE BLD-MCNC: 245 MG/DL (ref 74–100)
GLUCOSE SERPL-MCNC: 166 MG/DL (ref 70–99)
GLUCOSE UR STRIP.AUTO-MCNC: NEGATIVE MG/DL
HCT VFR BLD AUTO: 45.3 % (ref 36.3–47.1)
HGB BLD-MCNC: 15.7 G/DL (ref 11.9–15.1)
IMMATURE GRANULOCYTES: 1 %
KETONES UR STRIP.AUTO-MCNC: NEGATIVE MG/DL
LEUKOCYTE ESTERASE UR QL STRIP.AUTO: ABNORMAL
LYMPHOCYTES # BLD: 23 % (ref 24–43)
MCH RBC QN AUTO: 33.5 PG (ref 25.2–33.5)
MCHC RBC AUTO-ENTMCNC: 34.7 G/DL (ref 28.4–34.8)
MCV RBC AUTO: 96.6 FL (ref 82.6–102.9)
MONOCYTES # BLD: 6 % (ref 3–12)
NITRITE UR QL STRIP.AUTO: POSITIVE
NRBC AUTOMATED: 0 PER 100 WBC
PDW BLD-RTO: 12.5 % (ref 11.8–14.4)
PLATELET # BLD AUTO: 288 K/UL (ref 138–453)
PMV BLD AUTO: 9.7 FL (ref 8.1–13.5)
POTASSIUM SERPL-SCNC: 4 MMOL/L (ref 3.7–5.3)
PROT SERPL-MCNC: 7.6 G/DL (ref 6.4–8.3)
PROT UR STRIP.AUTO-MCNC: 7 MG/DL (ref 5–9)
PROT UR STRIP.AUTO-MCNC: ABNORMAL MG/DL
RBC # BLD: 4.69 M/UL (ref 3.95–5.11)
RBC CLUMPS #/AREA URNS AUTO: ABNORMAL /HPF (ref 0–2)
SEG NEUTROPHILS: 69 % (ref 36–65)
SEGMENTED NEUTROPHILS ABSOLUTE COUNT: 11.18 K/UL (ref 1.5–8.1)
SODIUM SERPL-SCNC: 138 MMOL/L (ref 135–144)
SPECIFIC GRAVITY UA: 1.02 (ref 1.01–1.02)
TURBIDITY: ABNORMAL
URINE HGB: ABNORMAL
UROBILINOGEN, URINE: NORMAL
WBC # BLD AUTO: 16 K/UL (ref 3.5–11.3)
WBC UA: ABNORMAL /HPF (ref 0–5)

## 2023-04-02 PROCEDURE — 36415 COLL VENOUS BLD VENIPUNCTURE: CPT

## 2023-04-02 PROCEDURE — 99285 EMERGENCY DEPT VISIT HI MDM: CPT

## 2023-04-02 PROCEDURE — 80053 COMPREHEN METABOLIC PANEL: CPT

## 2023-04-02 PROCEDURE — 1200000000 HC SEMI PRIVATE

## 2023-04-02 PROCEDURE — 94761 N-INVAS EAR/PLS OXIMETRY MLT: CPT

## 2023-04-02 PROCEDURE — 87088 URINE BACTERIA CULTURE: CPT

## 2023-04-02 PROCEDURE — 2580000003 HC RX 258: Performed by: STUDENT IN AN ORGANIZED HEALTH CARE EDUCATION/TRAINING PROGRAM

## 2023-04-02 PROCEDURE — 85025 COMPLETE CBC W/AUTO DIFF WBC: CPT

## 2023-04-02 PROCEDURE — 96365 THER/PROPH/DIAG IV INF INIT: CPT

## 2023-04-02 PROCEDURE — 81001 URINALYSIS AUTO W/SCOPE: CPT

## 2023-04-02 PROCEDURE — 6360000002 HC RX W HCPCS: Performed by: STUDENT IN AN ORGANIZED HEALTH CARE EDUCATION/TRAINING PROGRAM

## 2023-04-02 PROCEDURE — 87186 SC STD MICRODIL/AGAR DIL: CPT

## 2023-04-02 PROCEDURE — 87086 URINE CULTURE/COLONY COUNT: CPT

## 2023-04-02 PROCEDURE — 6370000000 HC RX 637 (ALT 250 FOR IP): Performed by: STUDENT IN AN ORGANIZED HEALTH CARE EDUCATION/TRAINING PROGRAM

## 2023-04-02 PROCEDURE — 2580000003 HC RX 258: Performed by: EMERGENCY MEDICINE

## 2023-04-02 PROCEDURE — 6360000002 HC RX W HCPCS: Performed by: EMERGENCY MEDICINE

## 2023-04-02 PROCEDURE — 74176 CT ABD & PELVIS W/O CONTRAST: CPT

## 2023-04-02 PROCEDURE — 96375 TX/PRO/DX INJ NEW DRUG ADDON: CPT

## 2023-04-02 PROCEDURE — 82947 ASSAY GLUCOSE BLOOD QUANT: CPT

## 2023-04-02 RX ORDER — SODIUM CHLORIDE 0.9 % (FLUSH) 0.9 %
5-40 SYRINGE (ML) INJECTION PRN
Status: DISCONTINUED | OUTPATIENT
Start: 2023-04-02 | End: 2023-04-03 | Stop reason: HOSPADM

## 2023-04-02 RX ORDER — ATORVASTATIN CALCIUM 40 MG/1
40 TABLET, FILM COATED ORAL DAILY
Status: DISCONTINUED | OUTPATIENT
Start: 2023-04-03 | End: 2023-04-03 | Stop reason: HOSPADM

## 2023-04-02 RX ORDER — ASPIRIN 81 MG/1
81 TABLET ORAL DAILY
Status: DISCONTINUED | OUTPATIENT
Start: 2023-04-03 | End: 2023-04-03 | Stop reason: HOSPADM

## 2023-04-02 RX ORDER — SODIUM CHLORIDE 9 MG/ML
INJECTION, SOLUTION INTRAVENOUS CONTINUOUS
Status: DISCONTINUED | OUTPATIENT
Start: 2023-04-02 | End: 2023-04-03 | Stop reason: HOSPADM

## 2023-04-02 RX ORDER — POLYETHYLENE GLYCOL 3350 17 G/17G
17 POWDER, FOR SOLUTION ORAL DAILY PRN
Status: DISCONTINUED | OUTPATIENT
Start: 2023-04-02 | End: 2023-04-03 | Stop reason: HOSPADM

## 2023-04-02 RX ORDER — ONDANSETRON 2 MG/ML
4 INJECTION INTRAMUSCULAR; INTRAVENOUS ONCE
Status: COMPLETED | OUTPATIENT
Start: 2023-04-02 | End: 2023-04-02

## 2023-04-02 RX ORDER — MONTELUKAST SODIUM 10 MG/1
10 TABLET ORAL DAILY
Status: DISCONTINUED | OUTPATIENT
Start: 2023-04-03 | End: 2023-04-03 | Stop reason: HOSPADM

## 2023-04-02 RX ORDER — ENOXAPARIN SODIUM 100 MG/ML
40 INJECTION SUBCUTANEOUS DAILY
Status: DISCONTINUED | OUTPATIENT
Start: 2023-04-03 | End: 2023-04-03 | Stop reason: HOSPADM

## 2023-04-02 RX ORDER — INSULIN GLARGINE 100 [IU]/ML
20 INJECTION, SOLUTION SUBCUTANEOUS NIGHTLY
Status: DISCONTINUED | OUTPATIENT
Start: 2023-04-03 | End: 2023-04-03 | Stop reason: HOSPADM

## 2023-04-02 RX ORDER — ALOGLIPTIN 25 MG/1
25 TABLET, FILM COATED ORAL DAILY
Status: DISCONTINUED | OUTPATIENT
Start: 2023-04-03 | End: 2023-04-03 | Stop reason: HOSPADM

## 2023-04-02 RX ORDER — MORPHINE SULFATE 2 MG/ML
2 INJECTION, SOLUTION INTRAMUSCULAR; INTRAVENOUS
Status: DISCONTINUED | OUTPATIENT
Start: 2023-04-02 | End: 2023-04-03 | Stop reason: HOSPADM

## 2023-04-02 RX ORDER — ALBUTEROL SULFATE 90 UG/1
2 AEROSOL, METERED RESPIRATORY (INHALATION) 4 TIMES DAILY PRN
Status: DISCONTINUED | OUTPATIENT
Start: 2023-04-02 | End: 2023-04-02

## 2023-04-02 RX ORDER — 0.9 % SODIUM CHLORIDE 0.9 %
500 INTRAVENOUS SOLUTION INTRAVENOUS ONCE
Status: COMPLETED | OUTPATIENT
Start: 2023-04-02 | End: 2023-04-02

## 2023-04-02 RX ORDER — FENTANYL CITRATE 50 UG/ML
50 INJECTION, SOLUTION INTRAMUSCULAR; INTRAVENOUS ONCE
Status: COMPLETED | OUTPATIENT
Start: 2023-04-02 | End: 2023-04-02

## 2023-04-02 RX ORDER — ACETAMINOPHEN 650 MG/1
650 SUPPOSITORY RECTAL EVERY 6 HOURS PRN
Status: DISCONTINUED | OUTPATIENT
Start: 2023-04-02 | End: 2023-04-03 | Stop reason: HOSPADM

## 2023-04-02 RX ORDER — GLIPIZIDE 5 MG/1
10 TABLET ORAL
Status: DISCONTINUED | OUTPATIENT
Start: 2023-04-03 | End: 2023-04-03 | Stop reason: HOSPADM

## 2023-04-02 RX ORDER — SODIUM CHLORIDE 9 MG/ML
25 INJECTION, SOLUTION INTRAVENOUS PRN
Status: DISCONTINUED | OUTPATIENT
Start: 2023-04-02 | End: 2023-04-03 | Stop reason: HOSPADM

## 2023-04-02 RX ORDER — ALBUTEROL SULFATE 90 UG/1
2 AEROSOL, METERED RESPIRATORY (INHALATION) EVERY 4 HOURS PRN
Status: DISCONTINUED | OUTPATIENT
Start: 2023-04-02 | End: 2023-04-03 | Stop reason: HOSPADM

## 2023-04-02 RX ORDER — FENTANYL CITRATE 50 UG/ML
25 INJECTION, SOLUTION INTRAMUSCULAR; INTRAVENOUS ONCE
Status: COMPLETED | OUTPATIENT
Start: 2023-04-02 | End: 2023-04-02

## 2023-04-02 RX ORDER — ONDANSETRON 2 MG/ML
4 INJECTION INTRAMUSCULAR; INTRAVENOUS EVERY 6 HOURS PRN
Status: DISCONTINUED | OUTPATIENT
Start: 2023-04-02 | End: 2023-04-03 | Stop reason: HOSPADM

## 2023-04-02 RX ORDER — ONDANSETRON 4 MG/1
4 TABLET, ORALLY DISINTEGRATING ORAL EVERY 8 HOURS PRN
Status: DISCONTINUED | OUTPATIENT
Start: 2023-04-02 | End: 2023-04-03 | Stop reason: HOSPADM

## 2023-04-02 RX ORDER — INSULIN GLARGINE 100 [IU]/ML
16 INJECTION, SOLUTION SUBCUTANEOUS DAILY
Status: DISCONTINUED | OUTPATIENT
Start: 2023-04-03 | End: 2023-04-02

## 2023-04-02 RX ORDER — PANTOPRAZOLE SODIUM 40 MG/1
40 TABLET, DELAYED RELEASE ORAL
Status: DISCONTINUED | OUTPATIENT
Start: 2023-04-03 | End: 2023-04-03 | Stop reason: HOSPADM

## 2023-04-02 RX ORDER — SODIUM CHLORIDE 0.9 % (FLUSH) 0.9 %
5-40 SYRINGE (ML) INJECTION EVERY 12 HOURS SCHEDULED
Status: DISCONTINUED | OUTPATIENT
Start: 2023-04-02 | End: 2023-04-03 | Stop reason: HOSPADM

## 2023-04-02 RX ORDER — ACETAMINOPHEN 325 MG/1
650 TABLET ORAL EVERY 6 HOURS PRN
Status: DISCONTINUED | OUTPATIENT
Start: 2023-04-02 | End: 2023-04-03 | Stop reason: HOSPADM

## 2023-04-02 RX ORDER — LISINOPRIL 2.5 MG/1
2.5 TABLET ORAL DAILY
Status: DISCONTINUED | OUTPATIENT
Start: 2023-04-03 | End: 2023-04-03 | Stop reason: HOSPADM

## 2023-04-02 RX ORDER — MORPHINE SULFATE 4 MG/ML
4 INJECTION, SOLUTION INTRAMUSCULAR; INTRAVENOUS
Status: DISCONTINUED | OUTPATIENT
Start: 2023-04-02 | End: 2023-04-03 | Stop reason: HOSPADM

## 2023-04-02 RX ORDER — METOPROLOL SUCCINATE 25 MG/1
12.5 TABLET, EXTENDED RELEASE ORAL DAILY
Status: DISCONTINUED | OUTPATIENT
Start: 2023-04-03 | End: 2023-04-03 | Stop reason: HOSPADM

## 2023-04-02 RX ORDER — ALBUTEROL SULFATE 90 UG/1
2 AEROSOL, METERED RESPIRATORY (INHALATION) 3 TIMES DAILY PRN
Status: DISCONTINUED | OUTPATIENT
Start: 2023-04-02 | End: 2023-04-03

## 2023-04-02 RX ADMIN — ONDANSETRON 4 MG: 2 INJECTION INTRAMUSCULAR; INTRAVENOUS at 18:34

## 2023-04-02 RX ADMIN — SODIUM CHLORIDE: 9 INJECTION, SOLUTION INTRAVENOUS at 22:11

## 2023-04-02 RX ADMIN — SODIUM CHLORIDE 500 ML: 9 INJECTION, SOLUTION INTRAVENOUS at 18:33

## 2023-04-02 RX ADMIN — MORPHINE SULFATE 4 MG: 4 INJECTION, SOLUTION INTRAMUSCULAR; INTRAVENOUS at 23:27

## 2023-04-02 RX ADMIN — FENTANYL CITRATE 25 MCG: 50 INJECTION, SOLUTION INTRAMUSCULAR; INTRAVENOUS at 21:30

## 2023-04-02 RX ADMIN — CEFTRIAXONE 1000 MG: 1 INJECTION, POWDER, FOR SOLUTION INTRAMUSCULAR; INTRAVENOUS at 19:52

## 2023-04-02 RX ADMIN — INSULIN GLARGINE 20 UNITS: 100 INJECTION, SOLUTION SUBCUTANEOUS at 23:41

## 2023-04-02 RX ADMIN — FENTANYL CITRATE 50 MCG: 50 INJECTION, SOLUTION INTRAMUSCULAR; INTRAVENOUS at 18:35

## 2023-04-02 ASSESSMENT — PAIN SCALES - GENERAL
PAINLEVEL_OUTOF10: 7
PAINLEVEL_OUTOF10: 6
PAINLEVEL_OUTOF10: 3

## 2023-04-02 ASSESSMENT — PAIN DESCRIPTION - ONSET
ONSET: ON-GOING
ONSET: ON-GOING

## 2023-04-02 ASSESSMENT — PAIN - FUNCTIONAL ASSESSMENT
PAIN_FUNCTIONAL_ASSESSMENT: ACTIVITIES ARE NOT PREVENTED
PAIN_FUNCTIONAL_ASSESSMENT: ACTIVITIES ARE NOT PREVENTED
PAIN_FUNCTIONAL_ASSESSMENT: 0-10

## 2023-04-02 ASSESSMENT — PAIN DESCRIPTION - FREQUENCY
FREQUENCY: INTERMITTENT
FREQUENCY: INTERMITTENT

## 2023-04-02 ASSESSMENT — PAIN DESCRIPTION - LOCATION
LOCATION: FLANK

## 2023-04-02 ASSESSMENT — LIFESTYLE VARIABLES
HOW MANY STANDARD DRINKS CONTAINING ALCOHOL DO YOU HAVE ON A TYPICAL DAY: PATIENT DOES NOT DRINK
HOW MANY STANDARD DRINKS CONTAINING ALCOHOL DO YOU HAVE ON A TYPICAL DAY: PATIENT DOES NOT DRINK
HOW OFTEN DO YOU HAVE A DRINK CONTAINING ALCOHOL: NEVER
HOW OFTEN DO YOU HAVE A DRINK CONTAINING ALCOHOL: NEVER

## 2023-04-02 ASSESSMENT — PAIN DESCRIPTION - DESCRIPTORS
DESCRIPTORS: SHARP;SHOOTING
DESCRIPTORS: STABBING

## 2023-04-02 ASSESSMENT — PAIN DESCRIPTION - ORIENTATION
ORIENTATION: RIGHT

## 2023-04-02 ASSESSMENT — PAIN DESCRIPTION - PAIN TYPE
TYPE: ACUTE PAIN
TYPE: ACUTE PAIN

## 2023-04-02 NOTE — ED PROVIDER NOTES
BP Temp Temp Source Heart Rate Resp SpO2 Height Weight   118/86 99 °F (37.2 °C) Tympanic 94 18 97 % -- --       Physical Exam    DIAGNOSTIC RESULTS     EKG: All EKG's are interpreted by the Emergency Department Physician who either signs or Co-signs this chart in the absence of a cardiologist.      RADIOLOGY:   Non-plain film images such as CT, Ultrasound and MRI are read by the radiologist. Plain radiographic images are visualized and preliminarily interpreted by the emergency physician with the below findings:      Interpretation per the Radiologist below, if available at the time of this note:    CT ABDOMEN PELVIS WO CONTRAST Additional Contrast? None   Final Result   Solitary right kidney with unchanged mild hydronephrosis with point of   obstruction appearing to be right ureteropelvic junction, likely related to a   stenosis. No obstructing stone is visualized. Normal appendix. Fatty liver. Status post cholecystectomy      Fibroid uterus      13 mm adenoma of left adrenal gland.                ED BEDSIDE ULTRASOUND:   Performed by ED Physician - none    LABS:  Labs Reviewed   CBC WITH AUTO DIFFERENTIAL - Abnormal; Notable for the following components:       Result Value    WBC 16.0 (*)     Hemoglobin 15.7 (*)     Seg Neutrophils 69 (*)     Lymphocytes 23 (*)     Immature Granulocytes 1 (*)     Segs Absolute 11.18 (*)     All other components within normal limits   COMPREHENSIVE METABOLIC PANEL - Abnormal; Notable for the following components:    Glucose 166 (*)     ALT 35 (*)     All other components within normal limits   URINALYSIS - Abnormal; Notable for the following components:    Turbidity UA Cloudy (*)     Urine Hgb 3+ (*)     Protein, UA 2+ (*)     Nitrite, Urine POSITIVE (*)     Leukocyte Esterase, Urine SMALL (*)     All other components within normal limits   MICROSCOPIC URINALYSIS - Abnormal; Notable for the following components:    Bacteria, UA 2+ (*)     All other components

## 2023-04-03 VITALS
TEMPERATURE: 97.9 F | HEART RATE: 81 BPM | RESPIRATION RATE: 18 BRPM | BODY MASS INDEX: 36.13 KG/M2 | HEIGHT: 63 IN | SYSTOLIC BLOOD PRESSURE: 118 MMHG | OXYGEN SATURATION: 96 % | DIASTOLIC BLOOD PRESSURE: 70 MMHG | WEIGHT: 203.93 LBS

## 2023-04-03 LAB
ABSOLUTE EOS #: 0.17 K/UL (ref 0–0.44)
ABSOLUTE IMMATURE GRANULOCYTE: 0.05 K/UL (ref 0–0.3)
ABSOLUTE LYMPH #: 3.77 K/UL (ref 1.1–3.7)
ABSOLUTE MONO #: 0.67 K/UL (ref 0.1–1.2)
ALBUMIN SERPL-MCNC: 3.6 G/DL (ref 3.5–5.2)
ALBUMIN/GLOBULIN RATIO: 1.4 (ref 1–2.5)
ALP SERPL-CCNC: 64 U/L (ref 35–104)
ALT SERPL-CCNC: 27 U/L (ref 5–33)
ANION GAP SERPL CALCULATED.3IONS-SCNC: 9 MMOL/L (ref 9–17)
AST SERPL-CCNC: 14 U/L
BASOPHILS # BLD: 1 % (ref 0–2)
BASOPHILS ABSOLUTE: 0.05 K/UL (ref 0–0.2)
BILIRUB SERPL-MCNC: 0.2 MG/DL (ref 0.3–1.2)
BUN SERPL-MCNC: 13 MG/DL (ref 6–20)
BUN/CREAT BLD: 19 (ref 9–20)
CALCIUM SERPL-MCNC: 8.8 MG/DL (ref 8.6–10.4)
CHLORIDE SERPL-SCNC: 106 MMOL/L (ref 98–107)
CO2 SERPL-SCNC: 21 MMOL/L (ref 20–31)
CREAT SERPL-MCNC: 0.69 MG/DL (ref 0.5–0.9)
EOSINOPHILS RELATIVE PERCENT: 2 % (ref 1–4)
GFR SERPL CREATININE-BSD FRML MDRD: >60 ML/MIN/1.73M2
GLUCOSE SERPL-MCNC: 183 MG/DL (ref 70–99)
HCT VFR BLD AUTO: 39.8 % (ref 36.3–47.1)
HGB BLD-MCNC: 13.6 G/DL (ref 11.9–15.1)
IMMATURE GRANULOCYTES: 1 %
LYMPHOCYTES # BLD: 36 % (ref 24–43)
MCH RBC QN AUTO: 33.7 PG (ref 25.2–33.5)
MCHC RBC AUTO-ENTMCNC: 34.2 G/DL (ref 28.4–34.8)
MCV RBC AUTO: 98.5 FL (ref 82.6–102.9)
MONOCYTES # BLD: 6 % (ref 3–12)
NRBC AUTOMATED: 0 PER 100 WBC
PDW BLD-RTO: 12.4 % (ref 11.8–14.4)
PLATELET # BLD AUTO: 250 K/UL (ref 138–453)
PMV BLD AUTO: 9.9 FL (ref 8.1–13.5)
POTASSIUM SERPL-SCNC: 4 MMOL/L (ref 3.7–5.3)
PROT SERPL-MCNC: 6.2 G/DL (ref 6.4–8.3)
RBC # BLD: 4.04 M/UL (ref 3.95–5.11)
SEG NEUTROPHILS: 55 % (ref 36–65)
SEGMENTED NEUTROPHILS ABSOLUTE COUNT: 5.75 K/UL (ref 1.5–8.1)
SODIUM SERPL-SCNC: 136 MMOL/L (ref 135–144)
WBC # BLD AUTO: 10.5 K/UL (ref 3.5–11.3)

## 2023-04-03 PROCEDURE — 94761 N-INVAS EAR/PLS OXIMETRY MLT: CPT

## 2023-04-03 PROCEDURE — 6360000002 HC RX W HCPCS: Performed by: STUDENT IN AN ORGANIZED HEALTH CARE EDUCATION/TRAINING PROGRAM

## 2023-04-03 PROCEDURE — 6370000000 HC RX 637 (ALT 250 FOR IP): Performed by: STUDENT IN AN ORGANIZED HEALTH CARE EDUCATION/TRAINING PROGRAM

## 2023-04-03 PROCEDURE — 80053 COMPREHEN METABOLIC PANEL: CPT

## 2023-04-03 PROCEDURE — 97165 OT EVAL LOW COMPLEX 30 MIN: CPT

## 2023-04-03 PROCEDURE — 36415 COLL VENOUS BLD VENIPUNCTURE: CPT

## 2023-04-03 PROCEDURE — 85025 COMPLETE CBC W/AUTO DIFF WBC: CPT

## 2023-04-03 PROCEDURE — 6370000000 HC RX 637 (ALT 250 FOR IP): Performed by: NURSE PRACTITIONER

## 2023-04-03 RX ORDER — CIPROFLOXACIN 500 MG/1
500 TABLET, FILM COATED ORAL EVERY 12 HOURS SCHEDULED
Qty: 13 TABLET | Refills: 0 | Status: SHIPPED | OUTPATIENT
Start: 2023-04-03 | End: 2023-04-10

## 2023-04-03 RX ORDER — CIPROFLOXACIN 500 MG/1
500 TABLET, FILM COATED ORAL EVERY 12 HOURS SCHEDULED
Status: DISCONTINUED | OUTPATIENT
Start: 2023-04-03 | End: 2023-04-03 | Stop reason: HOSPADM

## 2023-04-03 RX ADMIN — METOPROLOL SUCCINATE 12.5 MG: 25 TABLET, EXTENDED RELEASE ORAL at 09:31

## 2023-04-03 RX ADMIN — MONTELUKAST 10 MG: 10 TABLET, FILM COATED ORAL at 09:31

## 2023-04-03 RX ADMIN — MORPHINE SULFATE 4 MG: 4 INJECTION, SOLUTION INTRAMUSCULAR; INTRAVENOUS at 04:51

## 2023-04-03 RX ADMIN — PANTOPRAZOLE SODIUM 40 MG: 40 TABLET, DELAYED RELEASE ORAL at 07:37

## 2023-04-03 RX ADMIN — ALOGLIPTIN 25 MG: 25 TABLET, FILM COATED ORAL at 09:31

## 2023-04-03 RX ADMIN — ASPIRIN 81 MG: 81 TABLET, COATED ORAL at 09:31

## 2023-04-03 RX ADMIN — LISINOPRIL 2.5 MG: 2.5 TABLET ORAL at 09:31

## 2023-04-03 RX ADMIN — MORPHINE SULFATE 4 MG: 4 INJECTION, SOLUTION INTRAMUSCULAR; INTRAVENOUS at 02:26

## 2023-04-03 RX ADMIN — CIPROFLOXACIN 500 MG: 500 TABLET, FILM COATED ORAL at 10:30

## 2023-04-03 RX ADMIN — METFORMIN HYDROCHLORIDE 1000 MG: 500 TABLET ORAL at 09:40

## 2023-04-03 RX ADMIN — ATORVASTATIN CALCIUM 40 MG: 40 TABLET, FILM COATED ORAL at 09:31

## 2023-04-03 RX ADMIN — GLIPIZIDE 10 MG: 5 TABLET ORAL at 07:37

## 2023-04-03 ASSESSMENT — PAIN DESCRIPTION - DESCRIPTORS
DESCRIPTORS: SHARP;SHOOTING
DESCRIPTORS: SORE
DESCRIPTORS: SHARP;SHOOTING

## 2023-04-03 ASSESSMENT — PAIN - FUNCTIONAL ASSESSMENT
PAIN_FUNCTIONAL_ASSESSMENT: ACTIVITIES ARE NOT PREVENTED

## 2023-04-03 ASSESSMENT — PAIN DESCRIPTION - ORIENTATION
ORIENTATION: RIGHT

## 2023-04-03 ASSESSMENT — PAIN DESCRIPTION - ONSET
ONSET: ON-GOING

## 2023-04-03 ASSESSMENT — PAIN DESCRIPTION - LOCATION
LOCATION: FLANK
LOCATION: ABDOMEN
LOCATION: FLANK

## 2023-04-03 ASSESSMENT — PAIN SCALES - GENERAL
PAINLEVEL_OUTOF10: 4
PAINLEVEL_OUTOF10: 7
PAINLEVEL_OUTOF10: 7

## 2023-04-03 ASSESSMENT — PAIN DESCRIPTION - PAIN TYPE
TYPE: ACUTE PAIN

## 2023-04-03 ASSESSMENT — PAIN DESCRIPTION - FREQUENCY
FREQUENCY: CONTINUOUS

## 2023-04-03 NOTE — DISCHARGE SUMMARY
04/03/2023    HGB 13.6 04/03/2023     04/03/2023       Lab Results   Component Value Date    BUN 13 04/03/2023    CREATININE 0.69 04/03/2023     04/03/2023    K 4.0 04/03/2023    CALCIUM 8.8 04/03/2023     04/03/2023    CO2 21 04/03/2023    LABGLOM >60 04/03/2023       Lab Results   Component Value Date    WBCUA 5 TO 10 04/02/2023    RBCUA GREATER THAN 100 04/02/2023    EPITHUA 0 TO 2 04/02/2023    LEUKOCYTESUR SMALL (A) 04/02/2023    SPECGRAV 1.020 04/02/2023    GLUCOSEU NEGATIVE 04/02/2023    KETUA NEGATIVE 04/02/2023    PROTEINU 2+ (A) 04/02/2023    HGBUR 3+ (A) 04/02/2023    CASTUA NOT REPORTED 02/24/2020    CRYSTUA NOT REPORTED 02/24/2020    BACTERIA 2+ (A) 04/02/2023    YEAST NOT REPORTED 02/24/2020       CT ABDOMEN PELVIS WO CONTRAST Additional Contrast? None    Result Date: 4/2/2023  EXAMINATION: CT OF THE ABDOMEN AND PELVIS WITHOUT CONTRAST 4/2/2023 6:45 pm TECHNIQUE: CT of the abdomen and pelvis was performed without the administration of intravenous contrast. Multiplanar reformatted images are provided for review. Automated exposure control, iterative reconstruction, and/or weight based adjustment of the mA/kV was utilized to reduce the radiation dose to as low as reasonably achievable. COMPARISON: 02/24/2020 HISTORY: ORDERING SYSTEM PROVIDED HISTORY: Right flank pain suspect urethral lithiasis TECHNOLOGIST PROVIDED HISTORY: Right flank pain suspect urethral lithiasis Decision Support Exception - unselect if not a suspected or confirmed emergency medical condition->Emergency Medical Condition (MA) Is the patient pregnant?->No FINDINGS: LOWER CHEST:  Visualized portion of the lower chest demonstrates no acute abnormality. Calcified granuloma within the right lower lobe. KIDNEYS AND URINARY TRACT: No renal calculi are identified. Solitary right kidney with unchanged mild hydronephrosis with point of obstruction appearing to be right ureteropelvic junction, likely related to a stenosis.

## 2023-04-03 NOTE — DISCHARGE INSTR - DIET

## 2023-04-03 NOTE — PROGRESS NOTES
Writer at bedside for reassessment. Patient sitting on edge of bed, respirations are even and unlabored while on room air. Vitals obtained and assessment completed, see flowsheet for details. Patient given pain medication per pt request. pt denies further needs at this time. Call light in reach.

## 2023-04-03 NOTE — PROGRESS NOTES
[facility]  Inpatient/Observation/Outpatient Rehabilitation    Date: 4/3/2023  Patient Name: Soledad De Jesus       [x] Inpatient Acute/Observation       []  Outpatient  : 1975       [] Pt no showed for scheduled appointment    [] Pt refused/declined therapy at this time due to:           [x] Pt cancelled due to:  [] No Reason Given   [] Sick/ill   [] Other:per nurse and patient, pt is completely independent with all functional mobility, no PT eval needed at this time    Therapist/Assistant will attempt to see this patient, at our earliest opportunity.        Mars Ramirez, PT Date: 4/3/2023

## 2023-04-03 NOTE — H&P
differently: Take 1 capsule by mouth nightly 11/22/22   Bullard Rule Might, APRN - CNP   SITagliptin (JANUVIA) 100 MG tablet TAKE 1 TABLET BY MOUTH EVERY DAY 11/22/22   Bullard Rule Might, APRN - CNP   montelukast (SINGULAIR) 10 MG tablet TAKE 1 TABLET BY MOUTH EVERY DAY 11/22/22   Bullard Rule Might, APRN - CNP   metoprolol succinate (TOPROL XL) 25 MG extended release tablet TAKE 1/2 TABLET BY MOUTH ONCE DAILY 11/22/22   Bullard Rule Might, APRN - CNP   metFORMIN (GLUCOPHAGE) 1000 MG tablet TAKE 1 TABLET BY MOUTH TWICE A DAY WITH MEALS 11/22/22   Bullard Rule Might, APRN - CNP   lisinopril (PRINIVIL;ZESTRIL) 2.5 MG tablet TAKE 1 TABLET BY MOUTH EVERY DAY 11/22/22 Campbell Rule Might, APRN - CNP   aspirin ([employer] ASPIRIN ADULT LOW STRENGTH) 81 MG EC tablet TAKE 1 TABLET BY MOUTH EVERY DAY 11/22/22 Campbell Rule Might, APRN - CNP   albuterol sulfate HFA (PROVENTIL;VENTOLIN;PROAIR) 108 (90 Base) MCG/ACT inhaler Inhale 2 puffs into the lungs 4 times daily as needed for Wheezing or Shortness of Breath 7/25/22 Campbell Rule Might, APRN - CNP   atorvastatin (LIPITOR) 40 MG tablet Take 1 tablet by mouth daily 5/25/22   Brianda Goins, APRN - CNP   ibuprofen (ADVIL;MOTRIN) 200 MG tablet Take 4 tablets by mouth every 6 hours as needed for Pain    Historical Provider, MD   CRANBERRY-VITAMIN C PO Take by mouth daily     Historical Provider, MD       Review of Systems:  Constitutional:negative  for fevers, and negative for chills.   Eyes: negative for visual disturbance   ENT: negative for sore throat, negative nasal congestion, and negative for earache  Respiratory: negative for shortness of breath, negative for cough, and negative for wheezing  Cardiovascular: negative for chest pain, negative for palpitations, and negative for syncope  Gastrointestinal: negative for abdominal pain, negative for nausea,negative for vomiting, negative for diarrhea, negative for constipation, and negative for hematochezia or melena  Genitourinary: negative for dysuria, negative for

## 2023-04-03 NOTE — PROGRESS NOTES
Patient A&Ox4, calm and cooperative, flat affect present. Assessment and vital signs completed, see flowsheet. Patient reports pain 4/10 to right flank, aching, denies any pharmaceutical intervention at this time. Lungs diminished in bilateral bases, coarse crackles heard in left lower lobe. Patient resting in bed, call light within reach, denies further needs, will continue to monitor.

## 2023-04-03 NOTE — CONSULTS
Hyperlipidemia    Syncope    PCOS (polycystic ovarian syndrome)    Gastroesophageal reflux disease    Class 2 obesity with body mass index (BMI) of 37.0 to 37.9 in adult    Uncontrolled type 2 diabetes mellitus with hyperglycemia (HCC)    UTI (urinary tract infection)       Plan: Empiric antibiotics, Rocephin now, patient's white blood cell has improved significantly. Urine culture is still pending  Patient will be discharged on ciprofloxacin. She does understand this is empiric therapy. We will need to follow-up on her urine culture. She can follow-up in our office in 2 weeks to discuss urinary tract infection prevention.   Gates Canavan, MD  8:07 AM 4/3/2023

## 2023-04-03 NOTE — CARE COORDINATION
No  Plans to Return to Present Housing: Yes  Other Identified Issues/Barriers to RETURNING to current housing: none  Potential Assistance needed at discharge: N/A            Potential DME:    Patient expects to discharge to: [address] [place] [address] for transportation at discharge:      Financial    Payor: Klaudia Villar / Plan: [address] [place] [address] PPO / Product Type: *No Product type* /     Does insurance require precert for SNF: Yes    Potential assistance Purchasing Medications: No  Meds-to-Beds request:        [facility]/pharmacy [address] [place] [address] - [address]  Phone: [phone] Fax: [phone]      Notes:    Factors facilitating achievement of predicted outcomes: Family support, Caregiver support, Motivated, Cooperative, Pleasant, Sense of humor, Good insight into deficits    Barriers to discharge: none    Additional Case Management Notes: Met with Pt in regard to any concerns for discharge.  lives in a home with her spouse and is employed. Uses no outside services for home needs. Denies any need for self care assistance and  is independent in those needs. Insurance plan covers her medications. Discussed her Decision Maker and requests that her , Axel Rosales be named. Introduced Advance Directives, however is not interested in pursuing at this time. No further questions or concerns voiced at present.       The Plan for Transition of Care is related to the following treatment goals of UTI (urinary tract infection) [N39.0]  Flank pain [R10.9]  Acute cystitis with hematuria [N30.01]  Hydronephrosis with ureteral stricture, not elsewhere classified [N13.1]  Leukocytosis, unspecified type [U81.006]          The Patient and/or Patient Representative Agree with the Discharge Plan?  yes    Iggy Melchor RN  Case Management Department  Ph: [phone]

## 2023-04-03 NOTE — RT PROTOCOL NOTE
Cheyne-Leal  [] Kussmaul  [] Biots    AMBULATORY  [x] Yes  [] No  [] With Assistance    PEAK FLOW  Predicted:     Personal Best:            Patient Acuity 0 1 2 3 4 Score   Level of Consciousness (LOC) [x]  Alert & Oriented or Pt normal LOC []  Confused;follows directions []  Confused & uncooper-ative []  Obtunded []  Comatose 0   Respiratory Rate  (RR) [x]  Reg. rate & pattern. 12 - 20 bpm  []  Increased RR. Greater than 20 bpm   []  SOB w/ exertion or RR greater than 24 bpm []  Access- ory muscle use at rest. Abn.  resp. []  SOB at rest.   0   Bilateral Breath Sounds (BBS) [x]  Clear []  Diminish-ed bases  []  Diminish-ed t/o, or rales   []  Sporadic, scattered wheezes or rhonchi []  Persistentwheezes and, or absent BBS 0   Cough [x]  Strong, effective, & non-prod. []  Effective & prod. Less than 25 ml (2 TBSP) over past 24 hrs []  Ineffective & non-prod to less than 25 ML over past 24 hrs []  Ineffective and, or greater than 25 ml sputum prod. past 24 hrs. []  Nonspon- taneous; Requires suctioning 0   Pulmonary History  (PULM HX) []  No smoking and no chronic pulmonary history []  Former smoker. Quit over 12 mos. ago []  Current smoker or quit w/ in 12 mos [x]  Pulm. History and, or 20 pk/yr smoking hx []  Admitted w/ acute pulm. dx and, or has been admitted w/ pulm. dx 2 or more times over past 12 mos 3   Surgical History this Admit  (SURG HX) [x]  No surgery []  General surgery []  Lower abdominal []  Thoracic or upper abdominal   []  Thoracic w/ pulm. disease 0   Chest X-Ray (CXR)/CT Scan [x]  Clear or not applicable []  Not available []  Atelectasis or pleural effusions []  Localized infiltrate or pulm.  edema []  Con-solidated Infiltrates, bilateral, or in more than 1 lobe 0   TOTAL ACUITY: 3       CARE PLAN    If Acuity Level is 2, 3, or 4 in any of the following:    [] BILATERAL BREATH SOUNDS (BBS)     [x] PULMONARY HISTORY (PULM HX)  [] Respiratory Rate  (RR)    Goal: Improve respiratory functions in

## 2023-04-03 NOTE — PROGRESS NOTES
Writer reviewed discharge instructions with patient. Patient aware of need to  prescription and aware of follow up appointments. Patient given information about UTI's. Patient denies questions. Copy of discharge instructions given to patient.

## 2023-04-03 NOTE — PROGRESS NOTES
Patient arrived at [facility] floor at this time via stretcher from ED. Patient ambulated to the bed without assistance. Patient alert and oriented x4. Vitals and assessment completed at this time, as charted. Admission navigator completed. Patient oriented to room and call light at this time. Patient denies further needs. Patient given snacks at this time. Call light within reach.

## 2023-04-03 NOTE — PROGRESS NOTES
Occupational Therapy  Facility/Department: [facility] AT THE [facility] MED SURG  Occupational Therapy Initial Assessment    Name: Freda Campos  : 1975  MRN: [id number]  Date of Service: 4/3/2023    Discharge Recommendations:  Home with assist PRN  OT Equipment Recommendations  Other: Pt. with possible kidney stone which may have passed. Pt. to DC home today. Patient Diagnosis(es): The primary encounter diagnosis was Flank pain. Diagnoses of Hydronephrosis with ureteral stricture, not elsewhere classified, Leukocytosis, unspecified type, and Acute cystitis with hematuria were also pertinent to this visit. Past Medical History:  has a past medical history of Anxiety, Depression, GERD (gastroesophageal reflux disease), H/O cardiovascular stress test, H/O echocardiogram, H/O tilt table evaluation, Hyperlipidemia, Hypertension, Kidney dysfunction, Obesity, and Type 2 diabetes mellitus without complication ([facility] [address] Past Surgical History:  has a past surgical history that includes Cholecystectomy; Kidney removal; Dilation and curettage of uterus (); bladder suspension; Carpal tunnel release; Intracapsular cataract extraction (Left, 2022); Intracapsular cataract extraction (Right, 10/24/2022); and eye surgery (). Treatment Diagnosis: UTI      Assessment   Assessment: Pt. Ind. with self care and ambulation without device. Treatment Diagnosis: UTI  Prognosis: Good  Decision Making: Low Complexity  Assistance / Modification: Ind. No Skilled OT: Independent with functional mobility; Independent with ADL's;Safe to return home; No OT goals identified  REQUIRES OT FOLLOW-UP: No  Activity Tolerance  Activity Tolerance: Patient Tolerated treatment well        Plan   Occupational Therapy Plan  Times Per Week: Eval only  Additional Comments: No further OT. Pt. Ind.      Restrictions  Restrictions/Precautions  Restrictions/Precautions: General Precautions    Subjective   General  Chart Reviewed: Yes  Response to

## 2023-04-04 ENCOUNTER — CARE COORDINATION (OUTPATIENT)
Dept: CASE MANAGEMENT | Age: 48
End: 2023-04-04

## 2023-04-04 DIAGNOSIS — N39.0 URINARY TRACT INFECTION WITHOUT HEMATURIA, SITE UNSPECIFIED: Primary | ICD-10-CM

## 2023-04-04 LAB
MICROORGANISM SPEC CULT: ABNORMAL
SPECIMEN DESCRIPTION: ABNORMAL

## 2023-04-04 NOTE — CARE COORDINATION
or concerns at this time. Were discharge instructions available to patient? Yes. Reviewed appropriate site of care based on symptoms and resources available to patient including: PCP  Specialist. The patient agrees to contact the PCP office for questions related to their healthcare. Advance Care Planning:   Does patient have an Advance Directive: not on file; education provided. Medication reconciliation was performed with patient, who verbalizes understanding of administration of home medications. Medications reviewed, 1111F entered: yes    Was patient discharged with a pulse oximeter? no    Non-face-to-face services provided:  Scheduled appointment with PCP-4/12  Scheduled appointment with [facility]  Obtained and reviewed discharge summary and/or continuity of care documents    Offered patient enrollment in the Remote Patient Monitoring (RPM) program for in-home monitoring: Patient is not eligible for RPM program.    Care Transitions 24 Hour Call    Schedule Follow Up Appointment with PCP: Completed  Do you have a copy of your discharge instructions?: Yes  Do you have all of your prescriptions and are they filled?: Yes  Have you been contacted by a [facility] Pharmacist?: No  Have you scheduled your follow up appointment?: Yes  How are you going to get to your appointment?: Car - drive self  Do you feel like you have everything you need to keep you well at home?: Yes  Care Transitions Interventions         Discussed follow-up appointments. If no appointment was previously scheduled, appointment scheduling offered: No.   Is follow up appointment scheduled within 7 days of discharge? Yes.     Follow Up  Future Appointments   Date Time Provider Daryl Guillaume   4/12/2023  2:20 PM RONN Patrick CNP [facility]   4/18/2023  9:00 AM SCHEDULE, [facility] UROLOGY GREEN PT SCHEDULE [facility]FF UROLOGY [facility]   6/20/2023  4:20 PM RONN Patrick CNP [facility]       Care Transition

## 2023-04-26 PROBLEM — N30.01 ACUTE CYSTITIS WITH HEMATURIA: Status: ACTIVE | Noted: 2023-04-26

## 2023-05-02 PROBLEM — N39.0 UTI (URINARY TRACT INFECTION): Status: RESOLVED | Noted: 2023-04-02 | Resolved: 2023-05-02

## 2023-05-19 DIAGNOSIS — E11.9 TYPE 2 DIABETES MELLITUS WITHOUT COMPLICATION, WITHOUT LONG-TERM CURRENT USE OF INSULIN (HCC): ICD-10-CM

## 2023-05-19 DIAGNOSIS — K21.9 GASTRO-ESOPHAGEAL REFLUX DISEASE WITHOUT ESOPHAGITIS: ICD-10-CM

## 2023-05-22 RX ORDER — OMEPRAZOLE 40 MG/1
CAPSULE, DELAYED RELEASE ORAL
Qty: 90 CAPSULE | Refills: 0 | Status: SHIPPED | OUTPATIENT
Start: 2023-05-22

## 2023-05-22 RX ORDER — LISINOPRIL 2.5 MG/1
TABLET ORAL
Qty: 90 TABLET | Refills: 0 | Status: SHIPPED | OUTPATIENT
Start: 2023-05-22

## 2023-05-22 RX ORDER — METOPROLOL SUCCINATE 25 MG/1
TABLET, EXTENDED RELEASE ORAL
Qty: 45 TABLET | Refills: 3 | Status: SHIPPED | OUTPATIENT
Start: 2023-05-22

## 2023-05-22 RX ORDER — ATORVASTATIN CALCIUM 40 MG/1
TABLET, FILM COATED ORAL
Qty: 90 TABLET | Refills: 1 | Status: SHIPPED | OUTPATIENT
Start: 2023-05-22

## 2023-05-22 NOTE — TELEPHONE ENCOUNTER
Health Maintenance   Topic Date Due    Colorectal Cancer Screen  Never done    A1C test (Diabetic or Prediabetic)  06/20/2023    Hepatitis B vaccine (1 of 3 - Risk 3-dose series) 05/25/2023 (Originally 7/9/1996)    Hepatitis C screen  05/25/2023 (Originally 7/9/1995)    HIV screen  05/25/2023 (Originally 7/9/1992)    Diabetic foot exam  08/25/2023 (Originally 8/12/2022)    DTaP/Tdap/Td vaccine (1 - Tdap) 08/25/2023 (Originally 7/9/1996)    Cervical cancer screen  08/25/2023 (Originally 7/9/1998)    Diabetic retinal exam  09/04/2023 (Originally 8/17/2021)    COVID-19 Vaccine (4 - Booster for Proctor Herminio series) 11/22/2023 (Originally 4/2/2022)    Diabetic Alb to Cr ratio (uACR) test  11/21/2023    Lipids  12/19/2023    Depression Screen  03/20/2024    GFR test (Diabetes, CKD 3-4, OR last GFR 15-59)  04/03/2024    Flu vaccine  Completed    Pneumococcal 0-64 years Vaccine  Completed    Hepatitis A vaccine  Aged Out    Hib vaccine  Aged Out    Meningococcal (ACWY) vaccine  Aged Out    Depression Monitoring  Discontinued             (applicable per patient's age: Cancer Screenings, Depression Screening, Fall Risk Screening, Immunizations)    Hemoglobin A1C (%)   Date Value   03/20/2023 9.8   11/21/2022 9.1 (H)   08/25/2022 9.2     Microalb/Crt.  Ratio (mcg/mg creat)   Date Value   11/21/2022 Can not be calculated     LDL Cholesterol (mg/dL)   Date Value   12/19/2022          AST (U/L)   Date Value   04/03/2023 14     ALT (U/L)   Date Value   04/03/2023 27     BUN (mg/dL)   Date Value   04/03/2023 13      (goal A1C is < 7)   (goal LDL is <100) need 30-50% reduction from baseline     BP Readings from Last 3 Encounters:   04/03/23 118/70   03/20/23 128/74   11/30/22 112/74    (goal /80)      All Future Testing planned in [facility]:  Lab Frequency Next Occurrence   SARA BREANNE DIGITAL SCREEN BILATERAL Once 04/20/2023       Next Visit Date:  Future Appointments   Date Time Provider Daryl Guillaume   6/20/2023  4:20 PM Kelle Awan

## 2023-05-22 NOTE — TELEPHONE ENCOUNTER
Health Maintenance   Topic Date Due    Colorectal Cancer Screen  Never done    A1C test (Diabetic or Prediabetic)  06/20/2023    Hepatitis B vaccine (1 of 3 - Risk 3-dose series) 05/25/2023 (Originally 7/9/1996)    Hepatitis C screen  05/25/2023 (Originally 7/9/1995)    HIV screen  05/25/2023 (Originally 7/9/1992)    Diabetic foot exam  08/25/2023 (Originally 8/12/2022)    DTaP/Tdap/Td vaccine (1 - Tdap) 08/25/2023 (Originally 7/9/1996)    Cervical cancer screen  08/25/2023 (Originally 7/9/1998)    Diabetic retinal exam  09/04/2023 (Originally 8/17/2021)    COVID-19 Vaccine (4 - Booster for Proctor Herminio series) 11/22/2023 (Originally 4/2/2022)    Diabetic Alb to Cr ratio (uACR) test  11/21/2023    Lipids  12/19/2023    Depression Screen  03/20/2024    GFR test (Diabetes, CKD 3-4, OR last GFR 15-59)  04/03/2024    Flu vaccine  Completed    Pneumococcal 0-64 years Vaccine  Completed    Hepatitis A vaccine  Aged Out    Hib vaccine  Aged Out    Meningococcal (ACWY) vaccine  Aged Out    Depression Monitoring  Discontinued             (applicable per patient's age: Cancer Screenings, Depression Screening, Fall Risk Screening, Immunizations)    Hemoglobin A1C (%)   Date Value   03/20/2023 9.8   11/21/2022 9.1 (H)   08/25/2022 9.2     Microalb/Crt.  Ratio (mcg/mg creat)   Date Value   11/21/2022 Can not be calculated     LDL Cholesterol (mg/dL)   Date Value   12/19/2022          AST (U/L)   Date Value   04/03/2023 14     ALT (U/L)   Date Value   04/03/2023 27     BUN (mg/dL)   Date Value   04/03/2023 13      (goal A1C is < 7)   (goal LDL is <100) need 30-50% reduction from baseline     BP Readings from Last 3 Encounters:   04/03/23 118/70   03/20/23 128/74   11/30/22 112/74    (goal /80)      All Future Testing planned in [facility]:  Lab Frequency Next Occurrence   SARA BREANNE DIGITAL SCREEN BILATERAL Once 04/20/2023       Next Visit Date:  Future Appointments   Date Time Provider Daryl Guillaume   6/20/2023  4:20 PM Samanta Lara

## 2023-06-19 DIAGNOSIS — E11.65 UNCONTROLLED TYPE 2 DIABETES MELLITUS WITH HYPERGLYCEMIA (HCC): ICD-10-CM

## 2023-06-19 RX ORDER — BLOOD-GLUCOSE SENSOR
EACH MISCELLANEOUS
Qty: 4 EACH | Refills: 3 | Status: SHIPPED | OUTPATIENT
Start: 2023-06-19

## 2023-06-19 NOTE — TELEPHONE ENCOUNTER
MAMMOGRAPHY ROOM AT [facility] [address] [facility] Rad            Patient Active Problem List:     Tobacco abuse     Single kidney     Hyperlipidemia     Syncope     PCOS (polycystic ovarian syndrome)     Gastroesophageal reflux disease     Class 2 obesity with body mass index (BMI) of 37.0 to 37.9 in adult     Uncontrolled type 2 diabetes mellitus with hyperglycemia (HCC)     Acute cystitis with hematuria

## 2023-07-23 DIAGNOSIS — E11.9 TYPE 2 DIABETES MELLITUS WITHOUT COMPLICATION, WITHOUT LONG-TERM CURRENT USE OF INSULIN (HCC): ICD-10-CM

## 2023-07-23 DIAGNOSIS — K21.9 GASTRO-ESOPHAGEAL REFLUX DISEASE WITHOUT ESOPHAGITIS: ICD-10-CM

## 2023-07-23 RX ORDER — OMEPRAZOLE 40 MG/1
CAPSULE, DELAYED RELEASE ORAL
Qty: 30 CAPSULE | Refills: 5 | Status: SHIPPED | OUTPATIENT
Start: 2023-07-23

## 2023-07-23 RX ORDER — LISINOPRIL 2.5 MG/1
TABLET ORAL
Qty: 30 TABLET | Refills: 5 | Status: SHIPPED | OUTPATIENT
Start: 2023-07-23

## 2023-07-23 RX ORDER — ASPIRIN 81 MG/1
TABLET ORAL
Qty: 30 TABLET | Refills: 5 | Status: SHIPPED | OUTPATIENT
Start: 2023-07-23

## 2023-08-07 ENCOUNTER — HOSPITAL ENCOUNTER (EMERGENCY)
Age: 48
Discharge: HOME | End: 2023-08-07
Attending: STUDENT IN AN ORGANIZED HEALTH CARE EDUCATION/TRAINING PROGRAM
Payer: COMMERCIAL

## 2023-08-07 VITALS
HEIGHT: 63 IN | TEMPERATURE: 97.8 F | RESPIRATION RATE: 18 BRPM | DIASTOLIC BLOOD PRESSURE: 95 MMHG | SYSTOLIC BLOOD PRESSURE: 142 MMHG | OXYGEN SATURATION: 97 % | WEIGHT: 210 LBS | HEART RATE: 85 BPM | BODY MASS INDEX: 37.21 KG/M2

## 2023-08-07 DIAGNOSIS — S16.1XXA ACUTE STRAIN OF NECK MUSCLE, INITIAL ENCOUNTER: Primary | ICD-10-CM

## 2023-08-07 PROCEDURE — 6360000002 HC RX W HCPCS: Performed by: STUDENT IN AN ORGANIZED HEALTH CARE EDUCATION/TRAINING PROGRAM

## 2023-08-07 PROCEDURE — 96372 THER/PROPH/DIAG INJ SC/IM: CPT

## 2023-08-07 PROCEDURE — 99284 EMERGENCY DEPT VISIT MOD MDM: CPT

## 2023-08-07 RX ORDER — CYCLOBENZAPRINE HCL 5 MG
5 TABLET ORAL 2 TIMES DAILY PRN
Qty: 10 TABLET | Refills: 0 | Status: SHIPPED | OUTPATIENT
Start: 2023-08-07 | End: 2023-08-17

## 2023-08-07 RX ORDER — ORPHENADRINE CITRATE 30 MG/ML
60 INJECTION INTRAMUSCULAR; INTRAVENOUS ONCE
Status: COMPLETED | OUTPATIENT
Start: 2023-08-07 | End: 2023-08-07

## 2023-08-07 RX ORDER — KETOROLAC TROMETHAMINE 30 MG/ML
30 INJECTION, SOLUTION INTRAMUSCULAR; INTRAVENOUS ONCE
Status: COMPLETED | OUTPATIENT
Start: 2023-08-07 | End: 2023-08-07

## 2023-08-07 RX ADMIN — ORPHENADRINE CITRATE 60 MG: 60 INJECTION INTRAMUSCULAR; INTRAVENOUS at 19:55

## 2023-08-07 RX ADMIN — KETOROLAC TROMETHAMINE 30 MG: 30 INJECTION, SOLUTION INTRAMUSCULAR at 19:56

## 2023-08-07 ASSESSMENT — PAIN SCALES - GENERAL
PAINLEVEL_OUTOF10: 5
PAINLEVEL_OUTOF10: 6

## 2023-08-07 ASSESSMENT — PAIN - FUNCTIONAL ASSESSMENT: PAIN_FUNCTIONAL_ASSESSMENT: 0-10

## 2023-08-07 ASSESSMENT — PAIN DESCRIPTION - PAIN TYPE: TYPE: ACUTE PAIN

## 2023-08-07 ASSESSMENT — PAIN DESCRIPTION - ORIENTATION
ORIENTATION: RIGHT;LEFT;UPPER
ORIENTATION: RIGHT;LEFT

## 2023-08-07 ASSESSMENT — PAIN DESCRIPTION - FREQUENCY: FREQUENCY: CONTINUOUS

## 2023-08-07 ASSESSMENT — PAIN DESCRIPTION - LOCATION
LOCATION: BACK
LOCATION: SHOULDER

## 2023-08-07 NOTE — ED PROVIDER NOTES
symptomatic relief and have her follow-up with occupational health tomorrow for her drug test    Risk  Prescription drug management. EKG      All EKG's are interpreted by the Emergency Department Physician who either signs or Co-signs this chart in the absence of a cardiologist.    EMERGENCY DEPARTMENT COURSE:           PROCEDURES:      CONSULTS:  None    CRITICAL CARE:  There was significant risk of life threatening deterioration of patient's condition requiring my direct management. Critical care time  0 minutes, excluding any documented procedures. FINAL IMPRESSION      1. Acute strain of neck muscle, initial encounter          DISPOSITION / PLAN     DISPOSITION Decision To Discharge 08/07/2023 07:42:57 PM      PATIENT REFERRED TO:  No follow-up provider specified.     DISCHARGE MEDICATIONS:  New Prescriptions    CYCLOBENZAPRINE (FLEXERIL) 5 MG TABLET    Take 1 tablet by mouth 2 times daily as needed for Muscle spasms       Anjel Butler MD  Emergency Medicine Physician     (Please note that portions of thisnote were completed with a voice recognition program.  Efforts were made to edit the dictations but occasionally words are mis-transcribed.)       Anjel Butler MD  Resident  08/07/23 [id number]

## 2023-08-24 ENCOUNTER — OFFICE VISIT (OUTPATIENT)
Dept: PRIMARY CARE CLINIC | Age: 48
End: 2023-08-24
Payer: COMMERCIAL

## 2023-08-24 VITALS
DIASTOLIC BLOOD PRESSURE: 76 MMHG | SYSTOLIC BLOOD PRESSURE: 118 MMHG | HEART RATE: 112 BPM | WEIGHT: 194.2 LBS | OXYGEN SATURATION: 96 % | TEMPERATURE: 98.1 F | BODY MASS INDEX: 34.4 KG/M2 | RESPIRATION RATE: 18 BRPM

## 2023-08-24 DIAGNOSIS — J30.9 ALLERGIC RHINITIS, UNSPECIFIED: ICD-10-CM

## 2023-08-24 DIAGNOSIS — J20.9 BRONCHITIS WITH BRONCHOSPASM: Primary | ICD-10-CM

## 2023-08-24 DIAGNOSIS — J02.9 PHARYNGITIS, UNSPECIFIED ETIOLOGY: ICD-10-CM

## 2023-08-24 LAB — S PYO AG THROAT QL: NORMAL

## 2023-08-24 PROCEDURE — 99214 OFFICE O/P EST MOD 30 MIN: CPT | Performed by: NURSE PRACTITIONER

## 2023-08-24 PROCEDURE — 87880 STREP A ASSAY W/OPTIC: CPT | Performed by: NURSE PRACTITIONER

## 2023-08-24 RX ORDER — BENZONATATE 100 MG/1
100-200 CAPSULE ORAL 3 TIMES DAILY PRN
Qty: 60 CAPSULE | Refills: 0 | Status: SHIPPED | OUTPATIENT
Start: 2023-08-24 | End: 2023-08-31

## 2023-08-24 RX ORDER — GUAIFENESIN AND CODEINE PHOSPHATE 100; 10 MG/5ML; MG/5ML
10 SOLUTION ORAL 2 TIMES DAILY PRN
Qty: 120 ML | Refills: 0 | Status: SHIPPED | OUTPATIENT
Start: 2023-08-24 | End: 2023-08-29

## 2023-08-24 RX ORDER — DOXYCYCLINE HYCLATE 100 MG
100 TABLET ORAL 2 TIMES DAILY
Qty: 20 TABLET | Refills: 0 | Status: SHIPPED | OUTPATIENT
Start: 2023-08-24 | End: 2023-09-03

## 2023-08-24 ASSESSMENT — ENCOUNTER SYMPTOMS
SHORTNESS OF BREATH: 1
WHEEZING: 1
EYES NEGATIVE: 1
RHINORRHEA: 0
GASTROINTESTINAL NEGATIVE: 1
COUGH: 1
SORE THROAT: 1
ALLERGIC/IMMUNOLOGIC NEGATIVE: 1

## 2023-08-24 NOTE — PATIENT INSTRUCTIONS
SURVEY:     You may be receiving a survey from [employer] regarding your visit today. Please complete the survey to enable us to provide the highest quality of care to you and your family. If you cannot score us a very good on any question, please call the office to discuss how we could have made your experience a very good one.      Thank you,    Holly Lindsay, APRN-CNP  Tory Lobato, APRN-CNP  Gogo Chavez, ROSARION  Beronica Copper, CMA  Brighton, CMA  Luly, CMA  Ankita, PCA  Tamiko, PM

## 2023-08-24 NOTE — PROGRESS NOTES
[facility]  Luly Cardozo, [address] PRIMARY CARE  [address] [facility] [address] [facility]  Dept: [phone]  Dept Fax: [phone]      Name: Mery Galeas  : 1975         Chief Complaint:     Chief Complaint   Patient presents with    Sore Throat     X 4 days. Cough     X 4 days. Headache     X 4 days. History of Present Illness:      Mery Galeas is a 57 y.o.  female who presents with Sore Throat (X 4 days. ), Cough (X 4 days. ), and Headache (X 4 days. )      HPI  Sapna Malhotra is here today for complaints of sore throat, cough, and headache for 4 days. She is feeling worse today. She has been running fevers constantly. She is taking Ibuprofen around the clock for her headache and as soon as it wears off she has a fever. Mainly a dry cough. Denies ear pain. No rhinorrhea. She has body aches all over. She has increased fatigue. She has had some wheezing. Denies shortness of breath. She has not taken any OTC medications for her symptoms. The cough is keeping her awake at night. She has taken a covid test at home that was negative. Past Medical History:     Past Medical History:   Diagnosis Date    Anxiety     Depression     GERD (gastroesophageal reflux disease)     H/O cardiovascular stress test 2018    Largerly normal myocardial perfusion imaging with soft tissue artifact but without evidence of signinfcant myocardial ischemia or infarction. EF 75%. The pt duke treadmill score is 6. H/O echocardiogram 2018    EF >70%. The LV wallt thickness is mildly incrased. H/O tilt table evaluation 2018    Borderline abnormal head upright tilt table study. Although the pt heart rate, blood pressure and symptoms were suggestive of orthostatic intolerance. Therefore, if clinically suspicion remains high, a trial of empiric trreatment and/or re-testing may be indicated.      Hyperlipidemia     Hypertension

## 2023-08-25 RX ORDER — MONTELUKAST SODIUM 10 MG/1
TABLET ORAL
Qty: 90 TABLET | Refills: 1 | Status: SHIPPED | OUTPATIENT
Start: 2023-08-25

## 2023-08-25 NOTE — TELEPHONE ENCOUNTER
Health Maintenance   Topic Date Due    Colorectal Cancer Screen  Never done    A1C test (Diabetic or Prediabetic)  06/20/2023    Flu vaccine (1) 08/01/2023    Diabetic foot exam  08/25/2023 (Originally 8/12/2022)    DTaP/Tdap/Td vaccine (1 - Tdap) 08/25/2023 (Originally 7/9/1996)    Cervical cancer screen  08/25/2023 (Originally 7/9/1998)    Diabetic retinal exam  09/04/2023 (Originally 8/17/2021)    COVID-19 Vaccine (4 - Booster for Pfizer series) 11/22/2023 (Originally 4/2/2022)    Hepatitis B vaccine (1 of 3 - Risk 3-dose series) 08/24/2024 (Originally 7/9/1996)    Hepatitis C screen  08/24/2024 (Originally 7/9/1995)    HIV screen  08/24/2024 (Originally 7/9/1992)    Diabetic Alb to Cr ratio (uACR) test  11/21/2023    Lipids  12/19/2023    GFR test (Diabetes, CKD 3-4, OR last GFR 15-59)  04/03/2024    Depression Screen  08/24/2024    Pneumococcal 0-64 years Vaccine  Completed    Hepatitis A vaccine  Aged Out    Hib vaccine  Aged Out    Meningococcal (ACWY) vaccine  Aged Out    Depression Monitoring  Discontinued             (applicable per patient's age: Cancer Screenings, Depression Screening, Fall Risk Screening, Immunizations)    Hemoglobin A1C (%)   Date Value   03/20/2023 9.8   11/21/2022 9.1 (H)   08/25/2022 9.2     LDL Cholesterol (mg/dL)   Date Value   12/19/2022          AST (U/L)   Date Value   04/03/2023 14     ALT (U/L)   Date Value   04/03/2023 27     BUN (mg/dL)   Date Value   04/03/2023 13      (goal A1C is < 7)   (goal LDL is <100) need 30-50% reduction from baseline     BP Readings from Last 3 Encounters:   08/24/23 118/76   08/07/23 (!) 142/95   04/03/23 118/70    (goal /80)      All Future Testing planned in [facility]:  Lab Frequency Next Occurrence   SARA BREANNE DIGITAL SCREEN BILATERAL Once 04/20/2023       Next Visit Date:  Future Appointments   Date Time Provider [address]   9/11/2023  4:00 PM Tess Lindsay APRN - CNP [facility]            Patient Active Problem List:

## 2023-09-20 DIAGNOSIS — E11.65 UNCONTROLLED TYPE 2 DIABETES MELLITUS WITH HYPERGLYCEMIA (HCC): ICD-10-CM

## 2023-09-20 RX ORDER — GLIMEPIRIDE 4 MG/1
4 TABLET ORAL
Qty: 30 TABLET | Refills: 11 | Status: SHIPPED | OUTPATIENT
Start: 2023-09-20

## 2023-09-25 ENCOUNTER — E-VISIT (OUTPATIENT)
Dept: PRIMARY CARE CLINIC | Age: 48
End: 2023-09-25
Payer: COMMERCIAL

## 2023-09-25 DIAGNOSIS — N76.0 ACUTE VAGINITIS: Primary | ICD-10-CM

## 2023-09-25 PROCEDURE — 99422 OL DIG E/M SVC 11-20 MIN: CPT | Performed by: NURSE PRACTITIONER

## 2023-09-25 NOTE — PROGRESS NOTES
Elige Simmonds (1975) initiated an asynchronous digital communication through [address] [place] [address]. HPI: per patient questionnaire     Exam: not applicable    Diagnoses and all orders for this visit:  Diagnoses and all orders for this visit:    Acute vaginitis    Reviewed patient's questionnaire. She complains of having a yeast infection about a month or so ago. No resolution with over-the-counter management. I encouraged that she be seen in person for vaginitis testing. Supportive care measures were provided. Time: EV2 - 11-20 minutes were spent on the digital evaluation and management of this patient.  18 min     Frankey Huddle, RONN - CNP

## 2023-09-28 DIAGNOSIS — E11.9 TYPE 2 DIABETES MELLITUS WITHOUT COMPLICATION, WITHOUT LONG-TERM CURRENT USE OF INSULIN (HCC): ICD-10-CM

## 2023-09-28 RX ORDER — ATORVASTATIN CALCIUM 40 MG/1
40 TABLET, FILM COATED ORAL DAILY
Qty: 90 TABLET | Refills: 1 | Status: SHIPPED | OUTPATIENT
Start: 2023-09-28

## 2023-09-28 NOTE — TELEPHONE ENCOUNTER
Health Maintenance   Topic Date Due    DTaP/Tdap/Td vaccine (1 - Tdap) Never done    Cervical cancer screen  Never done    Pneumococcal 0-64 years Vaccine (2 - PCV) 02/15/2019    Diabetic retinal exam  08/17/2021    Colorectal Cancer Screen  Never done    Diabetic foot exam  08/12/2022    A1C test (Diabetic or Prediabetic)  06/20/2023    Flu vaccine (1) 08/01/2023    COVID-19 Vaccine (4 - Pfizer series) 11/22/2023 (Originally 4/2/2022)    Hepatitis B vaccine (1 of 3 - 3-dose series) 08/24/2024 (Originally 1975)    Hepatitis C screen  08/24/2024 (Originally 7/9/1995)    HIV screen  08/24/2024 (Originally 7/9/1992)    Diabetic Alb to Cr ratio (uACR) test  11/21/2023    Lipids  12/19/2023    GFR test (Diabetes, CKD 3-4, OR last GFR 15-59)  04/03/2024    Depression Screen  08/24/2024    Hepatitis A vaccine  Aged Out    Hib vaccine  Aged Out    HPV vaccine  Aged Out    Meningococcal (ACWY) vaccine  Aged Out    Depression Monitoring  Discontinued             (applicable per patient's age: Cancer Screenings, Depression Screening, Fall Risk Screening, Immunizations)    Hemoglobin A1C (%)   Date Value   03/20/2023 9.8   11/21/2022 9.1 (H)   08/25/2022 9.2     LDL Cholesterol (mg/dL)   Date Value   12/19/2022          AST (U/L)   Date Value   04/03/2023 14     ALT (U/L)   Date Value   04/03/2023 27     BUN (mg/dL)   Date Value   04/03/2023 13      (goal A1C is < 7)   (goal LDL is <100) need 30-50% reduction from baseline     BP Readings from Last 3 Encounters:   08/24/23 118/76   08/07/23 (!) 142/95   04/03/23 118/70    (goal /80)      All Future Testing planned in [facility]:  Lab Frequency Next Occurrence   SARA BREANNE DIGITAL SCREEN BILATERAL Once 04/20/2023       Next Visit Date:  Future Appointments   Date Time Provider [address]   10/12/2023  4:20 PM Cem Lindsay, APRN - CNP [facility]            Patient Active Problem List:     Tobacco abuse     Single kidney     Hyperlipidemia     Syncope

## 2023-09-28 NOTE — TELEPHONE ENCOUNTER
Health Maintenance   Topic Date Due    DTaP/Tdap/Td vaccine (1 - Tdap) Never done    Cervical cancer screen  Never done    Pneumococcal 0-64 years Vaccine (2 - PCV) 02/15/2019    Diabetic retinal exam  08/17/2021    Colorectal Cancer Screen  Never done    Diabetic foot exam  08/12/2022    A1C test (Diabetic or Prediabetic)  06/20/2023    Flu vaccine (1) 08/01/2023    COVID-19 Vaccine (4 - Pfizer series) 11/22/2023 (Originally 4/2/2022)    Hepatitis B vaccine (1 of 3 - 3-dose series) 08/24/2024 (Originally 1975)    Hepatitis C screen  08/24/2024 (Originally 7/9/1995)    HIV screen  08/24/2024 (Originally 7/9/1992)    Diabetic Alb to Cr ratio (uACR) test  11/21/2023    Lipids  12/19/2023    GFR test (Diabetes, CKD 3-4, OR last GFR 15-59)  04/03/2024    Depression Screen  08/24/2024    Hepatitis A vaccine  Aged Out    Hib vaccine  Aged Out    HPV vaccine  Aged Out    Meningococcal (ACWY) vaccine  Aged Out    Depression Monitoring  Discontinued             (applicable per patient's age: Cancer Screenings, Depression Screening, Fall Risk Screening, Immunizations)    Hemoglobin A1C (%)   Date Value   03/20/2023 9.8   11/21/2022 9.1 (H)   08/25/2022 9.2     LDL Cholesterol (mg/dL)   Date Value   12/19/2022          AST (U/L)   Date Value   04/03/2023 14     ALT (U/L)   Date Value   04/03/2023 27     BUN (mg/dL)   Date Value   04/03/2023 13      (goal A1C is < 7)   (goal LDL is <100) need 30-50% reduction from baseline     BP Readings from Last 3 Encounters:   08/24/23 118/76   08/07/23 (!) 142/95   04/03/23 118/70    (goal /80)      All Future Testing planned in [facility]:  Lab Frequency Next Occurrence   SARA BREANNE DIGITAL SCREEN BILATERAL Once 04/20/2023       Next Visit Date:  Future Appointments   Date Time Provider [address]   10/12/2023  4:20 PM Kylee April Might, APRN - CNP [facility]            Patient Active Problem List:     Tobacco abuse     Single kidney     Hyperlipidemia     Syncope

## 2023-12-28 ENCOUNTER — HOSPITAL ENCOUNTER (OUTPATIENT)
Age: 48
Discharge: HOME | End: 2023-12-28
Payer: COMMERCIAL

## 2023-12-28 LAB
ALBUMIN SERPL-MCNC: NORMAL G/DL (ref 3.5–5.2)
ALBUMIN/GLOB SERPL: NORMAL {RATIO} (ref 1–2.5)
ALP SERPL-CCNC: NORMAL U/L (ref 35–104)
ALT SERPL-CCNC: NORMAL U/L (ref 5–33)
ANION GAP SERPL CALCULATED.3IONS-SCNC: NORMAL MMOL/L (ref 9–17)
AST SERPL-CCNC: NORMAL U/L
BASOPHILS # BLD: 0.04 K/UL (ref 0–0.2)
BASOPHILS NFR BLD: 0 % (ref 0–2)
BILIRUB SERPL-MCNC: NORMAL MG/DL (ref 0.3–1.2)
BUN SERPL-MCNC: NORMAL MG/DL (ref 6–20)
BUN/CREAT SERPL: NORMAL (ref 9–20)
CALCIUM SERPL-MCNC: NORMAL MG/DL (ref 8.6–10.4)
CHLORIDE SERPL-SCNC: NORMAL MMOL/L (ref 98–107)
CHOLEST SERPL-MCNC: 171 MG/DL
CHOLESTEROL/HDL RATIO: 5.5
CO2 SERPL-SCNC: NORMAL MMOL/L (ref 20–31)
CREAT SERPL-MCNC: NORMAL MG/DL (ref 0.5–0.9)
EOSINOPHIL # BLD: 0.16 K/UL (ref 0–0.44)
EOSINOPHILS RELATIVE PERCENT: 2 % (ref 1–4)
ERYTHROCYTE [DISTWIDTH] IN BLOOD BY AUTOMATED COUNT: 12.6 % (ref 11.8–14.4)
GFR SERPL CREATININE-BSD FRML MDRD: NORMAL ML/MIN/1.73M2
GLUCOSE SERPL-MCNC: NORMAL MG/DL (ref 70–99)
HCT VFR BLD AUTO: 43.2 % (ref 36.3–47.1)
HDLC SERPL-MCNC: 31 MG/DL
HGB BLD-MCNC: 15.1 G/DL (ref 11.9–15.1)
IMM GRANULOCYTES # BLD AUTO: 0.07 K/UL (ref 0–0.3)
IMM GRANULOCYTES NFR BLD: 1 %
LDLC SERPL CALC-MCNC: 65 MG/DL (ref 0–130)
LYMPHOCYTES NFR BLD: 3.59 K/UL (ref 1.1–3.7)
LYMPHOCYTES RELATIVE PERCENT: 35 % (ref 24–43)
MCH RBC QN AUTO: 33.3 PG (ref 25.2–33.5)
MCHC RBC AUTO-ENTMCNC: 35 G/DL (ref 28.4–34.8)
MCV RBC AUTO: 95.2 FL (ref 82.6–102.9)
MONOCYTES NFR BLD: 0.57 K/UL (ref 0.1–1.2)
MONOCYTES NFR BLD: 6 % (ref 3–12)
NEUTROPHILS NFR BLD: 56 % (ref 36–65)
NEUTS SEG NFR BLD: 5.79 K/UL (ref 1.5–8.1)
NRBC BLD-RTO: 0 PER 100 WBC
PLATELET # BLD AUTO: 280 K/UL (ref 138–453)
PMV BLD AUTO: 10.9 FL (ref 8.1–13.5)
POTASSIUM SERPL-SCNC: NORMAL MMOL/L (ref 3.7–5.3)
PROT SERPL-MCNC: NORMAL G/DL (ref 6.4–8.3)
RBC # BLD AUTO: 4.54 M/UL (ref 3.95–5.11)
SODIUM SERPL-SCNC: NORMAL MMOL/L (ref 135–144)
TRIGL SERPL-MCNC: 373 MG/DL
TSH SERPL DL<=0.05 MIU/L-ACNC: 3.88 UIU/ML (ref 0.3–5)
WBC OTHER # BLD: 10.2 K/UL (ref 3.5–11.3)

## 2023-12-28 PROCEDURE — 36415 COLL VENOUS BLD VENIPUNCTURE: CPT

## 2023-12-28 PROCEDURE — 85025 COMPLETE CBC W/AUTO DIFF WBC: CPT

## 2023-12-28 PROCEDURE — 84443 ASSAY THYROID STIM HORMONE: CPT

## 2023-12-28 PROCEDURE — 80061 LIPID PANEL: CPT

## 2024-01-23 DIAGNOSIS — K21.9 GASTRO-ESOPHAGEAL REFLUX DISEASE WITHOUT ESOPHAGITIS: ICD-10-CM

## 2024-01-23 DIAGNOSIS — E11.9 TYPE 2 DIABETES MELLITUS WITHOUT COMPLICATION, WITHOUT LONG-TERM CURRENT USE OF INSULIN (HCC): ICD-10-CM

## 2024-01-23 RX ORDER — ASPIRIN 81 MG/1
TABLET ORAL
Qty: 90 TABLET | Refills: 1 | OUTPATIENT
Start: 2024-01-23

## 2024-01-23 RX ORDER — OMEPRAZOLE 40 MG/1
CAPSULE, DELAYED RELEASE ORAL
Qty: 90 CAPSULE | Refills: 1 | OUTPATIENT
Start: 2024-01-23

## 2024-01-23 RX ORDER — LISINOPRIL 2.5 MG/1
TABLET ORAL
Qty: 90 TABLET | Refills: 1 | OUTPATIENT
Start: 2024-01-23

## 2024-02-17 DIAGNOSIS — E11.9 TYPE 2 DIABETES MELLITUS WITHOUT COMPLICATION, WITHOUT LONG-TERM CURRENT USE OF INSULIN (HCC): ICD-10-CM

## 2024-02-17 DIAGNOSIS — K21.9 GASTRO-ESOPHAGEAL REFLUX DISEASE WITHOUT ESOPHAGITIS: ICD-10-CM

## 2024-02-19 RX ORDER — ASPIRIN 81 MG/1
TABLET ORAL
Qty: 30 TABLET | Refills: 0 | Status: SHIPPED | OUTPATIENT
Start: 2024-02-19 | End: 2024-03-19

## 2024-02-19 RX ORDER — OMEPRAZOLE 40 MG/1
CAPSULE, DELAYED RELEASE ORAL
Qty: 30 CAPSULE | Refills: 0 | Status: SHIPPED | OUTPATIENT
Start: 2024-02-19 | End: 2024-03-18 | Stop reason: SDUPTHER

## 2024-02-19 RX ORDER — SEMAGLUTIDE 0.68 MG/ML
INJECTION, SOLUTION SUBCUTANEOUS
COMMUNITY
Start: 2024-01-05

## 2024-02-19 RX ORDER — LISINOPRIL 2.5 MG/1
TABLET ORAL
Qty: 30 TABLET | Refills: 0 | Status: SHIPPED | OUTPATIENT
Start: 2024-02-19 | End: 2024-03-19

## 2024-02-19 RX ORDER — INSULIN ASPART 100 [IU]/ML
INJECTION, SOLUTION INTRAVENOUS; SUBCUTANEOUS
COMMUNITY
Start: 2023-12-20

## 2024-02-19 RX ORDER — PEN NEEDLE, DIABETIC 32GX 5/32"
NEEDLE, DISPOSABLE MISCELLANEOUS
COMMUNITY
Start: 2024-02-17

## 2024-02-19 NOTE — TELEPHONE ENCOUNTER
Health Maintenance   Topic Date Due    DTaP/Tdap/Td vaccine (1 - Tdap) Never done    Cervical cancer screen  Never done    Pneumococcal 0-64 years Vaccine (2 - PCV) 02/15/2019    Diabetic retinal exam  08/17/2021    Colorectal Cancer Screen  Never done    Diabetic foot exam  08/12/2022    Flu vaccine (1) 08/01/2023    COVID-19 Vaccine (4 - 2023-24 season) 09/01/2023    Diabetic Alb to Cr ratio (uACR) test  11/21/2023    A1C test (Diabetic or Prediabetic)  03/19/2024    Hepatitis B vaccine (1 of 3 - 3-dose series) 08/24/2024 (Originally 1975)    Hepatitis C screen  08/24/2024 (Originally 7/9/1995)    HIV screen  08/24/2024 (Originally 7/9/1992)    Depression Screen  08/24/2024    Lipids  12/28/2024    GFR test (Diabetes, CKD 3-4, OR last GFR 15-59)  12/28/2024    Hepatitis A vaccine  Aged Out    Hib vaccine  Aged Out    HPV vaccine  Aged Out    Polio vaccine  Aged Out    Meningococcal (ACWY) vaccine  Aged Out    Depression Monitoring  Discontinued             (applicable per patient's age: Cancer Screenings, Depression Screening, Fall Risk Screening, Immunizations)    Hemoglobin A1C (%)   Date Value   12/19/2023 10.6   03/20/2023 9.8   11/21/2022 9.1 (H)     LDL Cholesterol (mg/dL)   Date Value   12/28/2023 65     AST (U/L)   Date Value   12/28/2023 SPECIMEN GROSSLY HEMOLYZED     ALT (U/L)   Date Value   12/28/2023 SPECIMEN GROSSLY HEMOLYZED     BUN (mg/dL)   Date Value   12/28/2023 SPECIMEN GROSSLY HEMOLYZED      (goal A1C is < 7)   (goal LDL is <100) need 30-50% reduction from baseline     BP Readings from Last 3 Encounters:   08/24/23 118/76   08/07/23 (!) 142/95   04/03/23 118/70    (goal /80)      All Future Testing planned in [facility]:  Lab Frequency Next Occurrence   SARA BREANNE DIGITAL SCREEN BILATERAL Once 04/20/2023       Next Visit Date:  No future appointments.         Patient Active Problem List:     Tobacco abuse     Single kidney     Hyperlipidemia     Syncope     PCOS (polycystic ovarian

## 2024-03-03 DIAGNOSIS — J30.9 ALLERGIC RHINITIS, UNSPECIFIED: ICD-10-CM

## 2024-03-04 RX ORDER — MONTELUKAST SODIUM 10 MG/1
TABLET ORAL
Qty: 90 TABLET | Refills: 0 | Status: SHIPPED | OUTPATIENT
Start: 2024-03-04

## 2024-03-18 DIAGNOSIS — K21.9 GASTRO-ESOPHAGEAL REFLUX DISEASE WITHOUT ESOPHAGITIS: ICD-10-CM

## 2024-03-18 RX ORDER — OMEPRAZOLE 40 MG/1
40 CAPSULE, DELAYED RELEASE ORAL
Qty: 30 CAPSULE | Refills: 0 | Status: SHIPPED | OUTPATIENT
Start: 2024-03-18

## 2024-03-19 DIAGNOSIS — E11.9 TYPE 2 DIABETES MELLITUS WITHOUT COMPLICATION, WITHOUT LONG-TERM CURRENT USE OF INSULIN (HCC): ICD-10-CM

## 2024-03-19 RX ORDER — ASPIRIN 81 MG/1
TABLET ORAL
Qty: 30 TABLET | Refills: 0 | Status: SHIPPED | OUTPATIENT
Start: 2024-03-19

## 2024-03-19 RX ORDER — LISINOPRIL 2.5 MG/1
TABLET ORAL
Qty: 30 TABLET | Refills: 0 | Status: SHIPPED | OUTPATIENT
Start: 2024-03-19

## 2024-03-19 RX ORDER — ATORVASTATIN CALCIUM 40 MG/1
40 TABLET, FILM COATED ORAL DAILY
Qty: 30 TABLET | Refills: 0 | Status: SHIPPED | OUTPATIENT
Start: 2024-03-19

## 2024-03-19 NOTE — TELEPHONE ENCOUNTER
LVM for patient to schedule an appointment as patient has not been seen since August of 2023, 30 day supply pended.     Health Maintenance   Topic Date Due    DTaP/Tdap/Td vaccine (1 - Tdap) Never done    Cervical cancer screen  Never done    Pneumococcal 0-64 years Vaccine (2 of 2 - PCV) 02/15/2019    Diabetic retinal exam  08/17/2021    Colorectal Cancer Screen  Never done    Diabetic foot exam  08/12/2022    Flu vaccine (1) 08/01/2023    COVID-19 Vaccine (4 - 2023-24 season) 09/01/2023    Diabetic Alb to Cr ratio (uACR) test  11/21/2023    A1C test (Diabetic or Prediabetic)  03/19/2024    Hepatitis B vaccine (1 of 3 - 3-dose series) 08/24/2024 (Originally 1975)    Hepatitis C screen  08/24/2024 (Originally 7/9/1995)    HIV screen  08/24/2024 (Originally 7/9/1992)    Depression Screen  08/24/2024    Lipids  12/28/2024    GFR test (Diabetes, CKD 3-4, OR last GFR 15-59)  12/28/2024    Hepatitis A vaccine  Aged Out    Hib vaccine  Aged Out    HPV vaccine  Aged Out    Polio vaccine  Aged Out    Meningococcal (ACWY) vaccine  Aged Out    Depression Monitoring  Discontinued             (applicable per patient's age: Cancer Screenings, Depression Screening, Fall Risk Screening, Immunizations)    Hemoglobin A1C (%)   Date Value   12/19/2023 10.6   03/20/2023 9.8   11/21/2022 9.1 (H)     LDL Cholesterol (mg/dL)   Date Value   12/28/2023 65     AST (U/L)   Date Value   12/28/2023 SPECIMEN GROSSLY HEMOLYZED     ALT (U/L)   Date Value   12/28/2023 SPECIMEN GROSSLY HEMOLYZED     BUN (mg/dL)   Date Value   12/28/2023 SPECIMEN GROSSLY HEMOLYZED      (goal A1C is < 7)   (goal LDL is <100) need 30-50% reduction from baseline     BP Readings from Last 3 Encounters:   08/24/23 118/76   08/07/23 (!) 142/95   04/03/23 118/70    (goal /80)      All Future Testing planned in [facility]:  Lab Frequency Next Occurrence   SARA BREANNE DIGITAL SCREEN BILATERAL Once 04/20/2023       Next Visit Date:  No future appointments.

## 2024-04-01 RX ORDER — EMPAGLIFLOZIN 10 MG/1
10 TABLET, FILM COATED ORAL EVERY MORNING
COMMUNITY
Start: 2024-03-19

## 2024-04-01 RX ORDER — METOPROLOL SUCCINATE 25 MG/1
TABLET, EXTENDED RELEASE ORAL
Qty: 15 TABLET | Refills: 0 | Status: SHIPPED | OUTPATIENT
Start: 2024-04-01

## 2024-04-01 NOTE — TELEPHONE ENCOUNTER
(polycystic ovarian syndrome)     Gastroesophageal reflux disease     Class 2 obesity with body mass index (BMI) of 37.0 to 37.9 in adult     Uncontrolled type 2 diabetes mellitus with hyperglycemia (HCC)     Acute cystitis with hematuria

## 2024-04-15 DIAGNOSIS — K21.9 GASTRO-ESOPHAGEAL REFLUX DISEASE WITHOUT ESOPHAGITIS: ICD-10-CM

## 2024-04-15 RX ORDER — OMEPRAZOLE 40 MG/1
40 CAPSULE, DELAYED RELEASE ORAL
Qty: 30 CAPSULE | Refills: 0 | OUTPATIENT
Start: 2024-04-15

## 2024-05-06 RX ORDER — METOPROLOL SUCCINATE 25 MG/1
12.5 TABLET, EXTENDED RELEASE ORAL DAILY
Qty: 15 TABLET | Refills: 0 | OUTPATIENT
Start: 2024-05-06

## 2024-05-06 NOTE — TELEPHONE ENCOUNTER
Health Maintenance   Topic Date Due    DTaP/Tdap/Td vaccine (1 - Tdap) Never done    Cervical cancer screen  Never done    Pneumococcal 0-64 years Vaccine (2 of 2 - PCV) 02/15/2019    Diabetic retinal exam  08/17/2021    Colorectal Cancer Screen  Never done    Diabetic foot exam  08/12/2022    COVID-19 Vaccine (4 - 2023-24 season) 09/01/2023    Diabetic Alb to Cr ratio (uACR) test  11/21/2023    A1C test (Diabetic or Prediabetic)  03/19/2024    Hepatitis B vaccine (1 of 3 - 3-dose series) 08/24/2024 (Originally 1975)    Hepatitis C screen  08/24/2024 (Originally 7/9/1995)    HIV screen  08/24/2024 (Originally 7/9/1992)    Flu vaccine (Season Ended) 08/01/2024    Depression Screen  08/24/2024    Lipids  12/28/2024    GFR test (Diabetes, CKD 3-4, OR last GFR 15-59)  12/28/2024    Hepatitis A vaccine  Aged Out    Hib vaccine  Aged Out    HPV vaccine  Aged Out    Polio vaccine  Aged Out    Meningococcal (ACWY) vaccine  Aged Out    Depression Monitoring  Discontinued             (applicable per patient's age: Cancer Screenings, Depression Screening, Fall Risk Screening, Immunizations)    Hemoglobin A1C (%)   Date Value   12/19/2023 10.6   03/20/2023 9.8   11/21/2022 9.1 (H)     AST (U/L)   Date Value   12/28/2023 SPECIMEN GROSSLY HEMOLYZED     ALT (U/L)   Date Value   12/28/2023 SPECIMEN GROSSLY HEMOLYZED     BUN (mg/dL)   Date Value   12/28/2023 SPECIMEN GROSSLY HEMOLYZED      (goal A1C is < 7)   (goal LDL is <100) need 30-50% reduction from baseline     BP Readings from Last 3 Encounters:   08/24/23 118/76   08/07/23 (!) 142/95   04/03/23 118/70    (goal /80)      All Future Testing planned in [facility]:  Lab Frequency Next Occurrence   SARA BREANNE DIGITAL SCREEN BILATERAL Once 04/20/2023       Next Visit Date:  No future appointments.         Patient Active Problem List:     Tobacco abuse     Single kidney     Hyperlipidemia     Syncope     PCOS (polycystic ovarian syndrome)     Gastroesophageal reflux disease

## 2024-05-07 ENCOUNTER — TELEPHONE (OUTPATIENT)
Dept: PRIMARY CARE CLINIC | Age: 49
End: 2024-05-07

## 2024-05-14 ENCOUNTER — TELEPHONE (OUTPATIENT)
Dept: PRIMARY CARE CLINIC | Age: 49
End: 2024-05-14

## 2024-05-24 ENCOUNTER — OFFICE VISIT (OUTPATIENT)
Dept: PRIMARY CARE CLINIC | Age: 49
End: 2024-05-24
Payer: COMMERCIAL

## 2024-05-24 VITALS
HEART RATE: 82 BPM | SYSTOLIC BLOOD PRESSURE: 124 MMHG | TEMPERATURE: 98.5 F | DIASTOLIC BLOOD PRESSURE: 68 MMHG | OXYGEN SATURATION: 97 % | BODY MASS INDEX: 35.96 KG/M2 | RESPIRATION RATE: 18 BRPM | WEIGHT: 203 LBS

## 2024-05-24 DIAGNOSIS — Z12.31 VISIT FOR SCREENING MAMMOGRAM: ICD-10-CM

## 2024-05-24 DIAGNOSIS — Z79.4 CONTROLLED TYPE 2 DIABETES MELLITUS WITHOUT COMPLICATION, WITH LONG-TERM CURRENT USE OF INSULIN (HCC): Primary | ICD-10-CM

## 2024-05-24 DIAGNOSIS — K21.00 GASTROESOPHAGEAL REFLUX DISEASE WITH ESOPHAGITIS WITHOUT HEMORRHAGE: ICD-10-CM

## 2024-05-24 DIAGNOSIS — E11.9 CONTROLLED TYPE 2 DIABETES MELLITUS WITHOUT COMPLICATION, WITH LONG-TERM CURRENT USE OF INSULIN (HCC): Primary | ICD-10-CM

## 2024-05-24 DIAGNOSIS — Z12.11 SCREEN FOR COLON CANCER: ICD-10-CM

## 2024-05-24 PROBLEM — E11.65 UNCONTROLLED TYPE 2 DIABETES MELLITUS WITH HYPERGLYCEMIA (HCC): Status: RESOLVED | Noted: 2023-03-20 | Resolved: 2024-05-24

## 2024-05-24 LAB — HBA1C MFR BLD: 8.7 %

## 2024-05-24 PROCEDURE — 99214 OFFICE O/P EST MOD 30 MIN: CPT | Performed by: NURSE PRACTITIONER

## 2024-05-24 PROCEDURE — 3052F HG A1C>EQUAL 8.0%<EQUAL 9.0%: CPT | Performed by: NURSE PRACTITIONER

## 2024-05-24 PROCEDURE — 83036 HEMOGLOBIN GLYCOSYLATED A1C: CPT | Performed by: NURSE PRACTITIONER

## 2024-05-24 RX ORDER — FLUCONAZOLE 150 MG/1
150 TABLET ORAL ONCE
Qty: 1 TABLET | Refills: 0 | Status: SHIPPED | OUTPATIENT
Start: 2024-05-24 | End: 2024-05-24

## 2024-05-24 RX ORDER — CIMETIDINE 800 MG
800 TABLET ORAL NIGHTLY
Qty: 90 TABLET | Refills: 3 | Status: SHIPPED | OUTPATIENT
Start: 2024-05-24

## 2024-05-24 RX ORDER — PANTOPRAZOLE SODIUM 40 MG/1
40 TABLET, DELAYED RELEASE ORAL
Qty: 90 TABLET | Refills: 1 | Status: SHIPPED | OUTPATIENT
Start: 2024-05-24

## 2024-05-24 SDOH — ECONOMIC STABILITY: FOOD INSECURITY: WITHIN THE PAST 12 MONTHS, YOU WORRIED THAT YOUR FOOD WOULD RUN OUT BEFORE YOU GOT MONEY TO BUY MORE.: PATIENT DECLINED

## 2024-05-24 SDOH — ECONOMIC STABILITY: HOUSING INSECURITY
IN THE LAST 12 MONTHS, WAS THERE A TIME WHEN YOU DID NOT HAVE A STEADY PLACE TO SLEEP OR SLEPT IN A SHELTER (INCLUDING NOW)?: PATIENT DECLINED

## 2024-05-24 SDOH — ECONOMIC STABILITY: FOOD INSECURITY: WITHIN THE PAST 12 MONTHS, THE FOOD YOU BOUGHT JUST DIDN'T LAST AND YOU DIDN'T HAVE MONEY TO GET MORE.: PATIENT DECLINED

## 2024-05-24 SDOH — ECONOMIC STABILITY: INCOME INSECURITY: HOW HARD IS IT FOR YOU TO PAY FOR THE VERY BASICS LIKE FOOD, HOUSING, MEDICAL CARE, AND HEATING?: PATIENT DECLINED

## 2024-05-24 ASSESSMENT — ENCOUNTER SYMPTOMS
COUGH: 0
SORE THROAT: 0
VOMITING: 0
ABDOMINAL PAIN: 0
STRIDOR: 0
DIARRHEA: 0
HOARSE VOICE: 0
VISUAL CHANGE: 0
BELCHING: 1
GLOBUS SENSATION: 0
HEARTBURN: 1
SHORTNESS OF BREATH: 0
RHINORRHEA: 0
WATER BRASH: 1
WHEEZING: 0
NAUSEA: 0
CONSTIPATION: 0
CHOKING: 0

## 2024-05-24 ASSESSMENT — PATIENT HEALTH QUESTIONNAIRE - PHQ9
SUM OF ALL RESPONSES TO PHQ QUESTIONS 1-9: 0
SUM OF ALL RESPONSES TO PHQ9 QUESTIONS 1 & 2: 0
SUM OF ALL RESPONSES TO PHQ QUESTIONS 1-9: 0
2. FEELING DOWN, DEPRESSED OR HOPELESS: NOT AT ALL
1. LITTLE INTEREST OR PLEASURE IN DOING THINGS: NOT AT ALL

## 2024-05-24 NOTE — PROGRESS NOTES
lb)   SpO2 97%   BMI 35.96 kg/m²     Physical Exam  Vitals and nursing note reviewed.   Constitutional:       General: She is not in acute distress.     Appearance: Normal appearance. She is obese. She is not ill-appearing.   HENT:      Mouth/Throat:      Mouth: Mucous membranes are moist.      Pharynx: Oropharynx is clear.   Eyes:      General: No scleral icterus.     Conjunctiva/sclera: Conjunctivae normal.   Neck:      Vascular: No carotid bruit.   Cardiovascular:      Rate and Rhythm: Normal rate and regular rhythm.      Heart sounds: No murmur heard.  Pulmonary:      Effort: Pulmonary effort is normal.      Breath sounds: Normal breath sounds.   Abdominal:      General: Bowel sounds are normal. There is no distension.      Palpations: Abdomen is soft.      Tenderness: There is no abdominal tenderness.   Musculoskeletal:      Cervical back: Normal range of motion and neck supple.      Right lower leg: No edema.      Left lower leg: No edema.   Skin:     General: Skin is warm and dry.      Findings: No rash.   Neurological:      Mental Status: She is alert and oriented to person, place, and time.   Psychiatric:         Mood and Affect: Mood normal.         Behavior: Behavior normal.         Data:     Lab Results   Component Value Date/Time    NA SPECIMEN GROSSLY HEMOLYZED 12/28/2023 08:48 AM    K SPECIMEN GROSSLY HEMOLYZED 12/28/2023 08:48 AM    CL SPECIMEN GROSSLY HEMOLYZED 12/28/2023 08:48 AM    CO2 SPECIMEN GROSSLY HEMOLYZED 12/28/2023 08:48 AM    BUN SPECIMEN GROSSLY HEMOLYZED 12/28/2023 08:48 AM    CREATININE SPECIMEN GROSSLY HEMOLYZED 12/28/2023 08:48 AM    GLUCOSE SPECIMEN GROSSLY HEMOLYZED 12/28/2023 08:48 AM    BILITOT SPECIMEN GROSSLY HEMOLYZED 12/28/2023 08:48 AM    ALKPHOS SPECIMEN GROSSLY HEMOLYZED 12/28/2023 08:48 AM    AST SPECIMEN GROSSLY HEMOLYZED 12/28/2023 08:48 AM    ALT SPECIMEN GROSSLY HEMOLYZED 12/28/2023 08:48 AM     Lab Results   Component Value Date/Time    WBC 10.2 12/28/2023 08:48 AM

## 2024-06-06 DIAGNOSIS — J45.901 ALLERGIC BRONCHITIS WITH ACUTE EXACERBATION: ICD-10-CM

## 2024-06-06 DIAGNOSIS — J30.9 ALLERGIC RHINITIS, UNSPECIFIED: ICD-10-CM

## 2024-06-06 DIAGNOSIS — E11.9 TYPE 2 DIABETES MELLITUS WITHOUT COMPLICATION, WITHOUT LONG-TERM CURRENT USE OF INSULIN (HCC): ICD-10-CM

## 2024-06-06 RX ORDER — MONTELUKAST SODIUM 10 MG/1
10 TABLET ORAL DAILY
Qty: 90 TABLET | Refills: 3 | Status: SHIPPED | OUTPATIENT
Start: 2024-06-06

## 2024-06-06 RX ORDER — LISINOPRIL 2.5 MG/1
2.5 TABLET ORAL DAILY
Qty: 90 TABLET | Refills: 3 | Status: SHIPPED | OUTPATIENT
Start: 2024-06-06

## 2024-06-06 RX ORDER — METOPROLOL SUCCINATE 25 MG/1
12.5 TABLET, EXTENDED RELEASE ORAL DAILY
Qty: 45 TABLET | Refills: 3 | Status: SHIPPED | OUTPATIENT
Start: 2024-06-06

## 2024-06-06 RX ORDER — ALBUTEROL SULFATE 90 UG/1
2 AEROSOL, METERED RESPIRATORY (INHALATION) 4 TIMES DAILY PRN
Qty: 1 EACH | Refills: 3 | Status: SHIPPED | OUTPATIENT
Start: 2024-06-06

## 2024-06-06 RX ORDER — ASPIRIN 81 MG/1
TABLET ORAL
Qty: 90 TABLET | Refills: 3 | Status: SHIPPED | OUTPATIENT
Start: 2024-06-06

## 2024-06-06 RX ORDER — ATORVASTATIN CALCIUM 40 MG/1
40 TABLET, FILM COATED ORAL DAILY
Qty: 90 TABLET | Refills: 3 | Status: SHIPPED | OUTPATIENT
Start: 2024-06-06

## 2024-06-06 RX ORDER — METOPROLOL SUCCINATE 25 MG/1
TABLET, EXTENDED RELEASE ORAL
Qty: 15 TABLET | Refills: 0 | OUTPATIENT
Start: 2024-06-06

## 2024-06-06 NOTE — TELEPHONE ENCOUNTER
Health Maintenance   Topic Date Due    Breast cancer screen  03/08/2020    Colorectal Cancer Screen  Never done    Diabetic Alb to Cr ratio (uACR) test  11/21/2023    Hepatitis B vaccine (1 of 3 - 3-dose series) 08/24/2024 (Originally 1975)    Hepatitis C screen  08/24/2024 (Originally 7/9/1995)    HIV screen  08/24/2024 (Originally 7/9/1992)    Diabetic foot exam  05/24/2025 (Originally 8/12/2022)    DTaP/Tdap/Td vaccine (1 - Tdap) 05/24/2025 (Originally 7/9/1996)    Pneumococcal 0-64 years Vaccine (2 of 2 - PCV) 05/24/2025 (Originally 2/15/2019)    COVID-19 Vaccine (4 - 2023-24 season) 05/24/2025 (Originally 9/1/2023)    Cervical cancer screen  05/24/2025 (Originally 7/9/1998)    Diabetic retinal exam  06/03/2025 (Originally 8/17/2021)    Flu vaccine (Season Ended) 08/01/2024    Lipids  12/28/2024    GFR test (Diabetes, CKD 3-4, OR last GFR 15-59)  12/28/2024    A1C test (Diabetic or Prediabetic)  05/24/2025    Depression Screen  05/24/2025    Hepatitis A vaccine  Aged Out    Hib vaccine  Aged Out    HPV vaccine  Aged Out    Polio vaccine  Aged Out    Meningococcal (ACWY) vaccine  Aged Out    Depression Monitoring  Discontinued             (applicable per patient's age: Cancer Screenings, Depression Screening, Fall Risk Screening, Immunizations)    Hemoglobin A1C (%)   Date Value   05/24/2024 8.7   12/19/2023 10.6   03/20/2023 9.8     AST (U/L)   Date Value   12/28/2023 SPECIMEN GROSSLY HEMOLYZED     ALT (U/L)   Date Value   12/28/2023 SPECIMEN GROSSLY HEMOLYZED     BUN (mg/dL)   Date Value   12/28/2023 SPECIMEN GROSSLY HEMOLYZED      (goal A1C is < 7)   (goal LDL is <100) need 30-50% reduction from baseline     BP Readings from Last 3 Encounters:   05/24/24 124/68   08/24/23 118/76   08/07/23 (!) 142/95    (goal /80)      All Future Testing planned in [facility]:  Lab Frequency Next Occurrence   SARA BREANNE DIGITAL SCREEN BILATERAL Once 05/24/2024   CBC with Auto Differential Once 11/20/2024   ALT Once

## 2024-06-24 ENCOUNTER — TELEPHONE (OUTPATIENT)
Dept: PRIMARY CARE CLINIC | Age: 49
End: 2024-06-24

## 2024-06-26 DIAGNOSIS — K21.9 GASTRO-ESOPHAGEAL REFLUX DISEASE WITHOUT ESOPHAGITIS: ICD-10-CM

## 2024-06-26 RX ORDER — OMEPRAZOLE 40 MG/1
40 CAPSULE, DELAYED RELEASE ORAL
Qty: 30 CAPSULE | Refills: 0 | OUTPATIENT
Start: 2024-06-26

## 2024-07-23 ENCOUNTER — HOSPITAL ENCOUNTER (OUTPATIENT)
Dept: WOMENS IMAGING | Age: 49
Discharge: HOME | End: 2024-07-25
Payer: COMMERCIAL

## 2024-07-23 VITALS — WEIGHT: 200 LBS | BODY MASS INDEX: 36.8 KG/M2 | HEIGHT: 62 IN

## 2024-07-23 DIAGNOSIS — Z12.31 VISIT FOR SCREENING MAMMOGRAM: ICD-10-CM

## 2024-07-23 PROCEDURE — 77063 BREAST TOMOSYNTHESIS BI: CPT

## 2024-07-24 ENCOUNTER — TELEPHONE (OUTPATIENT)
Dept: PRIMARY CARE CLINIC | Age: 49
End: 2024-07-24

## 2024-07-24 NOTE — TELEPHONE ENCOUNTER
----- Message from RONN Parr CNP sent at 7/23/2024  8:56 PM EDT -----  Results are normal, please call patient and make them aware.

## 2024-09-15 DIAGNOSIS — E11.65 UNCONTROLLED TYPE 2 DIABETES MELLITUS WITH HYPERGLYCEMIA (HCC): ICD-10-CM

## 2024-09-15 DIAGNOSIS — K21.00 GASTROESOPHAGEAL REFLUX DISEASE WITH ESOPHAGITIS WITHOUT HEMORRHAGE: ICD-10-CM

## 2024-09-15 RX ORDER — PANTOPRAZOLE SODIUM 40 MG/1
40 TABLET, DELAYED RELEASE ORAL
Qty: 90 TABLET | Refills: 1 | Status: SHIPPED | OUTPATIENT
Start: 2024-09-15

## 2024-09-15 RX ORDER — GLIMEPIRIDE 4 MG/1
4 TABLET ORAL
Qty: 90 TABLET | Refills: 3 | Status: SHIPPED | OUTPATIENT
Start: 2024-09-15

## 2024-11-19 ENCOUNTER — TELEPHONE (OUTPATIENT)
Dept: PRIMARY CARE CLINIC | Age: 49
End: 2024-11-19

## 2024-11-26 ENCOUNTER — OFFICE VISIT (OUTPATIENT)
Dept: PRIMARY CARE CLINIC | Age: 49
End: 2024-11-26
Payer: COMMERCIAL

## 2024-11-26 VITALS
WEIGHT: 205.2 LBS | HEART RATE: 104 BPM | SYSTOLIC BLOOD PRESSURE: 126 MMHG | OXYGEN SATURATION: 95 % | BODY MASS INDEX: 37.53 KG/M2 | DIASTOLIC BLOOD PRESSURE: 72 MMHG | TEMPERATURE: 98 F

## 2024-11-26 DIAGNOSIS — J20.9 ACUTE BRONCHITIS, UNSPECIFIED ORGANISM: ICD-10-CM

## 2024-11-26 DIAGNOSIS — E11.65 UNCONTROLLED TYPE 2 DIABETES MELLITUS WITH HYPERGLYCEMIA (HCC): Primary | ICD-10-CM

## 2024-11-26 DIAGNOSIS — J01.40 ACUTE NON-RECURRENT PANSINUSITIS: ICD-10-CM

## 2024-11-26 LAB
HBA1C MFR BLD: 10.2 %
INFLUENZA A ANTIGEN, POC: NEGATIVE
INFLUENZA B ANTIGEN, POC: NEGATIVE
LOT NUMBER POC: NORMAL
SARS-COV-2 RNA POC - COV: NORMAL
VALID INTERNAL CONTROL, POC: PRESENT
VENDOR AND KIT NAME POC: NORMAL

## 2024-11-26 PROCEDURE — 99214 OFFICE O/P EST MOD 30 MIN: CPT | Performed by: NURSE PRACTITIONER

## 2024-11-26 PROCEDURE — 3046F HEMOGLOBIN A1C LEVEL >9.0%: CPT | Performed by: NURSE PRACTITIONER

## 2024-11-26 PROCEDURE — 83036 HEMOGLOBIN GLYCOSYLATED A1C: CPT | Performed by: NURSE PRACTITIONER

## 2024-11-26 RX ORDER — DOXYCYCLINE HYCLATE 100 MG
100 TABLET ORAL 2 TIMES DAILY
Qty: 20 TABLET | Refills: 0 | Status: SHIPPED | OUTPATIENT
Start: 2024-11-26 | End: 2024-12-06

## 2024-11-26 RX ORDER — BENZONATATE 200 MG/1
200 CAPSULE ORAL 3 TIMES DAILY PRN
Qty: 30 CAPSULE | Refills: 0 | Status: SHIPPED | OUTPATIENT
Start: 2024-11-26 | End: 2024-12-06

## 2024-11-26 SDOH — ECONOMIC STABILITY: FOOD INSECURITY: WITHIN THE PAST 12 MONTHS, THE FOOD YOU BOUGHT JUST DIDN'T LAST AND YOU DIDN'T HAVE MONEY TO GET MORE.: NEVER TRUE

## 2024-11-26 SDOH — ECONOMIC STABILITY: FOOD INSECURITY: WITHIN THE PAST 12 MONTHS, YOU WORRIED THAT YOUR FOOD WOULD RUN OUT BEFORE YOU GOT MONEY TO BUY MORE.: NEVER TRUE

## 2024-11-26 SDOH — ECONOMIC STABILITY: INCOME INSECURITY: HOW HARD IS IT FOR YOU TO PAY FOR THE VERY BASICS LIKE FOOD, HOUSING, MEDICAL CARE, AND HEATING?: NOT HARD AT ALL

## 2024-11-26 ASSESSMENT — ENCOUNTER SYMPTOMS
TROUBLE SWALLOWING: 0
HEMOPTYSIS: 0
SWOLLEN GLANDS: 0
SINUS COMPLAINT: 1
CHOKING: 0
DIARRHEA: 0
COUGH: 1
ABDOMINAL PAIN: 0
VOMITING: 0
HOARSE VOICE: 0
RHINORRHEA: 1
VISUAL CHANGE: 0
SINUS PRESSURE: 1
SORE THROAT: 1
SINUS PAIN: 1
CONSTIPATION: 0
NAUSEA: 0
WHEEZING: 0
HEARTBURN: 0
SHORTNESS OF BREATH: 0

## 2024-11-26 ASSESSMENT — PATIENT HEALTH QUESTIONNAIRE - PHQ9
SUM OF ALL RESPONSES TO PHQ9 QUESTIONS 1 & 2: 0
SUM OF ALL RESPONSES TO PHQ QUESTIONS 1-9: 0
1. LITTLE INTEREST OR PLEASURE IN DOING THINGS: NOT AT ALL
SUM OF ALL RESPONSES TO PHQ QUESTIONS 1-9: 0
2. FEELING DOWN, DEPRESSED OR HOPELESS: NOT AT ALL
SUM OF ALL RESPONSES TO PHQ QUESTIONS 1-9: 0
SUM OF ALL RESPONSES TO PHQ QUESTIONS 1-9: 0

## 2024-11-26 NOTE — PROGRESS NOTES
Findings: No rash.   Neurological:      Mental Status: She is alert and oriented to person, place, and time.   Psychiatric:         Mood and Affect: Mood normal.         Behavior: Behavior normal.         Data:     Lab Results   Component Value Date/Time    NA SPECIMEN GROSSLY HEMOLYZED 12/28/2023 08:48 AM    K SPECIMEN GROSSLY HEMOLYZED 12/28/2023 08:48 AM    CL SPECIMEN GROSSLY HEMOLYZED 12/28/2023 08:48 AM    CO2 SPECIMEN GROSSLY HEMOLYZED 12/28/2023 08:48 AM    BUN SPECIMEN GROSSLY HEMOLYZED 12/28/2023 08:48 AM    CREATININE SPECIMEN GROSSLY HEMOLYZED 12/28/2023 08:48 AM    GLUCOSE SPECIMEN GROSSLY HEMOLYZED 12/28/2023 08:48 AM    BILITOT SPECIMEN GROSSLY HEMOLYZED 12/28/2023 08:48 AM    ALKPHOS SPECIMEN GROSSLY HEMOLYZED 12/28/2023 08:48 AM    AST SPECIMEN GROSSLY HEMOLYZED 12/28/2023 08:48 AM    ALT SPECIMEN GROSSLY HEMOLYZED 12/28/2023 08:48 AM     Lab Results   Component Value Date/Time    WBC 10.2 12/28/2023 08:48 AM    RBC 4.54 12/28/2023 08:48 AM    HGB 15.1 12/28/2023 08:48 AM    HCT 43.2 12/28/2023 08:48 AM    MCV 95.2 12/28/2023 08:48 AM    MCH 33.3 12/28/2023 08:48 AM    MCHC 35.0 12/28/2023 08:48 AM    RDW 12.6 12/28/2023 08:48 AM     12/28/2023 08:48 AM    MPV 10.9 12/28/2023 08:48 AM     Lab Results   Component Value Date/Time    TSH 3.88 12/28/2023 08:48 AM     Lab Results   Component Value Date/Time    CHOL 171 12/28/2023 08:48 AM    LDL 65 12/28/2023 08:48 AM    HDL 31 12/28/2023 08:48 AM    LABA1C 10.2 11/26/2024 08:22 AM       Assessment/Plan:      Diagnosis Orders   1. Uncontrolled type 2 diabetes mellitus with hyperglycemia (HCC)  POCT glycosylated hemoglobin (Hb A1C)    SITagliptin (JANUVIA) 100 MG tablet    Insulin Degludec 200 UNIT/ML SOPN    CBC with Auto Differential    ALT    AST    Basic Metabolic Panel    Lipid Panel    Microalbumin, Ur      2. Acute non-recurrent pansinusitis  POC COVID-19, FLU A/B    doxycycline hyclate (VIBRA-TABS) 100 MG tablet      3. Acute bronchitis,

## 2025-03-14 DIAGNOSIS — K21.00 GASTROESOPHAGEAL REFLUX DISEASE WITH ESOPHAGITIS WITHOUT HEMORRHAGE: ICD-10-CM

## 2025-03-14 RX ORDER — CIMETIDINE 800 MG
800 TABLET ORAL NIGHTLY
Qty: 90 TABLET | Refills: 3 | OUTPATIENT
Start: 2025-03-14

## 2025-04-21 ENCOUNTER — TELEPHONE (OUTPATIENT)
Dept: PRIMARY CARE CLINIC | Age: 50
End: 2025-04-21

## 2025-04-21 RX ORDER — SAXAGLIPTIN 5 MG/1
5 TABLET, FILM COATED ORAL DAILY
Qty: 90 TABLET | Refills: 3 | Status: SHIPPED | OUTPATIENT
Start: 2025-04-21

## 2025-04-21 NOTE — TELEPHONE ENCOUNTER
See copied response from prior auth denial for Januvia.   Full letter scanned into chart.      Why your request was denied:  Your plan only covers this drug when you meet one of these options: A) You have tried other drugs  your plan covers (preferred drugs), and they did not work well for you, or B) Your doctor gives us a  medical reason you cannot take those other drugs. For your plan, you may need to try up to three  preferred drugs. We have denied your request because you do not meet any of these conditions. We  reviewed the information we had. Your request has been denied. Your doctor can send us any new or  missing information for us to review. The preferred drugs for your plan are: saxagliptin, ZITUVIO.  (Requirement: 3 in a class with 3 or more alternatives, 2 in a class with 2 alternatives, or 1 in a class  with only 1 alternative.). Your doctor may need to get approval from your plan for preferred drugs. For  this drug, you may have to meet other criteria. You can request the drug policy for more details. You  can also request other plan documents for your review.

## 2025-05-19 ENCOUNTER — TELEPHONE (OUTPATIENT)
Dept: PRIMARY CARE CLINIC | Age: 50
End: 2025-05-19

## 2025-05-27 ENCOUNTER — OFFICE VISIT (OUTPATIENT)
Dept: PRIMARY CARE CLINIC | Age: 50
End: 2025-05-27
Payer: COMMERCIAL

## 2025-05-27 VITALS
HEART RATE: 94 BPM | OXYGEN SATURATION: 95 % | SYSTOLIC BLOOD PRESSURE: 130 MMHG | DIASTOLIC BLOOD PRESSURE: 80 MMHG | TEMPERATURE: 98 F | WEIGHT: 196.4 LBS | BODY MASS INDEX: 35.92 KG/M2

## 2025-05-27 DIAGNOSIS — Z12.11 COLON CANCER SCREENING: ICD-10-CM

## 2025-05-27 DIAGNOSIS — J44.9 COPD, MILD (HCC): ICD-10-CM

## 2025-05-27 DIAGNOSIS — E11.65 UNCONTROLLED TYPE 2 DIABETES MELLITUS WITH HYPERGLYCEMIA (HCC): Primary | ICD-10-CM

## 2025-05-27 DIAGNOSIS — K04.7 DENTAL INFECTION: ICD-10-CM

## 2025-05-27 DIAGNOSIS — K21.00 GASTROESOPHAGEAL REFLUX DISEASE WITH ESOPHAGITIS WITHOUT HEMORRHAGE: ICD-10-CM

## 2025-05-27 LAB — HBA1C MFR BLD: 10.1 %

## 2025-05-27 PROCEDURE — 99214 OFFICE O/P EST MOD 30 MIN: CPT | Performed by: NURSE PRACTITIONER

## 2025-05-27 PROCEDURE — 83036 HEMOGLOBIN GLYCOSYLATED A1C: CPT | Performed by: NURSE PRACTITIONER

## 2025-05-27 PROCEDURE — 3046F HEMOGLOBIN A1C LEVEL >9.0%: CPT | Performed by: NURSE PRACTITIONER

## 2025-05-27 RX ORDER — CIMETIDINE 800 MG
800 TABLET ORAL NIGHTLY
Qty: 90 TABLET | Refills: 3 | Status: SHIPPED | OUTPATIENT
Start: 2025-05-27

## 2025-05-27 RX ORDER — MONTELUKAST SODIUM 10 MG/1
10 TABLET ORAL DAILY
Qty: 90 TABLET | Refills: 3 | Status: SHIPPED | OUTPATIENT
Start: 2025-05-27

## 2025-05-27 RX ORDER — PANTOPRAZOLE SODIUM 40 MG/1
40 TABLET, DELAYED RELEASE ORAL
Qty: 90 TABLET | Refills: 3 | Status: SHIPPED | OUTPATIENT
Start: 2025-05-27

## 2025-05-27 RX ORDER — LISINOPRIL 2.5 MG/1
2.5 TABLET ORAL DAILY
Qty: 90 TABLET | Refills: 3 | Status: SHIPPED | OUTPATIENT
Start: 2025-05-27

## 2025-05-27 RX ORDER — SAXAGLIPTIN 5 MG/1
5 TABLET, FILM COATED ORAL DAILY
Qty: 90 TABLET | Refills: 3 | Status: SHIPPED | OUTPATIENT
Start: 2025-05-27

## 2025-05-27 RX ORDER — CLINDAMYCIN HYDROCHLORIDE 150 MG/1
150 CAPSULE ORAL 3 TIMES DAILY
Qty: 21 CAPSULE | Refills: 0 | Status: SHIPPED | OUTPATIENT
Start: 2025-05-27 | End: 2025-06-03

## 2025-05-27 RX ORDER — BUDESONIDE AND FORMOTEROL FUMARATE DIHYDRATE 80; 4.5 UG/1; UG/1
2 AEROSOL RESPIRATORY (INHALATION) 2 TIMES DAILY
Qty: 10.2 G | Refills: 3 | Status: SHIPPED | OUTPATIENT
Start: 2025-05-27

## 2025-05-27 SDOH — ECONOMIC STABILITY: TRANSPORTATION INSECURITY
IN THE PAST 12 MONTHS, HAS LACK OF TRANSPORTATION KEPT YOU FROM MEETINGS, WORK, OR FROM GETTING THINGS NEEDED FOR DAILY LIVING?: NO

## 2025-05-27 SDOH — ECONOMIC STABILITY: FOOD INSECURITY: WITHIN THE PAST 12 MONTHS, YOU WORRIED THAT YOUR FOOD WOULD RUN OUT BEFORE YOU GOT MONEY TO BUY MORE.: NEVER TRUE

## 2025-05-27 SDOH — ECONOMIC STABILITY: FOOD INSECURITY: WITHIN THE PAST 12 MONTHS, THE FOOD YOU BOUGHT JUST DIDN'T LAST AND YOU DIDN'T HAVE MONEY TO GET MORE.: NEVER TRUE

## 2025-05-27 SDOH — ECONOMIC STABILITY: INCOME INSECURITY: IN THE LAST 12 MONTHS, WAS THERE A TIME WHEN YOU WERE NOT ABLE TO PAY THE MORTGAGE OR RENT ON TIME?: NO

## 2025-05-27 SDOH — ECONOMIC STABILITY: TRANSPORTATION INSECURITY
IN THE PAST 12 MONTHS, HAS THE LACK OF TRANSPORTATION KEPT YOU FROM MEDICAL APPOINTMENTS OR FROM GETTING MEDICATIONS?: NO

## 2025-05-27 ASSESSMENT — ENCOUNTER SYMPTOMS
WHEEZING: 0
WATER BRASH: 0
CHOKING: 0
FREQUENT THROAT CLEARING: 0
HEMOPTYSIS: 0
NAUSEA: 0
VISUAL CHANGE: 0
CONSTIPATION: 0
TROUBLE SWALLOWING: 0
SPUTUM PRODUCTION: 1
CHEST TIGHTNESS: 0
BELCHING: 0
HOARSE VOICE: 0
ABDOMINAL PAIN: 0
SHORTNESS OF BREATH: 0
SORE THROAT: 0
COUGH: 1
DIFFICULTY BREATHING: 0
STRIDOR: 0
GLOBUS SENSATION: 0
SINUS PRESSURE: 0
HEARTBURN: 1
VOMITING: 0
RHINORRHEA: 0
DIARRHEA: 0

## 2025-05-27 ASSESSMENT — PATIENT HEALTH QUESTIONNAIRE - PHQ9
1. LITTLE INTEREST OR PLEASURE IN DOING THINGS: SEVERAL DAYS
SUM OF ALL RESPONSES TO PHQ QUESTIONS 1-9: 2
1. LITTLE INTEREST OR PLEASURE IN DOING THINGS: SEVERAL DAYS
2. FEELING DOWN, DEPRESSED OR HOPELESS: SEVERAL DAYS
2. FEELING DOWN, DEPRESSED OR HOPELESS: SEVERAL DAYS
SUM OF ALL RESPONSES TO PHQ QUESTIONS 1-9: 2
SUM OF ALL RESPONSES TO PHQ9 QUESTIONS 1 & 2: 2

## 2025-05-27 ASSESSMENT — COPD QUESTIONNAIRES: COPD: 1

## 2025-05-27 NOTE — PROGRESS NOTES
Name: Elida Montez  : 1975         Chief Complaint:     Chief Complaint   Patient presents with    Diabetes     6 month.     Gastroesophageal Reflux    COPD    Dental Pain       History of Present Illness:      Elida Montez is a 49 y.o.  female who presents with Diabetes (6 month. ), Gastroesophageal Reflux, COPD, and Dental Pain      Elida is here today for routine office visit.     She states she is out of some of her medications and that is probably why her A1c is elevated.  We did receive a notice that sitagliptin was no longer covered and they were requesting saxagliptin.  She states that was also denied.  We will need to check with the insurance company.  Otherwise she states she has been taking her insulin and metformin.    She also states she would like to start a long-term maintenance inhaler.  She believes that would help her COPD.  She is having more episodes of using the rescue inhaler.  See below for further comments.    Dental pain-new, patient states she does have a bad tooth on the lower right side of her mouth.  She is requesting antibiotics before she can get to the dentist.    Diabetes  She presents for her follow-up diabetic visit. She has type 2 diabetes mellitus. No MedicAlert identification noted. Onset time: YEARS. Her disease course has been worsening. There are no hypoglycemic associated symptoms. Pertinent negatives for hypoglycemia include no dizziness, headaches, nervousness/anxiousness, pallor, seizures, sleepiness, speech difficulty or sweats. There are no diabetic associated symptoms. Pertinent negatives for diabetes include no chest pain, no fatigue, no foot paresthesias, no polydipsia, no polyphagia, no polyuria, no visual change and no weight loss. There are no hypoglycemic complications. Symptoms are stable. Pertinent negatives for diabetic complications include no CVA, heart disease, nephropathy or peripheral neuropathy. Risk factors for coronary

## 2025-05-27 NOTE — PATIENT INSTRUCTIONS
SURVEY:     You may be receiving a survey from [facility] regarding your visit today.     Please complete the survey to enable us to provide the highest quality of care to you and your family.     If you cannot score us a very good on any question, please call the office to discuss how we could have made your experience a very good one.     Thank you,    Ramirez Lindsay, APRN-CNP  Jacinta Michelle, APRN-CNP  Nikki, LPN  Kathy, CMA  Clinton, CMA  Luly, CMA  Ankita, PCA  Yaneth, CMA  Tamiko, PM

## 2025-06-15 DIAGNOSIS — E11.9 TYPE 2 DIABETES MELLITUS WITHOUT COMPLICATION, WITHOUT LONG-TERM CURRENT USE OF INSULIN (HCC): ICD-10-CM

## 2025-06-16 RX ORDER — ATORVASTATIN CALCIUM 40 MG/1
40 TABLET, FILM COATED ORAL DAILY
Qty: 90 TABLET | Refills: 3 | Status: SHIPPED | OUTPATIENT
Start: 2025-06-16

## 2025-06-16 RX ORDER — ASPIRIN 81 MG/1
81 TABLET, COATED ORAL DAILY
Qty: 90 TABLET | Refills: 3 | Status: SHIPPED | OUTPATIENT
Start: 2025-06-16

## 2025-06-16 RX ORDER — METOPROLOL SUCCINATE 25 MG/1
12.5 TABLET, EXTENDED RELEASE ORAL DAILY
Qty: 45 TABLET | Refills: 3 | Status: SHIPPED | OUTPATIENT
Start: 2025-06-16

## 2025-06-21 LAB — NONINV COLON CA DNA+OCC BLD SCRN STL QL: POSITIVE

## 2025-06-23 ENCOUNTER — RESULTS FOLLOW-UP (OUTPATIENT)
Dept: PRIMARY CARE CLINIC | Age: 50
End: 2025-06-23

## 2025-06-23 DIAGNOSIS — R19.5 POSITIVE COLORECTAL CANCER SCREENING USING COLOGUARD TEST: Primary | ICD-10-CM

## 2025-06-24 NOTE — TELEPHONE ENCOUNTER
----- Message from RONN Chavez CNP sent at 6/23/2025  4:05 PM EDT -----  Cologuard test is positive.  Recommend colonoscopy.  Referral placed.  Thank you.

## 2025-06-24 NOTE — TELEPHONE ENCOUNTER
Patient notified and verbalized understanding. Patient was transferred to General Surgery to schedule.

## 2025-07-07 ENCOUNTER — HOSPITAL ENCOUNTER (OUTPATIENT)
Dept: GENERAL RADIOLOGY | Age: 50
Discharge: HOME | End: 2025-07-09
Payer: COMMERCIAL

## 2025-07-07 ENCOUNTER — OFFICE VISIT (OUTPATIENT)
Dept: SURGERY | Age: 50
End: 2025-07-07
Payer: COMMERCIAL

## 2025-07-07 ENCOUNTER — TELEPHONE (OUTPATIENT)
Dept: SURGERY | Age: 50
End: 2025-07-07

## 2025-07-07 ENCOUNTER — HOSPITAL ENCOUNTER (OUTPATIENT)
Age: 50
Discharge: HOME | End: 2025-07-07
Payer: COMMERCIAL

## 2025-07-07 VITALS
DIASTOLIC BLOOD PRESSURE: 80 MMHG | SYSTOLIC BLOOD PRESSURE: 118 MMHG | BODY MASS INDEX: 36.25 KG/M2 | WEIGHT: 197 LBS | HEART RATE: 91 BPM | HEIGHT: 62 IN | OXYGEN SATURATION: 96 % | RESPIRATION RATE: 18 BRPM

## 2025-07-07 DIAGNOSIS — R06.2 WHEEZING: ICD-10-CM

## 2025-07-07 DIAGNOSIS — E11.65 UNCONTROLLED TYPE 2 DIABETES MELLITUS WITH HYPERGLYCEMIA (HCC): ICD-10-CM

## 2025-07-07 DIAGNOSIS — Z72.0 TOBACCO ABUSE: ICD-10-CM

## 2025-07-07 DIAGNOSIS — R19.5 POSITIVE COLORECTAL CANCER SCREENING USING COLOGUARD TEST: Primary | ICD-10-CM

## 2025-07-07 LAB
ALT SERPL-CCNC: 38 U/L (ref 10–35)
ANION GAP SERPL CALCULATED.3IONS-SCNC: 12 MMOL/L (ref 9–16)
AST SERPL-CCNC: 28 U/L (ref 10–35)
BASOPHILS # BLD: 0.06 K/UL (ref 0–0.2)
BASOPHILS NFR BLD: 1 % (ref 0–2)
BUN SERPL-MCNC: 14 MG/DL (ref 6–20)
BUN/CREAT SERPL: 23 (ref 9–20)
CALCIUM SERPL-MCNC: 8.8 MG/DL (ref 8.6–10.4)
CHLORIDE SERPL-SCNC: 100 MMOL/L (ref 98–107)
CHOLEST SERPL-MCNC: 327 MG/DL (ref 0–199)
CHOLESTEROL/HDL RATIO: 20.4
CO2 SERPL-SCNC: 19 MMOL/L (ref 20–31)
CREAT SERPL-MCNC: 0.6 MG/DL (ref 0.5–0.9)
CREAT UR-MCNC: 85.9 MG/DL (ref 28–217)
EOSINOPHIL # BLD: 0.32 K/UL (ref 0–0.44)
EOSINOPHILS RELATIVE PERCENT: 4 % (ref 1–4)
ERYTHROCYTE [DISTWIDTH] IN BLOOD BY AUTOMATED COUNT: 13.7 % (ref 11.8–14.4)
GFR, ESTIMATED: >90 ML/MIN/1.73M2
GLUCOSE SERPL-MCNC: 269 MG/DL (ref 74–99)
HCT VFR BLD AUTO: 41.1 % (ref 36.3–47.1)
HDLC SERPL-MCNC: 16 MG/DL
HGB BLD-MCNC: 14.4 G/DL (ref 11.9–15.1)
IMM GRANULOCYTES # BLD AUTO: 0.06 K/UL (ref 0–0.3)
IMM GRANULOCYTES NFR BLD: 1 %
LDLC SERPL CALC-MCNC: ABNORMAL MG/DL (ref 0–100)
LDLC SERPL DIRECT ASSAY-MCNC: 50 MG/DL
LYMPHOCYTES NFR BLD: 3.91 K/UL (ref 1.1–3.7)
LYMPHOCYTES RELATIVE PERCENT: 46 % (ref 24–43)
MCH RBC QN AUTO: 34 PG (ref 25.2–33.5)
MCHC RBC AUTO-ENTMCNC: 35 G/DL (ref 28.4–34.8)
MCV RBC AUTO: 97.2 FL (ref 82.6–102.9)
MICROALBUMIN UR-MCNC: <12 MG/L (ref 0–20)
MICROALBUMIN/CREAT UR-RTO: NORMAL MCG/MG CREAT (ref 0–25)
MONOCYTES NFR BLD: 0.44 K/UL (ref 0.1–1.2)
MONOCYTES NFR BLD: 5 % (ref 3–12)
NEUTROPHILS NFR BLD: 43 % (ref 36–65)
NEUTS SEG NFR BLD: 3.63 K/UL (ref 1.5–8.1)
NRBC BLD-RTO: 0 PER 100 WBC
PLATELET # BLD AUTO: 236 K/UL (ref 138–453)
PMV BLD AUTO: 10.3 FL (ref 8.1–13.5)
POTASSIUM SERPL-SCNC: 4.2 MMOL/L (ref 3.7–5.3)
RBC # BLD AUTO: 4.23 M/UL (ref 3.95–5.11)
SODIUM SERPL-SCNC: 131 MMOL/L (ref 136–145)
TRIGL SERPL-MCNC: 2584 MG/DL
VLDLC SERPL CALC-MCNC: ABNORMAL MG/DL (ref 1–30)
WBC OTHER # BLD: 8.4 K/UL (ref 3.5–11.3)

## 2025-07-07 PROCEDURE — 36415 COLL VENOUS BLD VENIPUNCTURE: CPT

## 2025-07-07 PROCEDURE — 80048 BASIC METABOLIC PNL TOTAL CA: CPT

## 2025-07-07 PROCEDURE — 85025 COMPLETE CBC W/AUTO DIFF WBC: CPT

## 2025-07-07 PROCEDURE — 83721 ASSAY OF BLOOD LIPOPROTEIN: CPT

## 2025-07-07 PROCEDURE — 71046 X-RAY EXAM CHEST 2 VIEWS: CPT

## 2025-07-07 PROCEDURE — 80061 LIPID PANEL: CPT

## 2025-07-07 PROCEDURE — 82043 UR ALBUMIN QUANTITATIVE: CPT

## 2025-07-07 PROCEDURE — 84460 ALANINE AMINO (ALT) (SGPT): CPT

## 2025-07-07 PROCEDURE — 99203 OFFICE O/P NEW LOW 30 MIN: CPT | Performed by: SURGERY

## 2025-07-07 PROCEDURE — 84450 TRANSFERASE (AST) (SGOT): CPT

## 2025-07-07 PROCEDURE — 82570 ASSAY OF URINE CREATININE: CPT

## 2025-07-07 PROCEDURE — 3046F HEMOGLOBIN A1C LEVEL >9.0%: CPT | Performed by: SURGERY

## 2025-07-07 NOTE — PATIENT INSTRUCTIONS
SURVEY:    You may be receiving a survey from [facility] your visit today.    You may get this in the mail, through your MyChart, or in your email.     Please complete the survey to enable us to provide the highest quality of care to you and your family.    If you cannot score us a very good (5 Stars) on any question, please call the office to discuss how we could of made your experience exceptional.    Thank you!    General Surgery    MD Dr. Yoly Angel, DO  Dr. Andreas Ayon, DO  Lilian Dickens, RONN-CNP    Pain Mgmt.  Dr. Philip Black, DO  NILESH Raygoza, ROSALINE Kirkpatrick LPN Jena Adams, MA Emily Akers, MA    Phone: [phone]  Fax: [phone]    Office Hours:   M-TH 8-5, F: 8-12

## 2025-07-07 NOTE — TELEPHONE ENCOUNTER
Patient scheduled for colonoscopy on 9/12/25 with Dr. Betts. Written prep instructions reviewed with patient. Questions asked and answered. Patient also advised to read instructions again 1 week prior to the procedure and to call the office with any questions. Surgery department calls the day prior to the procedure to give time of arrival. Also made aware that the PAT department will reach out to review medical history and medication instructions about a week prior to procedure date. Case request sent

## 2025-07-07 NOTE — PROGRESS NOTES
Family history of colon cancer: no  Blood in stool: no  Unintentional weight loss: Reports 10 lb weight loss in past 6 mos., denies change in activity or diet.  Abdominal pain: no  Prior colonoscopy: no  Prior EGD: no  Constipation history: no  Number of bowel movements a day: Reports BM every couple of days. Reports stools are soft/loose since starting metformin 7 yrs. ago.   
MG EC tablet TAKE 1 TABLET BY MOUTH EVERY DAY 6/16/25  Yes Ramirez Lindsay APRN - CNP   atorvastatin (LIPITOR) 40 MG tablet TAKE 1 TABLET BY MOUTH EVERY DAY 6/16/25  Yes Ramirez Lindsay APRN - CNP   metoprolol succinate (TOPROL XL) 25 MG extended release tablet TAKE 1/2 TABLET BY MOUTH DAILY 6/16/25  Yes Ramirez Lindsay APRN - CNP   cimetidine (TAGAMET) 800 MG tablet Take 1 tablet by mouth nightly 5/27/25  Ramirez Feliciano APRN - CNP   montelukast (SINGULAIR) 10 MG tablet Take 1 tablet by mouth daily 5/27/25  Ramirez Feliciano APRN - CNP   pantoprazole (PROTONIX) 40 MG tablet Take 1 tablet by mouth every morning (before breakfast) 5/27/25  Yes Ramirez Lindsay APRN - CNP   budesonide-formoterol (SYMBICORT) 80-4.5 MCG/ACT AERO Inhale 2 puffs into the lungs 2 times daily 5/27/25  Ramirez Feliciano APRN - CNP   lisinopril (PRINIVIL;ZESTRIL) 2.5 MG tablet Take 1 tablet by mouth daily 5/27/25  Ramirez Feliciano APRN - CNP   metFORMIN (GLUCOPHAGE) 1000 MG tablet TAKE 1 TABLET BY MOUTH TWICE A DAY WITH MEALS 5/27/25  Yes Ramirez Lindsay APRN - CNP   Insulin Degludec 200 UNIT/ML SOPN Inject 44 Units into the skin daily 5/27/25  Ramirez Feliciano APRN - CNP   glimepiride (AMARYL) 4 MG tablet TAKE 1 TABLET BY MOUTH EVERY DAY BEFORE BREAKFAST 9/15/24  Ramirez Feliciano APRN - CNP   albuterol sulfate HFA (PROVENTIL;VENTOLIN;PROAIR) 108 (90 Base) MCG/ACT inhaler Inhale 2 puffs into the lungs 4 times daily as needed for Wheezing or Shortness of Breath 6/6/24  Ramirez Feliciano APRN - CNP   NOVOLOG FLEXPEN 100 UNIT/ML injection pen INJECT UNDER THE SKIN BEFORE MEALS AND AT BEDTIME UP TO MAX DOSE  UNITS PER DAY 12/20/23  Yes Dagani, Heather D, APRN - CNP   BD PEN NEEDLE FILI 2ND GEN 32G X 4 MM MISC  2/17/24  Yes Heather Elizabeth, APRN - CNP   Continuous Blood Gluc Sensor (FREESTYLE KEVIN 3 SENSOR) MISC USE AS DIRECTED EVERY 14 DAYS 6/19/23  Yes Ramirez Lindsay, APRN - CNP   Omega-3 Fatty Acids (FISH OIL PO) Take by

## 2025-09-04 ENCOUNTER — E-VISIT (OUTPATIENT)
Dept: PRIMARY CARE CLINIC | Age: 50
End: 2025-09-04
Payer: COMMERCIAL

## 2025-09-04 DIAGNOSIS — B37.31 VAGINAL YEAST INFECTION: Primary | ICD-10-CM

## 2025-09-04 PROCEDURE — 99421 OL DIG E/M SVC 5-10 MIN: CPT | Performed by: NURSE PRACTITIONER

## 2025-09-05 RX ORDER — FLUCONAZOLE 150 MG/1
150 TABLET ORAL ONCE
Qty: 1 TABLET | Refills: 0 | Status: SHIPPED | OUTPATIENT
Start: 2025-09-05 | End: 2025-09-05

## (undated) DEVICE — AIRLIFE™ NASAL OXYGEN CANNULA CURVED, FLARED TIP, WITH 7 FEET (2.1 M) CRUSH RESISTANT TUBING, OVER-THE-EAR STYLE: Brand: AIRLIFE™

## (undated) DEVICE — KNIFE OPHTH DIA22MM 45DEG SLT W HNDL SHRP ANG PNT DEL DBL

## (undated) DEVICE — GLOVE SURG SZ 7 L12IN FNGR THK87MIL WHT LTX FREE

## (undated) DEVICE — MARKER,SKIN,WI/RULER AND LABELS: Brand: MEDLINE

## (undated) DEVICE — SOLUTION IV IRRIG WATER 1000ML POUR BRL 2F7114

## (undated) DEVICE — DRESSING TRNSPAR FLM 2.4X2.8IN SURESITE 123

## (undated) DEVICE — SOFT SHIELD® COLLAGEN SHIELD, 12 HOURS (CE): Brand: SOFT SHIELD® COLLAGEN SHIELDS

## (undated) DEVICE — Device: Brand: ALLEGRO 1X SILICONE I/A HANDPIECE (6)

## (undated) DEVICE — GLOVE SURG SZ 65 L12IN FNGR THK87MIL WHT LTX FREE

## (undated) DEVICE — AMVISC PLUS  0.8ML: Brand: AMVISC PLUS

## (undated) DEVICE — SOLUTION IV IRRIG POUR BRL 0.9% SODIUM CHL 2F7124

## (undated) DEVICE — Device

## (undated) DEVICE — BLADE 30D THK3.4MM 10.5X1.9MM ANG STAB

## (undated) DEVICE — BETADINE 5% EYE SOL